# Patient Record
Sex: FEMALE | Race: WHITE | NOT HISPANIC OR LATINO | Employment: FULL TIME | URBAN - METROPOLITAN AREA
[De-identification: names, ages, dates, MRNs, and addresses within clinical notes are randomized per-mention and may not be internally consistent; named-entity substitution may affect disease eponyms.]

---

## 2017-01-17 PROBLEM — R87.810 CERVICAL HIGH RISK HPV (HUMAN PAPILLOMAVIRUS) TEST POSITIVE: Status: ACTIVE | Noted: 2017-01-17

## 2017-01-17 PROBLEM — N92.0 MENORRHAGIA WITH REGULAR CYCLE: Status: RESOLVED | Noted: 2017-01-17 | Resolved: 2017-01-17

## 2017-01-17 PROBLEM — N92.0 MENORRHAGIA WITH REGULAR CYCLE: Status: ACTIVE | Noted: 2017-01-17

## 2018-01-23 DIAGNOSIS — R20.0 LEFT ARM NUMBNESS: Primary | ICD-10-CM

## 2018-03-09 PROBLEM — R06.00 DYSPNEA: Status: ACTIVE | Noted: 2018-03-09

## 2018-06-19 ENCOUNTER — TELEPHONE (OUTPATIENT)
Dept: NEUROSURGERY | Facility: CLINIC | Age: 43
End: 2018-06-19

## 2018-06-19 NOTE — TELEPHONE ENCOUNTER
----- Message from Oralia Armendariz PA-C sent at 6/19/2018  3:24 PM CDT -----  I ordered an MRI for Loren for lumbar back pain. Can you please schedule her at [facility]? I will discuss the results with her and have a formal follow up if needed

## 2018-07-02 DIAGNOSIS — M54.16 LUMBAR RADICULOPATHY: Primary | ICD-10-CM

## 2018-07-05 ENCOUNTER — TELEPHONE (OUTPATIENT)
Dept: NEUROSURGERY | Facility: CLINIC | Age: 43
End: 2018-07-05

## 2018-07-05 NOTE — TELEPHONE ENCOUNTER
----- Message from Oralia Armendariz PA-C sent at 6/29/2018 11:01 AM CDT -----  Regarding: FW: follow-up MRI  Can you please schedule her in 4 months for MRI lumber with and without     ----- Message -----  From: Cleve Ruiz MD  Sent: 6/29/2018   8:35 AM  To: Oralia Armendariz PA-C  Subject: follow-up MRI                                    Let's repeat an MRI with contrast in 4 months time. The lesion is somewhat too small for an accurate biopsy but it is atypical.    She can have PT etc meanwhile. Ask if she has any personal cancer history. I assume not.

## 2018-07-16 ENCOUNTER — OFFICE VISIT (OUTPATIENT)
Dept: CARDIOLOGY | Facility: CLINIC | Age: 43
End: 2018-07-16
Payer: COMMERCIAL

## 2018-07-16 VITALS
DIASTOLIC BLOOD PRESSURE: 77 MMHG | SYSTOLIC BLOOD PRESSURE: 138 MMHG | HEIGHT: 68 IN | WEIGHT: 203.69 LBS | HEART RATE: 92 BPM | BODY MASS INDEX: 30.87 KG/M2

## 2018-07-16 DIAGNOSIS — R00.2 PALPITATIONS: Primary | ICD-10-CM

## 2018-07-16 DIAGNOSIS — F41.1 ANXIETY, GENERALIZED: ICD-10-CM

## 2018-07-16 PROCEDURE — 99999 PR PBB SHADOW E&M-EST. PATIENT-LVL III: CPT | Mod: PBBFAC,,, | Performed by: INTERNAL MEDICINE

## 2018-07-16 PROCEDURE — 99204 OFFICE O/P NEW MOD 45 MIN: CPT | Mod: S$GLB,,, | Performed by: INTERNAL MEDICINE

## 2018-07-16 PROCEDURE — 93000 ELECTROCARDIOGRAM COMPLETE: CPT | Mod: S$GLB,,, | Performed by: INTERNAL MEDICINE

## 2018-07-16 NOTE — PROGRESS NOTES
Subjective:    Patient ID:  Loren Lemus is a 42 y.o. female who presents for evaluation of Consult (Ref by Dr. Pickard; irregular heart beats - chest heaviness ) and Tachycardia      HPI 41 yo female with PVC's, anxiety, migraine headaches.  Reports history of Mitral valve prolapse.  She is an OR nurse.  3/18, she was rounding on the floors.  Noted acute onset of chest pressure (elephant sitting on my chest), pleuritic shortness of breath associated with diaphoresis.  Has had prior panic attacks.  This felt different.  Went to ED, work-up was negative.  Was told on her ECG that her voltage would vary periodically (multiple ECG's performed).    In the past, when hooked up to telemetry for other reasons (ie kidney stones), she has been observed to have PVC's.   Notes occasional skipped beating, couple of times a month, for many years.  Also has had labile HR's, up to 130's.    Has had one episode of recurrent chest pressure 2 weeks ago with walking around the OR.  Indicates she was not under stress at the time.  Resolved after 5 minutes spontaneously.    Has had syncope in the remote past when going from sitting to standing.  Echo 3/10/18 EF 55-60, trivial MR, EDELMIRA 18.51  Spect stress 3/10/18 negative.  ECG reveals nsr with normal baseline intervals without ectopy.  Does not exercise.  She does not exercise because of her HR elevating.  Poor sleep hygiene.    Review of Systems   Constitution: Negative. Negative for weakness and malaise/fatigue.   Cardiovascular: Positive for palpitations. Negative for chest pain, dyspnea on exertion, irregular heartbeat, leg swelling, near-syncope, orthopnea, paroxysmal nocturnal dyspnea and syncope.   Respiratory: Negative for cough and shortness of breath.    Neurological: Negative for dizziness and light-headedness.   All other systems reviewed and are negative.       Objective:    Physical Exam   Constitutional: She is oriented to person, place, and time. She appears  well-developed and well-nourished.   Eyes: Conjunctivae are normal. No scleral icterus.   Neck: No JVD present. No tracheal deviation present.   Cardiovascular: Normal rate and regular rhythm.  PMI is not displaced.    Pulmonary/Chest: Effort normal and breath sounds normal. No respiratory distress.   Abdominal: Soft. There is no hepatosplenomegaly. There is no tenderness.   Musculoskeletal: She exhibits no edema or tenderness.   Neurological: She is alert and oriented to person, place, and time.   Skin: Skin is warm and dry. No rash noted.   Psychiatric: She has a normal mood and affect. Her behavior is normal.         Assessment:       1. Palpitations    2. Anxiety, generalized         Plan:           PVC's are not particularly troublesome for her.  We discussed that criteria for MVP have changed, and that by her more recent echo she has no evidence of this.  She has inappropriate sinus tachycardia.  Discussed lifestyle modification for this (exercise, improving sleep hygiene, and increasing hydration).  Can f/u PRN with EP.

## 2018-07-19 DIAGNOSIS — R00.2 PALPITATIONS: Primary | ICD-10-CM

## 2018-07-20 DIAGNOSIS — R00.2 PALPITATIONS: Primary | ICD-10-CM

## 2019-09-25 ENCOUNTER — OFFICE VISIT (OUTPATIENT)
Dept: NEUROLOGY | Facility: CLINIC | Age: 44
End: 2019-09-25
Payer: COMMERCIAL

## 2019-09-25 VITALS
WEIGHT: 200.94 LBS | RESPIRATION RATE: 17 BRPM | SYSTOLIC BLOOD PRESSURE: 138 MMHG | BODY MASS INDEX: 30.45 KG/M2 | DIASTOLIC BLOOD PRESSURE: 88 MMHG | HEIGHT: 68 IN | HEART RATE: 88 BPM

## 2019-09-25 DIAGNOSIS — G43.711 INTRACTABLE CHRONIC MIGRAINE WITHOUT AURA AND WITH STATUS MIGRAINOSUS: Primary | ICD-10-CM

## 2019-09-25 DIAGNOSIS — G43.109 MIGRAINE WITH AURA AND WITHOUT STATUS MIGRAINOSUS, NOT INTRACTABLE: ICD-10-CM

## 2019-09-25 PROCEDURE — 99999 PR PBB SHADOW E&M-EST. PATIENT-LVL III: CPT | Mod: PBBFAC,,, | Performed by: PSYCHIATRY & NEUROLOGY

## 2019-09-25 PROCEDURE — 99999 PR PBB SHADOW E&M-EST. PATIENT-LVL III: ICD-10-PCS | Mod: PBBFAC,,, | Performed by: PSYCHIATRY & NEUROLOGY

## 2019-09-25 PROCEDURE — 99204 PR OFFICE/OUTPT VISIT, NEW, LEVL IV, 45-59 MIN: ICD-10-PCS | Mod: S$GLB,,, | Performed by: PSYCHIATRY & NEUROLOGY

## 2019-09-25 PROCEDURE — 99204 OFFICE O/P NEW MOD 45 MIN: CPT | Mod: S$GLB,,, | Performed by: PSYCHIATRY & NEUROLOGY

## 2019-09-25 RX ORDER — NAPROXEN 500 MG/1
TABLET ORAL
COMMUNITY
End: 2020-06-01 | Stop reason: ALTCHOICE

## 2019-09-25 RX ORDER — TOPIRAMATE 50 MG/1
50 TABLET, FILM COATED ORAL 2 TIMES DAILY
Refills: 5 | COMMUNITY
Start: 2019-09-17 | End: 2021-11-10

## 2019-09-25 RX ORDER — ONDANSETRON 4 MG/1
4 TABLET, FILM COATED ORAL EVERY 6 HOURS PRN
Qty: 30 TABLET | Refills: 11 | Status: SHIPPED | OUTPATIENT
Start: 2019-09-25 | End: 2021-03-22 | Stop reason: SDUPTHER

## 2019-09-25 RX ORDER — DULOXETIN HYDROCHLORIDE 30 MG/1
30 CAPSULE, DELAYED RELEASE ORAL 2 TIMES DAILY
Refills: 5 | COMMUNITY
Start: 2019-09-17 | End: 2021-06-02

## 2019-09-25 RX ORDER — ZOLMITRIPTAN 5 MG/1
SPRAY, METERED NASAL
Qty: 6 EACH | Refills: 11 | Status: SHIPPED | OUTPATIENT
Start: 2019-09-25 | End: 2020-09-25 | Stop reason: SDUPTHER

## 2019-09-25 RX ORDER — NAPROXEN SODIUM 550 MG/1
550 TABLET ORAL 2 TIMES DAILY
Refills: 2 | COMMUNITY
Start: 2019-07-18 | End: 2020-06-01 | Stop reason: ALTCHOICE
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
## 2019-09-25 NOTE — PATIENT INSTRUCTIONS
TESTING:  -- none     REFERRALS:  -- none     PREVENTION (use daily regardless of headache):  -- seek authorization to begin Botox   -- start magnesium in ONE of the following preparations -    1. Magnesium oxide 800mg daily (the most common over the counter kind, may causes loose stools)   2. Magnesium citrate 400-500mg daily (harder to find, but more neutral on the bowels)   3. Magnesium glycinate 400mg daily (hardest to find, look online, but most bowel-neutral, best absorbed)     AS-NEEDED TREATMENT (use total no more than 10 days per month unless otherwise stated):  -- can try Zomig nasal spray - Start Zomig (zolmitriptan) at onset of migraine. May repeat once in 2 hours. No more than 2 doses per day, 10 days per month  -- otherwise continue naproxen for now  -- refill zofran

## 2019-10-03 ENCOUNTER — TELEPHONE (OUTPATIENT)
Dept: NEUROLOGY | Facility: CLINIC | Age: 44
End: 2019-10-03

## 2019-10-04 ENCOUNTER — PROCEDURE VISIT (OUTPATIENT)
Dept: NEUROLOGY | Facility: CLINIC | Age: 44
End: 2019-10-04
Payer: COMMERCIAL

## 2019-10-04 VITALS
WEIGHT: 202.19 LBS | HEIGHT: 68 IN | BODY MASS INDEX: 30.64 KG/M2 | SYSTOLIC BLOOD PRESSURE: 127 MMHG | DIASTOLIC BLOOD PRESSURE: 82 MMHG | RESPIRATION RATE: 17 BRPM | HEART RATE: 89 BPM

## 2019-10-04 DIAGNOSIS — G43.711 INTRACTABLE CHRONIC MIGRAINE WITHOUT AURA AND WITH STATUS MIGRAINOSUS: Primary | ICD-10-CM

## 2019-10-04 PROCEDURE — 64615 PR CHEMODENERVATION OF MUSCLE FOR CHRONIC MIGRAINE: ICD-10-PCS | Mod: S$GLB,,, | Performed by: PSYCHIATRY & NEUROLOGY

## 2019-10-04 PROCEDURE — 96372 PR INJECTION,THERAP/PROPH/DIAG2ST, IM OR SUBCUT: ICD-10-PCS | Mod: 59,S$GLB,, | Performed by: PSYCHIATRY & NEUROLOGY

## 2019-10-04 PROCEDURE — 96372 THER/PROPH/DIAG INJ SC/IM: CPT | Mod: 59,S$GLB,, | Performed by: PSYCHIATRY & NEUROLOGY

## 2019-10-04 PROCEDURE — 64615 CHEMODENERV MUSC MIGRAINE: CPT | Mod: S$GLB,,, | Performed by: PSYCHIATRY & NEUROLOGY

## 2019-10-04 RX ORDER — KETOROLAC TROMETHAMINE 30 MG/ML
60 INJECTION, SOLUTION INTRAMUSCULAR; INTRAVENOUS
Status: COMPLETED | OUTPATIENT
Start: 2019-10-04 | End: 2019-10-04

## 2019-10-04 RX ADMIN — KETOROLAC TROMETHAMINE 60 MG: 30 INJECTION, SOLUTION INTRAMUSCULAR; INTRAVENOUS at 01:10

## 2019-10-04 NOTE — PROCEDURES
Procedures     PROCEDURE PERFORMED: Botulinum toxin injection (14992)    CLINICAL INDICATION: G43.719    A time out was conducted just before the start of the procedure to verify the correct patient and procedure, procedure location, and all relevant critical information.     Conventional methods of treatment such as multiple medications, both on and   off label have been tried including: anti-epileptics (topiramate, gabapentin) and antidepressants (duloxetine    The patient has been unresponsive and refractory.The patient meets criteria for chronic headaches according to the ICHD-II, the patient has more than 15 headaches a month which last for more than 4 hours a day.    Botox session number: 1  Last session was 12 weeks ago and resulted in improvement of: NA    I am aiming for at least 50%  improvement in the patient's symptoms. Frequency of treatment is every 3 months unless no response to the treatments, at which time we will discontinue the injections.     DESCRIPTION OF PROCEDURE: After obtaining informed consent and under   aseptic technique, a total of 135 units of botulinum toxin type A were   injected in the following muscles:     -- Procerus 5 units  --  5 units bilaterally  -- Temporalis 20 units bilaterally  -- Occipitalis 15 units bilaterally  -- Upper cervical paraspinals 10 units bilaterally  -- Trapezius 15 units bilaterally.     Spared due to anatomy -   -- Frontalis 20 units    The patient tolerated the procedure well. There were no complications. The patient was given a prescription for repeat treatment in 12 weeks     Unavoidable waste 65 units    Toshia Garcia MD

## 2019-10-07 ENCOUNTER — TELEPHONE (OUTPATIENT)
Dept: NEUROLOGY | Facility: CLINIC | Age: 44
End: 2019-10-07

## 2019-10-07 NOTE — TELEPHONE ENCOUNTER
Received additional questions in regards from [employer]. Questions completed and faxed back to [phone].

## 2019-12-18 ENCOUNTER — TELEPHONE (OUTPATIENT)
Dept: NEUROLOGY | Facility: CLINIC | Age: 44
End: 2019-12-18

## 2019-12-18 DIAGNOSIS — G43.711 INTRACTABLE CHRONIC MIGRAINE WITHOUT AURA AND WITH STATUS MIGRAINOSUS: Primary | ICD-10-CM

## 2019-12-20 ENCOUNTER — PROCEDURE VISIT (OUTPATIENT)
Dept: NEUROLOGY | Facility: CLINIC | Age: 44
End: 2019-12-20
Payer: COMMERCIAL

## 2019-12-20 VITALS
HEIGHT: 69 IN | SYSTOLIC BLOOD PRESSURE: 126 MMHG | RESPIRATION RATE: 18 BRPM | DIASTOLIC BLOOD PRESSURE: 77 MMHG | BODY MASS INDEX: 31.19 KG/M2 | WEIGHT: 210.56 LBS | HEART RATE: 78 BPM

## 2019-12-20 DIAGNOSIS — G43.711 INTRACTABLE CHRONIC MIGRAINE WITHOUT AURA AND WITH STATUS MIGRAINOSUS: Primary | ICD-10-CM

## 2019-12-20 PROCEDURE — 64615 CHEMODENERV MUSC MIGRAINE: CPT | Mod: S$GLB,,, | Performed by: PSYCHIATRY & NEUROLOGY

## 2019-12-20 PROCEDURE — 64615 PR CHEMODENERVATION OF MUSCLE FOR CHRONIC MIGRAINE: ICD-10-PCS | Mod: S$GLB,,, | Performed by: PSYCHIATRY & NEUROLOGY

## 2019-12-20 NOTE — PROCEDURES
Procedures       PROCEDURE PERFORMED: Botulinum toxin injection (92538)    CLINICAL INDICATION: G43.719    A time out was conducted just before the start of the procedure to verify the correct patient and procedure, procedure location, and all relevant critical information.     Conventional methods of treatment such as multiple medications, both on and   off label have been tried including: anti-epileptics (topiramate, gabapentin) and antidepressants (duloxetine    The patient has been unresponsive and refractory.The patient meets criteria for chronic headaches according to the ICHD-II, the patient has more than 15 headaches a month which last for more than 4 hours a day.    Botox session number: 2  Last session was 12 weeks ago and resulted in improvement of: >50%     I am aiming for at least 50%  improvement in the patient's symptoms. Frequency of treatment is every 3 months unless no response to the treatments, at which time we will discontinue the injections.     DESCRIPTION OF PROCEDURE: After obtaining informed consent and under   aseptic technique, a total of 135 units of botulinum toxin type A were   injected in the following muscles:     -- Procerus 5 units  --  5 units bilaterally  -- Temporalis 20 units bilaterally  -- Occipitalis 15 units bilaterally  -- Upper cervical paraspinals 10 units bilaterally  -- Trapezius 15 units bilaterally.     Spared due to anatomy -   -- Frontalis 20 units    The patient tolerated the procedure well. There were no complications. The patient was given a prescription for repeat treatment in 12 weeks     Unavoidable waste 65 units    Toshia Garcia MD

## 2020-01-29 ENCOUNTER — TELEPHONE (OUTPATIENT)
Dept: PHARMACY | Facility: CLINIC | Age: 45
End: 2020-01-29

## 2020-01-29 NOTE — TELEPHONE ENCOUNTER
Good Afternoon,     The prior authorization for Loren Lemus's Zomig Nasal prescription has been APPROVED FROM 1/29/2020 TO 1/28/2021 with copayment of $25.00.       We were unable to reach patient on 1/29/2020.  A voicemail was left for the patient of the prior authorization status along with an appropriate pharmacy phone number should he/she have questions.    If there are any additional questions or concerns, please contact me.    Thank You!   Loren Lechuga CPhT, B.A  Patient Care Advocate   [facility]  Phone: [phone] Ext [phone]  Fax: [phone]

## 2020-03-16 ENCOUNTER — TELEPHONE (OUTPATIENT)
Dept: NEUROLOGY | Facility: CLINIC | Age: 45
End: 2020-03-16

## 2020-03-16 DIAGNOSIS — G43.711 INTRACTABLE CHRONIC MIGRAINE WITHOUT AURA AND WITH STATUS MIGRAINOSUS: Primary | ICD-10-CM

## 2020-03-20 ENCOUNTER — PATIENT MESSAGE (OUTPATIENT)
Dept: NEUROLOGY | Facility: CLINIC | Age: 45
End: 2020-03-20

## 2020-03-20 ENCOUNTER — TELEPHONE (OUTPATIENT)
Dept: NEUROLOGY | Facility: CLINIC | Age: 45
End: 2020-03-20

## 2020-03-20 NOTE — TELEPHONE ENCOUNTER
Left voicemail in regards to botox denied by insurance. Number to Financial call team given to help with payments. Botox savings web site given. Informed appt will need to be cancelled and to call back.

## 2020-03-23 ENCOUNTER — TELEPHONE (OUTPATIENT)
Dept: NEUROLOGY | Facility: CLINIC | Age: 45
End: 2020-03-23

## 2020-03-23 DIAGNOSIS — G43.711 INTRACTABLE CHRONIC MIGRAINE WITHOUT AURA AND WITH STATUS MIGRAINOSUS: Primary | ICD-10-CM

## 2020-03-24 ENCOUNTER — TELEPHONE (OUTPATIENT)
Dept: NEUROLOGY | Facility: CLINIC | Age: 45
End: 2020-03-24

## 2020-03-25 ENCOUNTER — TELEPHONE (OUTPATIENT)
Dept: UROGYNECOLOGY | Facility: CLINIC | Age: 45
End: 2020-03-25

## 2020-03-25 NOTE — TELEPHONE ENCOUNTER
----- Message from Jessica Wisdom sent at 3/25/2020  1:29 PM CDT -----  Contact: my chart request  Appointment Request From: Loren Lemus    With Provider: Kayla    Preferred Date Range: Any date 3/25/2020 or later    Preferred Times: Any time    Reason for visit: Vaginally prolapse    Comments:  Arash

## 2020-03-26 ENCOUNTER — PROCEDURE VISIT (OUTPATIENT)
Dept: NEUROLOGY | Facility: CLINIC | Age: 45
End: 2020-03-26
Payer: COMMERCIAL

## 2020-03-26 VITALS
HEIGHT: 69 IN | BODY MASS INDEX: 31.1 KG/M2 | RESPIRATION RATE: 16 BRPM | TEMPERATURE: 98 F | DIASTOLIC BLOOD PRESSURE: 79 MMHG | WEIGHT: 210 LBS | HEART RATE: 101 BPM | SYSTOLIC BLOOD PRESSURE: 120 MMHG

## 2020-03-26 DIAGNOSIS — G43.711 INTRACTABLE CHRONIC MIGRAINE WITHOUT AURA AND WITH STATUS MIGRAINOSUS: Primary | ICD-10-CM

## 2020-03-26 PROCEDURE — 96372 PR INJECTION,THERAP/PROPH/DIAG2ST, IM OR SUBCUT: ICD-10-PCS | Mod: S$GLB,,, | Performed by: PSYCHIATRY & NEUROLOGY

## 2020-03-26 PROCEDURE — 96372 THER/PROPH/DIAG INJ SC/IM: CPT | Mod: S$GLB,,, | Performed by: PSYCHIATRY & NEUROLOGY

## 2020-03-26 PROCEDURE — 64615 CHEMODENERV MUSC MIGRAINE: CPT | Mod: S$GLB,,, | Performed by: PSYCHIATRY & NEUROLOGY

## 2020-03-26 PROCEDURE — 64615 PR CHEMODENERVATION OF MUSCLE FOR CHRONIC MIGRAINE: ICD-10-PCS | Mod: S$GLB,,, | Performed by: PSYCHIATRY & NEUROLOGY

## 2020-03-26 RX ORDER — KETOROLAC TROMETHAMINE 30 MG/ML
60 INJECTION, SOLUTION INTRAMUSCULAR; INTRAVENOUS
Status: COMPLETED | OUTPATIENT
Start: 2020-03-26 | End: 2020-03-26

## 2020-03-26 RX ADMIN — KETOROLAC TROMETHAMINE 60 MG: 30 INJECTION, SOLUTION INTRAMUSCULAR; INTRAVENOUS at 09:03

## 2020-03-26 NOTE — PROCEDURES
Procedures       PROCEDURE PERFORMED: Botulinum toxin injection (99808)    CLINICAL INDICATION: G43.719    A time out was conducted just before the start of the procedure to verify the correct patient and procedure, procedure location, and all relevant critical information.     Conventional methods of treatment such as multiple medications, both on and   off label have been tried including: anti-epileptics (topiramate, gabapentin) and antidepressants (duloxetine    The patient has been unresponsive and refractory.The patient meets criteria for chronic headaches according to the ICHD-II, the patient has more than 15 headaches a month which last for more than 4 hours a day.    Botox session number: 3  Last session was 12 weeks ago and resulted in improvement of: >50%     I am aiming for at least 50%  improvement in the patient's symptoms. Frequency of treatment is every 3 months unless no response to the treatments, at which time we will discontinue the injections.     DESCRIPTION OF PROCEDURE: After obtaining informed consent and under   aseptic technique, a total of 135 units of botulinum toxin type A were   injected in the following muscles:     -- Procerus 5 units  --  5 units bilaterally  -- Temporalis 20 units bilaterally  -- Occipitalis 15 units bilaterally  -- Upper cervical paraspinals 10 units bilaterally  -- Trapezius 15 units bilaterally.     Spared due to anatomy -   -- Frontalis 20 units    The patient tolerated the procedure well. There were no complications. The patient was given a prescription for repeat treatment in 12 weeks     Unavoidable waste 65 units    Toshia Garcia MD

## 2020-03-26 NOTE — NURSING
Patient seen in clinic by Dr Garcia.Order given to administer Toradol 60 mg IM.2 Patient identifiers and allergies reviewed with Patient.Injection given in left outer gluteus.Site clean,no bleeding or redness noted.Sterile band aid applied to site.Before injection Patient rated pain at a 3.Patient waited in exam room with Nursing present for 15 minutes.No untoward reaction noted.Pain scale upon departure is a 2.

## 2020-04-21 DIAGNOSIS — Z01.84 ANTIBODY RESPONSE EXAMINATION: ICD-10-CM

## 2020-04-24 ENCOUNTER — LAB VISIT (OUTPATIENT)
Dept: LAB | Facility: HOSPITAL | Age: 45
End: 2020-04-24
Attending: INTERNAL MEDICINE
Payer: COMMERCIAL

## 2020-04-24 DIAGNOSIS — Z01.84 ANTIBODY RESPONSE EXAMINATION: ICD-10-CM

## 2020-04-24 LAB — SARS-COV-2 IGG SERPL QL IA: NEGATIVE

## 2020-04-24 PROCEDURE — 86769 SARS-COV-2 COVID-19 ANTIBODY: CPT

## 2020-04-24 PROCEDURE — 36415 COLL VENOUS BLD VENIPUNCTURE: CPT

## 2020-04-29 ENCOUNTER — TELEPHONE (OUTPATIENT)
Dept: RESEARCH | Facility: CLINIC | Age: 45
End: 2020-04-29

## 2020-05-01 ENCOUNTER — RESEARCH ENCOUNTER (OUTPATIENT)
Dept: RESEARCH | Facility: CLINIC | Age: 45
End: 2020-05-01
Payer: COMMERCIAL

## 2020-05-01 DIAGNOSIS — Z00.00 PREVENTATIVE HEALTH CARE: Primary | ICD-10-CM

## 2020-05-01 DIAGNOSIS — Z00.6 RESEARCH STUDY PATIENT: ICD-10-CM

## 2020-05-01 PROCEDURE — 99499 UNLISTED E&M SERVICE: CPT | Mod: S$GLB,,, | Performed by: CLINICAL NURSE SPECIALIST

## 2020-05-01 PROCEDURE — 99499 NO LOS: ICD-10-PCS | Mod: S$GLB,,, | Performed by: CLINICAL NURSE SPECIALIST

## 2020-05-01 NOTE — PROGRESS NOTES
Sponsor: [facility]   Study: Healthcare Worker Exposure Response and Outcomes of Hydroxychloroquine Trial (HERO-HCQ Trial)  IRB Number: [id number]  Principle Investigator: Gina Kimball MSN, APRN, [facility]    Site Number: [id number]  Subject Number: [id number]      Baseline Visit (Onsite, Day 0)  Screening of inclusion/exclusion criteria evaluation for this study were reviewed by [employer]. Loren Lemus has executed the informed consent and is eligible to participate in this study. The patient meets all inclusion and does not meet any one of the exclusion criteria, is afebrile and drug allergy reviewed.    The screening procedures completed include the following:   Demographic data (including gender, age, ethnicity, date of birth, and occupation);   Temperature:97.7   Medical history and prohibited medications;   Study questionnaires      The patient is now randomized to treatment:  Date Dispensed: 01MAY2020  Bottle Number: [id number]  Dose (mg) of each table: 200 mg  Tablets in each bottle: 100  Lot Number: [id number]  Expiration Date: 31JAN2022    Patient instructed to take study drug as directed.  600mg po BID on Day #1 then  400mg po QD x29 days     The following biospecimen collection procedures were collected:   Nasopharyngeal Swab Collection   Blood collection     The patient reports no adverse events and events of special interest.

## 2020-05-01 NOTE — PROGRESS NOTES
Sponsor: [facility]   Study: Healthcare Worker Exposure Response and Outcomes of Hydroxychloroquine Trial (HERO-HCQ Trial)  IRB Number: 2020.131  Principle Investigator: Gina Kimball, MSN, APRN, ACNS-BC  Present for Discussion: Loren Lemus (patient), Maureen Knox (CRC)   Site Number: [id number]  Subject Number: [id number]    Prior to the Informed Consent (IC) being signed, or any study protocol required data collection, testing, procedure, or intervention being performed, the following was done and/or discussed:   Patient was given a copy of the IC for review    Purpose of the study and qualifications to participate    Study design, follow up schedule, and tests or procedures done at each visit   Confidentiality and HIPAA Authorization for Release of Medical Records for the research trial/ subject's rights/research related injury   Risk, Benefits, Alternative Treatments, Compensation and Costs   Participation in the research trial is voluntary and patient may withdraw at anytime   Contact information for study related questions    Patient verbalizes understanding of the above: Yes  Contact information for CRC and PI given to patient: Yes  Patient able to adequately summarize: the purpose of the study, the risks associated with the study, and all procedures, testing, and follow-ups associated with the study: Yes    Loren Lemus signed the informed consent form for the HERO-HCQ research study with an IRB approval date of 04/20/2020.  Each page of the consent form was reviewed with her and all questions answered satisfactorily. She then signed the paper consent form, which was countersigned by the CRC on this day. A copy of the fully executed IC was provided to the subject. The original consent was scanned and uploaded to the [facility] EMR (maufait) and filed into the subject's research study binder.     Following the execution of the informed consent, the patient was identified as subject [id number], was  added to [employer], and the study sponsor's EDC system. The visit was linked to the study accordingly.

## 2020-06-01 ENCOUNTER — OFFICE VISIT (OUTPATIENT)
Dept: ORTHOPEDICS | Facility: CLINIC | Age: 45
End: 2020-06-01
Payer: COMMERCIAL

## 2020-06-01 ENCOUNTER — HOSPITAL ENCOUNTER (OUTPATIENT)
Dept: RADIOLOGY | Facility: HOSPITAL | Age: 45
Discharge: HOME | End: 2020-06-01
Attending: ORTHOPAEDIC SURGERY
Payer: COMMERCIAL

## 2020-06-01 ENCOUNTER — OFFICE VISIT (OUTPATIENT)
Dept: INFECTIOUS DISEASES | Facility: CLINIC | Age: 45
End: 2020-06-01
Payer: COMMERCIAL

## 2020-06-01 VITALS — BODY MASS INDEX: 31.1 KG/M2 | WEIGHT: 210 LBS | RESPIRATION RATE: 16 BRPM | HEIGHT: 69 IN

## 2020-06-01 DIAGNOSIS — M25.532 LEFT WRIST PAIN: ICD-10-CM

## 2020-06-01 DIAGNOSIS — Z00.6 RESEARCH STUDY PATIENT: ICD-10-CM

## 2020-06-01 DIAGNOSIS — Z00.00 PREVENTATIVE HEALTH CARE: Primary | ICD-10-CM

## 2020-06-01 DIAGNOSIS — S60.212A CONTUSION OF LEFT WRIST, INITIAL ENCOUNTER: Primary | ICD-10-CM

## 2020-06-01 DIAGNOSIS — M25.532 LEFT WRIST PAIN: Primary | ICD-10-CM

## 2020-06-01 PROCEDURE — 3008F PR BODY MASS INDEX (BMI) DOCUMENTED: ICD-10-PCS | Mod: CPTII,S$GLB,, | Performed by: ORTHOPAEDIC SURGERY

## 2020-06-01 PROCEDURE — 99499 UNLISTED E&M SERVICE: CPT | Mod: S$GLB,,, | Performed by: CLINICAL NURSE SPECIALIST

## 2020-06-01 PROCEDURE — 99204 OFFICE O/P NEW MOD 45 MIN: CPT | Mod: S$GLB,,, | Performed by: ORTHOPAEDIC SURGERY

## 2020-06-01 PROCEDURE — 73110 X-RAY EXAM OF WRIST: CPT | Mod: TC,PN,LT

## 2020-06-01 PROCEDURE — 99999 PR PBB SHADOW E&M-EST. PATIENT-LVL III: ICD-10-PCS | Mod: PBBFAC,,, | Performed by: ORTHOPAEDIC SURGERY

## 2020-06-01 PROCEDURE — 99499 NO LOS: ICD-10-PCS | Mod: S$GLB,,, | Performed by: CLINICAL NURSE SPECIALIST

## 2020-06-01 PROCEDURE — 99999 PR PBB SHADOW E&M-EST. PATIENT-LVL III: CPT | Mod: PBBFAC,,, | Performed by: ORTHOPAEDIC SURGERY

## 2020-06-01 PROCEDURE — 99204 PR OFFICE/OUTPT VISIT, NEW, LEVL IV, 45-59 MIN: ICD-10-PCS | Mod: S$GLB,,, | Performed by: ORTHOPAEDIC SURGERY

## 2020-06-01 PROCEDURE — 73110 XR WRIST COMPLETE 3 VIEWS LEFT: ICD-10-PCS | Mod: 26,LT,, | Performed by: RADIOLOGY

## 2020-06-01 PROCEDURE — 73110 X-RAY EXAM OF WRIST: CPT | Mod: 26,LT,, | Performed by: RADIOLOGY

## 2020-06-01 PROCEDURE — 3008F BODY MASS INDEX DOCD: CPT | Mod: CPTII,S$GLB,, | Performed by: ORTHOPAEDIC SURGERY

## 2020-06-01 PROCEDURE — 99999 PR PBB SHADOW E&M-EST. PATIENT-LVL III: CPT | Mod: PBBFAC,,, | Performed by: CLINICAL NURSE SPECIALIST

## 2020-06-01 PROCEDURE — 99999 PR PBB SHADOW E&M-EST. PATIENT-LVL III: ICD-10-PCS | Mod: PBBFAC,,, | Performed by: CLINICAL NURSE SPECIALIST

## 2020-06-01 RX ORDER — KETOROLAC TROMETHAMINE 10 MG/1
10 TABLET, FILM COATED ORAL EVERY 6 HOURS
Qty: 20 TABLET | Refills: 0 | Status: SHIPPED | OUTPATIENT
Start: 2020-06-01 | End: 2020-11-27 | Stop reason: SDUPTHER

## 2020-06-01 RX ORDER — METHYLPREDNISOLONE 4 MG/1
TABLET ORAL
Qty: 1 PACKAGE | Refills: 0 | Status: SHIPPED | OUTPATIENT
Start: 2020-06-01 | End: 2020-06-22

## 2020-06-01 NOTE — PROGRESS NOTES
Sponsor: [facility]   Study: Healthcare Worker Exposure Response and Outcomes of Hydroxychloroquine Trial (HERO-HCQ Trial)  IRB Number: 2020.131  Principle Investigator: Gina Kimball, MSN, APRN, ACNS-BC  Present for Discussion: subject and Gina Kimball  Site Number: [id number]  Subject Number: [id number]        Follow-up Day 30 (+ 5 days - Onsite)  Loren Lemus has executed the informed consent and is eligible to participate in this study. The participant completed 30 days of study drug and is present for Follow up Day 30 visit.     The following procedures were completed:   Concomitant medications of interest;   COVID-19 Questionnaire    QoL questionnaire     The following biospecimen collection procedures were collected:   Nasopharyngeal Swab Collection   Blood collection     The patient reports no reportable adverse events.

## 2020-06-01 NOTE — PATIENT INSTRUCTIONS
Ketorolac tablets  What is this medicine?  KETOROLAC (natasha toe ROLE ak) is a non-steroidal anti-inflammatory drug (NSAID). It is used for a short while to treat moderate to severe pain, including pain after surgery. It should not be used for more than 5 days.  How should I use this medicine?  Take this medicine by mouth with a full glass of water. Follow the directions on the prescription label. Take your medicine at regular intervals. Do not take your medicine more often than directed. Do not take more than the recommended dose.  A special MedGuide will be given to you by the pharmacist with each prescription and refill. Be sure to read this information carefully each time.  Talk to your pediatrician regarding the use of this medicine in children. While this drug may be prescribed for children as young as 16 years of age for selected conditions, precautions do apply.  Patients over 65 years old may have a stronger reaction and need a smaller dose.  What side effects may I notice from receiving this medicine?  Side effects that you should report to your doctor or health care professional as soon as possible:  · allergic reactions like skin rash, itching or hives, swelling of the face, lips, or tongue  · breathing problems  · high blood pressure  · nausea, vomiting  · redness, blistering, peeling or loosening of the skin, including inside the mouth  · severe stomach pain  · signs and symptoms of bleeding such as bloody or black, tarry stools; red or dark-brown urine; spitting up blood or brown material that looks like coffee grounds; red spots on the skin; unusual bruising or bleeding from the eye, gums, or nose  · signs and symptoms of a stroke like changes in vision; confusion; trouble speaking or understanding; severe headaches; sudden numbness or weakness of the face, arm or leg; trouble walking; dizziness; loss of balance or coordination  · trouble passing urine or change in the amount of urine  · unexplained  weight gain or swelling  · unusually weak or tired  · yellowing of eyes or skin  Side effects that usually do not require medical attention (report to your doctor or health care professional if they continue or are bothersome):  · diarrhea  · dizziness  · headache  · heartburn  What may interact with this medicine?  Do not take this medicine with any of the following medications:  · aspirin and aspirin-like medicines  · cidofovir  · methotrexate  · NSAIDs, medicines for pain and inflammation, like ibuprofen or naproxen  · pemetrexed  · probenecid  This medicine may also interact with the following medications:  · alcohol  · alendronate  · alprazolam  · carbamazepine  · cyclosporine  · diuretics  · flavocoxid  · fluoxetine  · ginkgo  · lithium  · medicines for high blood pressure like enalapril  · medicines that affect platelets like pentoxifylline  · medicines that treat or prevent blood clots like heparin, warfarin  · muscle relaxants  · phenytoin  · steroid medicines like prednisone or cortisone  · thiothixene  What if I miss a dose?  If you miss a dose, take it as soon as you can. If it is almost time for your next dose, take only that dose. Do not take double or extra doses.  Where should I keep my medicine?  Keep out of the reach of children.  Store at room temperature between 20 and 25 degrees C (68 and 77 degrees F). Throw away any unused medicine after the expiration date.  What should I tell my health care provider before I take this medicine?  They need to know if you have any of these conditions:  · asthma  · bleeding problems like hemophilia  · cigarette smoker  · drink more than 3 alcohol containing drinks a day  · heart disease or circulation problems such as heart failure or leg edema (fluid retention)  · high blood pressure  · kidney disease  · liver disease  · stomach bleeding or ulcers  · an unusual or allergic reaction to ketorolac, aspirin, other NSAIDs, other medicines, foods, dyes, or  preservatives  · pregnant or trying to get pregnant  · breast-feeding  What should I watch for while using this medicine?  Tell your doctor or health care professional if your pain does not get better. Talk to your doctor before taking another medicine for pain. Do not treat yourself.  This medicine does not prevent heart attack or stroke. In fact, this medicine may increase the chance of a heart attack or stroke. The chance may increase with longer use of this medicine and in people who have heart disease. If you take aspirin to prevent heart attack or stroke, talk with your doctor or health care professional.  Do not take medicines such as ibuprofen and naproxen with this medicine. Side effects such as stomach upset, nausea, or ulcers may be more likely to occur. Many medicines available without a prescription should not be taken with this medicine.  This medicine can cause ulcers and bleeding in the stomach and intestines at any time during treatment. Do not smoke cigarettes or drink alcohol. These increase irritation to your stomach and can make it more susceptible to damage from this medicine. Ulcers and bleeding can happen without warning symptoms and can cause death.  You may get drowsy or dizzy. Do not drive, use machinery, or do anything that needs mental alertness until you know how this medicine affects you. Do not stand or sit up quickly, especially if you are an older patient. This reduces the risk of dizzy or fainting spells.  This medicine can cause you to bleed more easily. Try to avoid damage to your teeth and gums when you brush or floss your teeth.  NOTE:This sheet is a summary. It may not cover all possible information. If you have questions about this medicine, talk to your doctor, pharmacist, or health care provider. Copyright© 2017 [employer]        Methylprednisolone tablets  What is this medicine?  METHYLPREDNISOLONE (meth ill pred NISS oh lone) is a corticosteroid. It is commonly used to  treat inflammation of the skin, joints, lungs, and other organs. Common conditions treated include asthma, allergies, and arthritis. It is also used for other conditions, such as blood disorders and diseases of the adrenal glands.  How should I use this medicine?  Take this medicine by mouth with a drink of water. Follow the directions on the prescription label. Take it with food or milk to avoid stomach upset. If you are taking this medicine once a day, take it in the morning. Do not take more medicine than you are told to take. Do not suddenly stop taking your medicine because you may develop a severe reaction. Your doctor will tell you how much medicine to take. If your doctor wants you to stop the medicine, the dose may be slowly lowered over time to avoid any side effects.  Talk to your pediatrician regarding the use of this medicine in children. Special care may be needed.  What side effects may I notice from receiving this medicine?  Side effects that you should report to your doctor or health care professional as soon as possible:  · allergic reactions like skin rash, itching or hives, swelling of the face, lips, or tongue  · eye pain, decreased or blurred vision, or bulging eyes  · fever, sore throat, sneezing, cough, or other signs of infection, wounds that will not heal  · increased thirst  · mental depression, mood swings, mistaken feelings of self importance or of being mistreated  · pain in hips, back, ribs, arms, shoulders, or legs  · swelling of the ankles, feet, hands  · trouble passing urine or change in the amount of urine  Side effects that usually do not require medical attention (report to your doctor or health care professional if they continue or are bothersome):  · confusion, excitement, restlessness  · headache  · nausea, vomiting  · skin problems, acne, thin and shiny skin  · weight gain  What may interact with this medicine?  Do not take this medicine with any of the following  medications:  · mifepristone  This medicine may also interact with the following medications:  · tacrolimus  · vaccines  · warfarin  What if I miss a dose?  If you miss a dose, take it as soon as you can. If it is almost time for your next dose, talk to your doctor or health care professional. You may need to miss a dose or take an extra dose. Do not take double or extra doses without advice.  Where should I keep my medicine?  Keep out of the reach of children.  Store at room temperature between 20 and 25 degrees C (68 and 77 degrees F). Throw away any unused medicine after the expiration date.  What should I tell my health care provider before I take this medicine?  They need to know if you have any of these conditions:  · Cushing's syndrome  · diabetes  · glaucoma  · heart problems or disease  · high blood pressure  · infection such as herpes, measles, tuberculosis, or chickenpox  · kidney disease  · liver disease  · mental problems  · myasthenia gravis  · osteoporosis  · seizures  · stomach ulcer or intestine disease including colitis and diverticulitis  · thyroid problem  · an unusual or allergic reaction to lactose, methylprednisolone, other medicines, foods, dyes, or preservatives  · pregnant or trying to get pregnant  · breast-feeding  What should I watch for while using this medicine?  Visit your doctor or health care professional for regular checks on your progress. If you are taking this medicine for a long time, carry an identification card with your name and address, the type and dose of your medicine, and your doctor's name and address.  The medicine may increase your risk of getting an infection. Stay away from people who are sick. Tell your doctor or health care professional if you are around anyone with measles or chickenpox.  If you are going to have surgery, tell your doctor or health care professional that you have taken this medicine within the last twelve months.  Ask your doctor or health care  professional about your diet. You may need to lower the amount of salt you eat.  The medicine can increase your blood sugar. If you are a diabetic check with your doctor if you need help adjusting the dose of your diabetic medicine.  NOTE:This sheet is a summary. It may not cover all possible information. If you have questions about this medicine, talk to your doctor, pharmacist, or health care provider. Copyright© 2017 [employer] Standard

## 2020-06-01 NOTE — PROGRESS NOTES
CC:  A 4-year-old female presents for evaluation of left wrist pain the patient is going to walker dog on 05/30/2020, 2 days ago.  She states when they got to the door dog bolted and ended up slamming her dorsal hand and wrist into the door frame.  She complains of pain in the left wrist since that time.  She rates the pain as a 5/10.  She is having difficulty gripping and grasping with the left hand since the injury.  The pain localizes over the dorsum of her left wrist.  She has been taking Naprosyn without complete relief.    ROS:    Constitution: Denies chills, fever, and sweats.  HENT: Denies headaches or blurry vision.  Cardiovascular: Denies chest pain or irregular heart beat.  Respiratory: Denies cough or shortness of breath.  Gastrointestinal: Denies abdominal pain, nausea, or vomiting.  Genitourinary:  Denies urinary incontinence, bladder and kidney issues  Musculoskeletal:  Denies muscle cramps.  Positive for left wrist pain  Neurological: Denies dizziness or focal weakness.  Psychiatric/Behavioral: Normal mental status.  Hematologic/Lymphatic: Denies bleeding problem or easy bruising/bleeding.  Skin: Denies rash or suspicious lesions.    Physical examination     Gen - No acute distress, well nourished, well groomed   Eyes - Extraoccular motions intact, pupils equally round and reactive to light and accommodation   ENT - normocephalic, atruamtic, oropharynx clear   Neck - Supple, no abnormal masses   Cardiovascular - regular rate and rhythm   Pulmonary - clear to auscultation bilaterally, no wheezes, ronchi, or rales   Abdomen - soft, non-tender, non-distended, positive bowel sounds   Psych - The patient is alert and oriented x3 with normal mood and affect    Left Upper Extremity Examination     Skin shows an abrasion on the dorsal hand over the ring finger metacarpal that is healing.  There is ecchymosis over the ring finger metacarpal base down into the area of the wrist dorsally.  Motor is intact  distally radial, median, ulnar, AIN, PIN   +2 radial and ulnar pulses   Sensation to light touch is intact distally radial, median, and ulnar   Full ROM at the DIP, PIP, and MCP joints   Wrist shows decreased ROM secondary to pain  Tenderness to palpation noted over the dorsal radiocarpal joint and DRUJ    Carpal Tunnel compression test - negative   Phalen's Test - negative  Tinel's Test - negative  Finkelstien's Test - negative    Ecchymosis noted   Swelling noted     Triggering of fingers or thumb - negative    X-ray images were examined and personally interpreted by me.  Three views left wrist dated 06/01/2020 show joint spaces are all well maintained.  There are no acute fractures.  No advanced arthritic changes.  Normal bony anatomy    Dx:  Contusion to the dorsal left wrist    Plan:  Recommendation is for immobilization in a splint along with a Medrol Dosepak and Toradol.  The patient is in agreement.  We dispensed the splint and she is going to  the medications at her pharmacy.  This should resolve over the next few weeks and she can slowly wean herself out of the splint.  If she continues to have pain she can follow up in the next couple of weeks.

## 2020-06-04 ENCOUNTER — TELEPHONE (OUTPATIENT)
Dept: ORTHOPEDICS | Facility: CLINIC | Age: 45
End: 2020-06-04

## 2020-06-04 NOTE — TELEPHONE ENCOUNTER
DOI 5/30/20 - patient's left hand slammed against door frame.  Pt went to Dr. Thompson on 6/1, pt diagnosed with bone contusion.  Pt states her hand and fingers are still swollen  With throbbing pain and unable to bend her wrist.  Pt reports taking naproxen not helping with pain.  Pt requesting appointment with Dr. Murray.  Informed pt Dr. Murray does not have any availability on his schedule for the next couple weeks.  Informed pt that I will call her back after speaking to provider on the timing for her requested appointment.

## 2020-06-08 ENCOUNTER — HOSPITAL ENCOUNTER (OUTPATIENT)
Dept: RADIOLOGY | Facility: HOSPITAL | Age: 45
Discharge: HOME | End: 2020-06-08
Attending: ORTHOPAEDIC SURGERY
Payer: COMMERCIAL

## 2020-06-08 ENCOUNTER — OFFICE VISIT (OUTPATIENT)
Dept: ORTHOPEDICS | Facility: CLINIC | Age: 45
End: 2020-06-08
Payer: COMMERCIAL

## 2020-06-08 ENCOUNTER — TELEPHONE (OUTPATIENT)
Dept: NEUROLOGY | Facility: CLINIC | Age: 45
End: 2020-06-08

## 2020-06-08 VITALS
BODY MASS INDEX: 31.1 KG/M2 | WEIGHT: 210 LBS | DIASTOLIC BLOOD PRESSURE: 85 MMHG | HEIGHT: 69 IN | HEART RATE: 90 BPM | SYSTOLIC BLOOD PRESSURE: 142 MMHG

## 2020-06-08 DIAGNOSIS — M25.532 LEFT WRIST PAIN: ICD-10-CM

## 2020-06-08 DIAGNOSIS — M25.532 LEFT WRIST PAIN: Primary | ICD-10-CM

## 2020-06-08 DIAGNOSIS — S60.212A CONTUSION OF LEFT WRIST, INITIAL ENCOUNTER: ICD-10-CM

## 2020-06-08 PROCEDURE — 99999 PR PBB SHADOW E&M-EST. PATIENT-LVL III: CPT | Mod: PBBFAC,,, | Performed by: ORTHOPAEDIC SURGERY

## 2020-06-08 PROCEDURE — 99203 PR OFFICE/OUTPT VISIT, NEW, LEVL III, 30-44 MIN: ICD-10-PCS | Mod: S$GLB,,, | Performed by: ORTHOPAEDIC SURGERY

## 2020-06-08 PROCEDURE — 3008F BODY MASS INDEX DOCD: CPT | Mod: CPTII,S$GLB,, | Performed by: ORTHOPAEDIC SURGERY

## 2020-06-08 PROCEDURE — 73110 X-RAY EXAM OF WRIST: CPT | Mod: TC,PO,LT

## 2020-06-08 PROCEDURE — 73110 X-RAY EXAM OF WRIST: CPT | Mod: 26,LT,, | Performed by: RADIOLOGY

## 2020-06-08 PROCEDURE — 3008F PR BODY MASS INDEX (BMI) DOCUMENTED: ICD-10-PCS | Mod: CPTII,S$GLB,, | Performed by: ORTHOPAEDIC SURGERY

## 2020-06-08 PROCEDURE — 99203 OFFICE O/P NEW LOW 30 MIN: CPT | Mod: S$GLB,,, | Performed by: ORTHOPAEDIC SURGERY

## 2020-06-08 PROCEDURE — 99999 PR PBB SHADOW E&M-EST. PATIENT-LVL III: ICD-10-PCS | Mod: PBBFAC,,, | Performed by: ORTHOPAEDIC SURGERY

## 2020-06-08 PROCEDURE — 73110 XR WRIST COMPLETE 3 VIEWS LEFT: ICD-10-PCS | Mod: 26,LT,, | Performed by: RADIOLOGY

## 2020-06-08 NOTE — TELEPHONE ENCOUNTER
Called patient. Offered botox with NP, Silvina Grier but patient was fine with waiting till Dr. Garcia got back. Rescheduled appointment. Patient expressed understanding

## 2020-06-08 NOTE — TELEPHONE ENCOUNTER
----- Message from Loly Cuba sent at 6/8/2020 11:30 AM CDT -----  Contact: Pt  Type: Returning Call    Who Called: Pt  Who Left the patient a message: Concepcion  Does the pt  know what this is regarding:yes  Best Call back number: [phone]  Additional information:  Pt requesting a call back

## 2020-06-08 NOTE — TELEPHONE ENCOUNTER
Left message on voicemail for pt to call back. Need to reschedule appt on 6/16/2020 with Dr. Garcia. Dr. Garcia will not be in the office. Can reschedule with either Dr. Garcia or Silvina Mancuso NP. Thanks, Concepcion

## 2020-06-12 PROBLEM — S60.212A CONTUSION OF LEFT WRIST: Status: ACTIVE | Noted: 2020-06-12

## 2020-06-12 NOTE — H&P
6/12/2020    Chief Complaint:  Chief Complaint   Patient presents with    Left Wrist - Pain       HPI:  Loren Lemus is a 44 y.o. female, who presents to clinic today she has a history of injury to left wrist.  She states that approximately week ago she swung her left awkwardly and hit her wrist on side of a door.  She states that she did have pain and swelling.  She has seen Dr. Thompson who did x-ray and was told that she did not have a fracture.  She was given Velcro brace.  She is here today for follow-up was she continued to have pain about the left wrist.  There are no other significant complaints noted.    PMHX:  Past Medical History:   Diagnosis Date    Endometriosis     Kidney stone     x 1    Migraine     Miscarriage     MVP (mitral valve prolapse)        PSHX:  Past Surgical History:   Procedure Laterality Date    BREAST SURGERY Bilateral     augmentation with implants    KNEE SURGERY Left     PELVIC LAPAROSCOPY      SEPTORHINOPLASTY  1988       FMHX:  Family History   Problem Relation Age of Onset    Nephrolithiasis Father         severe    Prostate cancer Paternal Grandfather     Clotting disorder Mother        SOCHX:  Social History     Tobacco Use    Smoking status: Never Smoker    Smokeless tobacco: Never Used   Substance Use Topics    Alcohol use: Yes       ALLERGIES:  Codeine; Oxycodone; and Latex, natural rubber    CURRENT MEDICATIONS:  Current Outpatient Medications on File Prior to Visit   Medication Sig Dispense Refill    cetirizine (ZYRTEC) 10 MG tablet ZYRTEC 10 MG TABS      DULoxetine (CYMBALTA) 30 MG capsule Take 30 mg by mouth 2 (two) times daily.  5    DULoxetine (CYMBALTA) 30 MG capsule Take 1 capsule (30 mg total) by mouth 2 (two) times daily. 60 capsule 5    fremanezumab-vfrm (AJOVY) 225 mg/1.5 mL injection Inject 1.5 mLs (225 mg total) into the skin every 30 days. 1.5 mL 5    methylPREDNISolone (MEDROL DOSEPACK) 4 mg tablet use as directed 1 Package 0     "ondansetron (ZOFRAN) 4 MG tablet Take 8 mg by mouth as needed for Nausea.      ondansetron (ZOFRAN) 4 MG tablet Take 1 tablet (4 mg total) by mouth every 6 (six) hours as needed for Nausea. 30 tablet 11    topiramate (TOPAMAX) 50 MG tablet Take 50 mg by mouth 2 (two) times daily.  5    topiramate (TOPAMAX) 50 MG tablet Take 1 tablet (50 mg total) by mouth 2 (two) times daily. 60 tablet 5    ZOMIG 5 mg Spry Use 1 spray in one nostril as needed for migraine. May repeat once in 2 hours. No more than 2 days/week. 6 each 11     Current Facility-Administered Medications on File Prior to Visit   Medication Dose Route Frequency Provider Last Rate Last Dose    onabotulinumtoxina injection 200 Units  200 Units Intramuscular Q90 Days Toshia Garcia MD   200 Units at 10/04/19 1323    onabotulinumtoxina injection 200 Units  200 Units Intramuscular Q90 Days Toshia Garcia MD   200 Units at 03/26/20 0826       REVIEW OF SYSTEMS:  Review of Systems   Constitutional: Negative.    HENT: Negative for hearing loss, nosebleeds and sore throat.    Respiratory: Negative for shortness of breath and wheezing.    Cardiovascular: Negative for chest pain and palpitations.   Gastrointestinal: Negative for heartburn, nausea and vomiting.   Genitourinary: Negative for dysuria and urgency.   Skin: Negative.    Neurological: Positive for headaches. Negative for seizures, loss of consciousness and weakness.       GENERAL PHYSICAL EXAM:   BP (!) 142/85   Pulse 90   Ht 5' 9" (1.753 m)   Wt 95.3 kg (210 lb)   LMP 11/19/2016   BMI 31.01 kg/m²    GEN: well developed, well nourished, no acute distress   HENT: Normocephalic, atraumatic   EYES: No discharge, conjunctiva normal   NECK: Supple, non-tender   PULM: No wheezing, no respiratory distress   CV: RRR   ABD: Soft, non-tender    ORTHO EXAM:   Examination left hand and wrist reveals that there is mild edema.  There is mild ecchymosis overlying region of ulnar styloid.  Palpation about the wrist " produces tenderness over the distal ulna and on the ulnar side of the carpus.  Range of motion of wrist is mildly limited due to pain.  She does have sensation which is grossly intact in the median radial ulnar distribution.  Capillary refill is less than 2 sec in all the digits.    RADIOLOGY:   X-rays of the left wrist were taken in clinic today.  The films were reviewed by me.  There are fractures or dislocations noted.    ASSESSMENT:   Left wrist contusion    PLAN:  1.  She will continue to wear the Velcro brace    2.  She will follow up with me in 2 weeks with a repeat x-ray of the left wrist.  If there is no change in the appearance of the x-ray at that time and may consider the need for MRI if the patient continues to have significant pain

## 2020-06-19 DIAGNOSIS — M25.532 LEFT WRIST PAIN: Primary | ICD-10-CM

## 2020-06-22 ENCOUNTER — OFFICE VISIT (OUTPATIENT)
Dept: ORTHOPEDICS | Facility: CLINIC | Age: 45
End: 2020-06-22
Payer: COMMERCIAL

## 2020-06-22 ENCOUNTER — HOSPITAL ENCOUNTER (OUTPATIENT)
Dept: RADIOLOGY | Facility: HOSPITAL | Age: 45
Discharge: HOME | End: 2020-06-22
Attending: ORTHOPAEDIC SURGERY
Payer: COMMERCIAL

## 2020-06-22 VITALS
HEIGHT: 69 IN | BODY MASS INDEX: 31.1 KG/M2 | HEART RATE: 119 BPM | DIASTOLIC BLOOD PRESSURE: 87 MMHG | SYSTOLIC BLOOD PRESSURE: 131 MMHG | WEIGHT: 210 LBS

## 2020-06-22 DIAGNOSIS — M25.532 LEFT WRIST PAIN: ICD-10-CM

## 2020-06-22 DIAGNOSIS — S69.82XD TFCC (TRIANGULAR FIBROCARTILAGE COMPLEX) INJURY, LEFT, SUBSEQUENT ENCOUNTER: Primary | ICD-10-CM

## 2020-06-22 PROCEDURE — 99213 OFFICE O/P EST LOW 20 MIN: CPT | Mod: S$GLB,,, | Performed by: ORTHOPAEDIC SURGERY

## 2020-06-22 PROCEDURE — 73110 X-RAY EXAM OF WRIST: CPT | Mod: TC,PO,LT

## 2020-06-22 PROCEDURE — 73110 XR WRIST COMPLETE 3 VIEWS LEFT: ICD-10-PCS | Mod: 26,LT,, | Performed by: RADIOLOGY

## 2020-06-22 PROCEDURE — 99999 PR PBB SHADOW E&M-EST. PATIENT-LVL IV: ICD-10-PCS | Mod: PBBFAC,,, | Performed by: ORTHOPAEDIC SURGERY

## 2020-06-22 PROCEDURE — 3008F BODY MASS INDEX DOCD: CPT | Mod: CPTII,S$GLB,, | Performed by: ORTHOPAEDIC SURGERY

## 2020-06-22 PROCEDURE — 99999 PR PBB SHADOW E&M-EST. PATIENT-LVL IV: CPT | Mod: PBBFAC,,, | Performed by: ORTHOPAEDIC SURGERY

## 2020-06-22 PROCEDURE — 3008F PR BODY MASS INDEX (BMI) DOCUMENTED: ICD-10-PCS | Mod: CPTII,S$GLB,, | Performed by: ORTHOPAEDIC SURGERY

## 2020-06-22 PROCEDURE — 73110 X-RAY EXAM OF WRIST: CPT | Mod: 26,LT,, | Performed by: RADIOLOGY

## 2020-06-22 PROCEDURE — 99213 PR OFFICE/OUTPT VISIT, EST, LEVL III, 20-29 MIN: ICD-10-PCS | Mod: S$GLB,,, | Performed by: ORTHOPAEDIC SURGERY

## 2020-06-22 NOTE — PROGRESS NOTES
Loren returns to clinic today.  She had a history of injury to her left wrist.  It has now been 3 weeks since the injury.  X-rays have been negative up to this point but she is still complaining of swelling and pain to the left wrist.  States that any attempt at motion does increase her pain.  She has been wearing a Velcro brace.    Physical exam:  Examination of the left wrist and hand reveals that there is still mild to moderate edema about the wrist.  There is no remaining ecchymosis.  Palpation produces minimal tenderness over the region of the snuffbox.  She does have moderate to severe tenderness over the region of the TFCC.  There is no tenderness over the more proximal ulna.  There is no significant tenderness over the ECU.  Wrist range of motion is significantly limited with extension of 40° and flexion of 40°.  He does have a 2+ radial pulse and sensation is intact.    Radiology:  X-rays of the left wrist were again taken in clinic today.  Those films have been reviewed by me.  There are no fractures or dislocations noted    Assessment:  Left wrist contusion versus sprain with TFCC injury    Plan:    1.  Due to the lack of improvement, negative x-rays, and persistent swelling I will send the patient for an MRI of the left wrist the further evaluate for ligamentous structures as well as to evaluate the scaphoid and to evaluate the TFCC.    2.  The patient will continue to wear the Velcro brace    3.  Follow up with me as soon as the MRI is completed for discussion of treatment options

## 2020-06-26 ENCOUNTER — HOSPITAL ENCOUNTER (OUTPATIENT)
Dept: RADIOLOGY | Facility: HOSPITAL | Age: 45
Discharge: HOME | End: 2020-06-26
Attending: ORTHOPAEDIC SURGERY
Payer: COMMERCIAL

## 2020-06-26 DIAGNOSIS — M25.532 LEFT WRIST PAIN: ICD-10-CM

## 2020-06-26 DIAGNOSIS — S69.82XD TFCC (TRIANGULAR FIBROCARTILAGE COMPLEX) INJURY, LEFT, SUBSEQUENT ENCOUNTER: ICD-10-CM

## 2020-06-26 PROCEDURE — 73221 MRI JOINT UPR EXTREM W/O DYE: CPT | Mod: 26,LT,, | Performed by: RADIOLOGY

## 2020-06-26 PROCEDURE — 73221 MRI WRIST WITHOUT CONTRAST LEFT: ICD-10-PCS | Mod: 26,LT,, | Performed by: RADIOLOGY

## 2020-06-26 PROCEDURE — 73221 MRI JOINT UPR EXTREM W/O DYE: CPT | Mod: TC,LT

## 2020-07-01 ENCOUNTER — PROCEDURE VISIT (OUTPATIENT)
Dept: NEUROLOGY | Facility: CLINIC | Age: 45
End: 2020-07-01
Payer: COMMERCIAL

## 2020-07-01 VITALS
DIASTOLIC BLOOD PRESSURE: 81 MMHG | BODY MASS INDEX: 31.25 KG/M2 | WEIGHT: 211 LBS | HEART RATE: 93 BPM | RESPIRATION RATE: 16 BRPM | HEIGHT: 69 IN | SYSTOLIC BLOOD PRESSURE: 132 MMHG

## 2020-07-01 DIAGNOSIS — G43.711 INTRACTABLE CHRONIC MIGRAINE WITHOUT AURA AND WITH STATUS MIGRAINOSUS: Primary | ICD-10-CM

## 2020-07-01 PROCEDURE — 64615 CHEMODENERV MUSC MIGRAINE: CPT | Mod: S$GLB,,, | Performed by: PSYCHIATRY & NEUROLOGY

## 2020-07-01 PROCEDURE — 96372 THER/PROPH/DIAG INJ SC/IM: CPT | Mod: 59,S$GLB,, | Performed by: PSYCHIATRY & NEUROLOGY

## 2020-07-01 PROCEDURE — 64615 PR CHEMODENERVATION OF MUSCLE FOR CHRONIC MIGRAINE: ICD-10-PCS | Mod: S$GLB,,, | Performed by: PSYCHIATRY & NEUROLOGY

## 2020-07-01 PROCEDURE — 96372 PR INJECTION,THERAP/PROPH/DIAG2ST, IM OR SUBCUT: ICD-10-PCS | Mod: 59,S$GLB,, | Performed by: PSYCHIATRY & NEUROLOGY

## 2020-07-01 PROCEDURE — 99499 UNLISTED E&M SERVICE: CPT | Mod: JW,JG,S$GLB, | Performed by: PSYCHIATRY & NEUROLOGY

## 2020-07-01 PROCEDURE — 99499 NO LOS: ICD-10-PCS | Mod: JW,JG,S$GLB, | Performed by: PSYCHIATRY & NEUROLOGY

## 2020-07-01 RX ORDER — KETOROLAC TROMETHAMINE 30 MG/ML
60 INJECTION, SOLUTION INTRAMUSCULAR; INTRAVENOUS
Status: COMPLETED | OUTPATIENT
Start: 2020-07-01 | End: 2020-07-01

## 2020-07-01 RX ORDER — KETOROLAC TROMETHAMINE 10 MG/1
10 TABLET, FILM COATED ORAL EVERY 6 HOURS
COMMUNITY

## 2020-07-01 RX ADMIN — KETOROLAC TROMETHAMINE 60 MG: 30 INJECTION, SOLUTION INTRAMUSCULAR; INTRAVENOUS at 04:07

## 2020-07-01 NOTE — PROCEDURES
Procedures     GIVE TORADOL 60MG IM WITH INJECTION EACH TIME     PROCEDURE PERFORMED: Botulinum toxin injection (86877)    CLINICAL INDICATION: G43.719    A time out was conducted just before the start of the procedure to verify the correct patient and procedure, procedure location, and all relevant critical information.     Conventional methods of treatment such as multiple medications, both on and   off label have been tried including: anti-epileptics (topiramate, gabapentin) and antidepressants (duloxetine    The patient has been unresponsive and refractory.The patient meets criteria for chronic headaches according to the ICHD-II, the patient has more than 15 headaches a month which last for more than 4 hours a day.    Botox session number: 4  Last session was 12 weeks ago and resulted in improvement of: >90% (no headaches until week 11)    I am aiming for at least 50%  improvement in the patient's symptoms. Frequency of treatment is every 3 months unless no response to the treatments, at which time we will discontinue the injections.     DESCRIPTION OF PROCEDURE: After obtaining informed consent and under   aseptic technique, a total of 135 units of botulinum toxin type A were   injected in the following muscles:     -- Procerus 5 units  --  5 units bilaterally  -- Temporalis 20 units bilaterally  -- Occipitalis 15 units bilaterally  -- Upper cervical paraspinals 10 units bilaterally  -- Trapezius 15 units bilaterally.     Spared due to anatomy -   -- Frontalis 20 units    The patient tolerated the procedure well. There were no complications. The patient was given a prescription for repeat treatment in 11 weeks     Unavoidable waste 65 units    Toshia Garcia MD

## 2020-08-10 RX ORDER — DULOXETIN HYDROCHLORIDE 30 MG/1
30 CAPSULE, DELAYED RELEASE ORAL 2 TIMES DAILY
Qty: 60 CAPSULE | Refills: 5 | Status: SHIPPED | OUTPATIENT
Start: 2020-08-10 | End: 2021-03-22 | Stop reason: SDUPTHER

## 2020-08-10 RX ORDER — TOPIRAMATE 50 MG/1
50 TABLET, FILM COATED ORAL 2 TIMES DAILY
Qty: 60 TABLET | Refills: 5 | Status: SHIPPED | OUTPATIENT
Start: 2020-08-10 | End: 2021-03-22 | Stop reason: SDUPTHER

## 2020-09-16 ENCOUNTER — PROCEDURE VISIT (OUTPATIENT)
Dept: NEUROLOGY | Facility: CLINIC | Age: 45
End: 2020-09-16
Payer: COMMERCIAL

## 2020-09-16 VITALS
RESPIRATION RATE: 17 BRPM | DIASTOLIC BLOOD PRESSURE: 81 MMHG | HEIGHT: 69 IN | HEART RATE: 103 BPM | BODY MASS INDEX: 30.19 KG/M2 | SYSTOLIC BLOOD PRESSURE: 124 MMHG | WEIGHT: 203.81 LBS

## 2020-09-16 DIAGNOSIS — G43.711 INTRACTABLE CHRONIC MIGRAINE WITHOUT AURA AND WITH STATUS MIGRAINOSUS: Primary | ICD-10-CM

## 2020-09-16 PROCEDURE — 64615 PR CHEMODENERVATION OF MUSCLE FOR CHRONIC MIGRAINE: ICD-10-PCS | Mod: S$GLB,,, | Performed by: PSYCHIATRY & NEUROLOGY

## 2020-09-16 PROCEDURE — 64615 CHEMODENERV MUSC MIGRAINE: CPT | Mod: S$GLB,,, | Performed by: PSYCHIATRY & NEUROLOGY

## 2020-09-16 NOTE — PROCEDURES
Procedures     PROCEDURE PERFORMED: Botulinum toxin injection (54085)    CLINICAL INDICATION: G43.719    A time out was conducted just before the start of the procedure to verify the correct patient and procedure, procedure location, and all relevant critical information.     Conventional methods of treatment such as multiple medications, both on and   off label have been tried including: anti-epileptics (topiramate, gabapentin) and antidepressants (duloxetine    The patient has been unresponsive and refractory.The patient meets criteria for chronic headaches according to the ICHD-II, the patient has more than 15 headaches a month which last for more than 4 hours a day.    Botox session number: 4  Last session was 12 weeks ago and resulted in improvement of: >90% (no headaches until week 11)    I am aiming for at least 50%  improvement in the patient's symptoms. Frequency of treatment is every 3 months unless no response to the treatments, at which time we will discontinue the injections.     DESCRIPTION OF PROCEDURE: After obtaining informed consent and under   aseptic technique, a total of 135 units of botulinum toxin type A were   injected in the following muscles:     -- Procerus 5 units  --  5 units bilaterally  -- Temporalis 20 units bilaterally  -- Occipitalis 15 units bilaterally  -- Upper cervical paraspinals 10 units bilaterally  -- Trapezius 15 units bilaterally.     Spared due to anatomy -   -- Frontalis 20 units    The patient tolerated the procedure well. There were no complications. The patient was given a prescription for repeat treatment in 11 weeks     Unavoidable waste 65 units      Mitchell Hill M.D  Medical Director, Headache and Facial Pain  [facility]

## 2020-09-25 DIAGNOSIS — G43.711 INTRACTABLE CHRONIC MIGRAINE WITHOUT AURA AND WITH STATUS MIGRAINOSUS: ICD-10-CM

## 2020-09-25 RX ORDER — ZOLMITRIPTAN 5 MG/1
SPRAY NASAL
Qty: 6 EACH | Refills: 11 | Status: SHIPPED | OUTPATIENT
Start: 2020-09-25 | End: 2021-09-28 | Stop reason: SDUPTHER

## 2020-11-27 DIAGNOSIS — S60.212A CONTUSION OF LEFT WRIST, INITIAL ENCOUNTER: ICD-10-CM

## 2020-11-30 RX ORDER — KETOROLAC TROMETHAMINE 10 MG/1
10 TABLET, FILM COATED ORAL EVERY 6 HOURS
Qty: 20 TABLET | Refills: 0 | Status: SHIPPED | OUTPATIENT
Start: 2020-11-30 | End: 2020-12-05

## 2020-12-01 ENCOUNTER — TELEPHONE (OUTPATIENT)
Dept: NEUROLOGY | Facility: CLINIC | Age: 45
End: 2020-12-01

## 2020-12-01 DIAGNOSIS — G43.711 INTRACTABLE CHRONIC MIGRAINE WITHOUT AURA AND WITH STATUS MIGRAINOSUS: Primary | ICD-10-CM

## 2020-12-02 RX ORDER — NAPROXEN SODIUM 550 MG/1
550 TABLET ORAL 2 TIMES DAILY
Qty: 60 TABLET | Refills: 2 | Status: SHIPPED | OUTPATIENT
Start: 2020-12-02 | End: 2021-09-14 | Stop reason: SDUPTHER

## 2020-12-04 ENCOUNTER — PROCEDURE VISIT (OUTPATIENT)
Dept: NEUROLOGY | Facility: CLINIC | Age: 45
End: 2020-12-04
Payer: COMMERCIAL

## 2020-12-04 VITALS — RESPIRATION RATE: 20 BRPM | TEMPERATURE: 97 F | BODY MASS INDEX: 28.49 KG/M2 | WEIGHT: 192.88 LBS

## 2020-12-04 DIAGNOSIS — G43.711 CHRONIC MIGRAINE WITHOUT AURA, WITH INTRACTABLE MIGRAINE, SO STATED, WITH STATUS MIGRAINOSUS: Primary | ICD-10-CM

## 2020-12-04 DIAGNOSIS — G43.711 INTRACTABLE CHRONIC MIGRAINE WITHOUT AURA AND WITH STATUS MIGRAINOSUS: ICD-10-CM

## 2020-12-04 PROCEDURE — 64615 CHEMODENERV MUSC MIGRAINE: CPT | Mod: S$GLB,,, | Performed by: PSYCHIATRY & NEUROLOGY

## 2020-12-04 PROCEDURE — 64615 PR CHEMODENERVATION OF MUSCLE FOR CHRONIC MIGRAINE: ICD-10-PCS | Mod: S$GLB,,, | Performed by: PSYCHIATRY & NEUROLOGY

## 2020-12-04 RX ORDER — PROPRANOLOL HYDROCHLORIDE 10 MG/1
10 TABLET ORAL 3 TIMES DAILY PRN
Qty: 90 TABLET | Refills: 11 | Status: SHIPPED | OUTPATIENT
Start: 2020-12-04 | End: 2021-07-08

## 2021-02-19 ENCOUNTER — PROCEDURE VISIT (OUTPATIENT)
Dept: NEUROLOGY | Facility: CLINIC | Age: 46
End: 2021-02-19
Payer: COMMERCIAL

## 2021-02-19 VITALS
BODY MASS INDEX: 26.27 KG/M2 | TEMPERATURE: 99 F | HEART RATE: 84 BPM | WEIGHT: 177.38 LBS | SYSTOLIC BLOOD PRESSURE: 115 MMHG | RESPIRATION RATE: 17 BRPM | DIASTOLIC BLOOD PRESSURE: 78 MMHG | HEIGHT: 69 IN

## 2021-02-19 DIAGNOSIS — G43.719 INTRACTABLE CHRONIC MIGRAINE WITHOUT AURA AND WITHOUT STATUS MIGRAINOSUS: Primary | ICD-10-CM

## 2021-02-19 PROCEDURE — 64615 CHEMODENERV MUSC MIGRAINE: CPT | Mod: S$GLB,,, | Performed by: PSYCHIATRY & NEUROLOGY

## 2021-02-19 PROCEDURE — 64615 PR CHEMODENERVATION OF MUSCLE FOR CHRONIC MIGRAINE: ICD-10-PCS | Mod: S$GLB,,, | Performed by: PSYCHIATRY & NEUROLOGY

## 2021-03-22 DIAGNOSIS — G43.711 INTRACTABLE CHRONIC MIGRAINE WITHOUT AURA AND WITH STATUS MIGRAINOSUS: ICD-10-CM

## 2021-03-22 RX ORDER — ONDANSETRON 4 MG/1
4 TABLET, FILM COATED ORAL EVERY 6 HOURS PRN
Qty: 30 TABLET | Refills: 11 | Status: SHIPPED | OUTPATIENT
Start: 2021-03-22 | End: 2022-06-27 | Stop reason: SDUPTHER

## 2021-03-22 RX ORDER — DULOXETIN HYDROCHLORIDE 30 MG/1
30 CAPSULE, DELAYED RELEASE ORAL 2 TIMES DAILY
Qty: 60 CAPSULE | Refills: 5 | Status: SHIPPED | OUTPATIENT
Start: 2021-03-22 | End: 2021-09-14 | Stop reason: SDUPTHER

## 2021-03-22 RX ORDER — TOPIRAMATE 50 MG/1
50 TABLET, FILM COATED ORAL 2 TIMES DAILY
Qty: 60 TABLET | Refills: 5 | Status: SHIPPED | OUTPATIENT
Start: 2021-03-22 | End: 2021-09-14 | Stop reason: SDUPTHER

## 2021-03-26 ENCOUNTER — OFFICE VISIT (OUTPATIENT)
Dept: ENDOCRINOLOGY | Facility: CLINIC | Age: 46
End: 2021-03-26
Payer: COMMERCIAL

## 2021-03-26 ENCOUNTER — LAB VISIT (OUTPATIENT)
Dept: LAB | Facility: HOSPITAL | Age: 46
End: 2021-03-26
Attending: INTERNAL MEDICINE
Payer: COMMERCIAL

## 2021-03-26 VITALS
DIASTOLIC BLOOD PRESSURE: 70 MMHG | HEART RATE: 103 BPM | SYSTOLIC BLOOD PRESSURE: 114 MMHG | TEMPERATURE: 97 F | BODY MASS INDEX: 24.95 KG/M2 | HEIGHT: 69 IN | WEIGHT: 168.44 LBS | OXYGEN SATURATION: 96 %

## 2021-03-26 DIAGNOSIS — R00.0 TACHYCARDIA, UNSPECIFIED: ICD-10-CM

## 2021-03-26 DIAGNOSIS — G43.109 MIGRAINE WITH AURA AND WITHOUT STATUS MIGRAINOSUS, NOT INTRACTABLE: ICD-10-CM

## 2021-03-26 DIAGNOSIS — N95.1 PERIMENOPAUSE: ICD-10-CM

## 2021-03-26 DIAGNOSIS — R94.6 THYROID FUNCTION TEST ABNORMAL: ICD-10-CM

## 2021-03-26 DIAGNOSIS — E55.9 HYPOVITAMINOSIS D: ICD-10-CM

## 2021-03-26 DIAGNOSIS — Z90.710 HISTORY OF HYSTERECTOMY: ICD-10-CM

## 2021-03-26 DIAGNOSIS — E88.810 DYSMETABOLIC SYNDROME: ICD-10-CM

## 2021-03-26 DIAGNOSIS — R73.09 DYSGLYCEMIA: ICD-10-CM

## 2021-03-26 DIAGNOSIS — Z87.42 HISTORY OF ENDOMETRIOSIS: ICD-10-CM

## 2021-03-26 DIAGNOSIS — R63.5 WEIGHT GAIN: Primary | ICD-10-CM

## 2021-03-26 DIAGNOSIS — Z87.442 HISTORY OF NEPHROLITHIASIS: ICD-10-CM

## 2021-03-26 DIAGNOSIS — R63.5 WEIGHT GAIN: ICD-10-CM

## 2021-03-26 LAB
25(OH)D3+25(OH)D2 SERPL-MCNC: 29 NG/ML (ref 30–96)
ALBUMIN SERPL BCP-MCNC: 3.7 G/DL (ref 3.5–5.2)
ALP SERPL-CCNC: 86 U/L (ref 55–135)
ALT SERPL W/O P-5'-P-CCNC: 97 U/L (ref 10–44)
AMYLASE SERPL-CCNC: 81 U/L (ref 20–110)
ANION GAP SERPL CALC-SCNC: 10 MMOL/L (ref 8–16)
AST SERPL-CCNC: 29 U/L (ref 10–40)
BASOPHILS # BLD AUTO: 0.03 K/UL (ref 0–0.2)
BASOPHILS NFR BLD: 0.5 % (ref 0–1.9)
BILIRUB SERPL-MCNC: 1.1 MG/DL (ref 0.1–1)
BUN SERPL-MCNC: 9 MG/DL (ref 6–20)
CALCIUM SERPL-MCNC: 8.7 MG/DL (ref 8.7–10.5)
CHLORIDE SERPL-SCNC: 107 MMOL/L (ref 95–110)
CHOLEST SERPL-MCNC: 143 MG/DL (ref 120–199)
CHOLEST/HDLC SERPL: 2.8 {RATIO} (ref 2–5)
CO2 SERPL-SCNC: 23 MMOL/L (ref 23–29)
CREAT SERPL-MCNC: 0.7 MG/DL (ref 0.5–1.4)
DIFFERENTIAL METHOD: ABNORMAL
EOSINOPHIL # BLD AUTO: 0.1 K/UL (ref 0–0.5)
EOSINOPHIL NFR BLD: 1.9 % (ref 0–8)
ERYTHROCYTE [DISTWIDTH] IN BLOOD BY AUTOMATED COUNT: 12.6 % (ref 11.5–14.5)
EST. GFR  (AFRICAN AMERICAN): >60 ML/MIN/1.73 M^2
EST. GFR  (NON AFRICAN AMERICAN): >60 ML/MIN/1.73 M^2
ESTIMATED AVG GLUCOSE: 105 MG/DL (ref 68–131)
GLUCOSE SERPL-MCNC: 52 MG/DL (ref 70–110)
HBA1C MFR BLD: 5.3 % (ref 4–5.6)
HCT VFR BLD AUTO: 48.1 % (ref 37–48.5)
HDLC SERPL-MCNC: 52 MG/DL (ref 40–75)
HDLC SERPL: 36.4 % (ref 20–50)
HGB BLD-MCNC: 15.9 G/DL (ref 12–16)
IMM GRANULOCYTES # BLD AUTO: 0.03 K/UL (ref 0–0.04)
IMM GRANULOCYTES NFR BLD AUTO: 0.5 % (ref 0–0.5)
LDLC SERPL CALC-MCNC: 71 MG/DL (ref 63–159)
LIPASE SERPL-CCNC: 100 U/L (ref 4–60)
LYMPHOCYTES # BLD AUTO: 1.3 K/UL (ref 1–4.8)
LYMPHOCYTES NFR BLD: 21 % (ref 18–48)
MCH RBC QN AUTO: 28.8 PG (ref 27–31)
MCHC RBC AUTO-ENTMCNC: 33.1 G/DL (ref 32–36)
MCV RBC AUTO: 87 FL (ref 82–98)
MONOCYTES # BLD AUTO: 0.5 K/UL (ref 0.3–1)
MONOCYTES NFR BLD: 8.3 % (ref 4–15)
NEUTROPHILS # BLD AUTO: 4.2 K/UL (ref 1.8–7.7)
NEUTROPHILS NFR BLD: 67.8 % (ref 38–73)
NONHDLC SERPL-MCNC: 91 MG/DL
NRBC BLD-RTO: 0 /100 WBC
PLATELET # BLD AUTO: 224 K/UL (ref 150–350)
PMV BLD AUTO: 11.8 FL (ref 9.2–12.9)
POTASSIUM SERPL-SCNC: 3.6 MMOL/L (ref 3.5–5.1)
PROT SERPL-MCNC: 7 G/DL (ref 6–8.4)
PTH-INTACT SERPL-MCNC: 44 PG/ML (ref 9–77)
RBC # BLD AUTO: 5.53 M/UL (ref 4–5.4)
SODIUM SERPL-SCNC: 140 MMOL/L (ref 136–145)
T4 FREE SERPL-MCNC: 0.97 NG/DL (ref 0.71–1.51)
TRIGL SERPL-MCNC: 100 MG/DL (ref 30–150)
TSH SERPL DL<=0.005 MIU/L-ACNC: 1.37 UIU/ML (ref 0.4–4)
URATE SERPL-MCNC: 3.3 MG/DL (ref 2.4–5.7)
WBC # BLD AUTO: 6.18 K/UL (ref 3.9–12.7)

## 2021-03-26 PROCEDURE — 84146 ASSAY OF PROLACTIN: CPT | Performed by: INTERNAL MEDICINE

## 2021-03-26 PROCEDURE — 83001 ASSAY OF GONADOTROPIN (FSH): CPT | Performed by: INTERNAL MEDICINE

## 2021-03-26 PROCEDURE — 80061 LIPID PANEL: CPT | Performed by: INTERNAL MEDICINE

## 2021-03-26 PROCEDURE — 1126F AMNT PAIN NOTED NONE PRSNT: CPT | Mod: S$GLB,,, | Performed by: INTERNAL MEDICINE

## 2021-03-26 PROCEDURE — 82306 VITAMIN D 25 HYDROXY: CPT | Performed by: INTERNAL MEDICINE

## 2021-03-26 PROCEDURE — 84480 ASSAY TRIIODOTHYRONINE (T3): CPT | Performed by: INTERNAL MEDICINE

## 2021-03-26 PROCEDURE — 3008F BODY MASS INDEX DOCD: CPT | Mod: CPTII,S$GLB,, | Performed by: INTERNAL MEDICINE

## 2021-03-26 PROCEDURE — 3008F PR BODY MASS INDEX (BMI) DOCUMENTED: ICD-10-PCS | Mod: CPTII,S$GLB,, | Performed by: INTERNAL MEDICINE

## 2021-03-26 PROCEDURE — 82672 ASSAY OF ESTROGEN: CPT | Performed by: INTERNAL MEDICINE

## 2021-03-26 PROCEDURE — 36415 COLL VENOUS BLD VENIPUNCTURE: CPT | Mod: PO | Performed by: INTERNAL MEDICINE

## 2021-03-26 PROCEDURE — 83690 ASSAY OF LIPASE: CPT | Performed by: INTERNAL MEDICINE

## 2021-03-26 PROCEDURE — 99204 OFFICE O/P NEW MOD 45 MIN: CPT | Mod: S$GLB,,, | Performed by: INTERNAL MEDICINE

## 2021-03-26 PROCEDURE — 1126F PR PAIN SEVERITY QUANTIFIED, NO PAIN PRESENT: ICD-10-PCS | Mod: S$GLB,,, | Performed by: INTERNAL MEDICINE

## 2021-03-26 PROCEDURE — 85025 COMPLETE CBC W/AUTO DIFF WBC: CPT | Performed by: INTERNAL MEDICINE

## 2021-03-26 PROCEDURE — 82308 ASSAY OF CALCITONIN: CPT | Performed by: INTERNAL MEDICINE

## 2021-03-26 PROCEDURE — 99999 PR PBB SHADOW E&M-EST. PATIENT-LVL III: ICD-10-PCS | Mod: PBBFAC,,, | Performed by: INTERNAL MEDICINE

## 2021-03-26 PROCEDURE — 99999 PR PBB SHADOW E&M-EST. PATIENT-LVL III: CPT | Mod: PBBFAC,,, | Performed by: INTERNAL MEDICINE

## 2021-03-26 PROCEDURE — 82150 ASSAY OF AMYLASE: CPT | Performed by: INTERNAL MEDICINE

## 2021-03-26 PROCEDURE — 84443 ASSAY THYROID STIM HORMONE: CPT | Performed by: INTERNAL MEDICINE

## 2021-03-26 PROCEDURE — 84439 ASSAY OF FREE THYROXINE: CPT | Performed by: INTERNAL MEDICINE

## 2021-03-26 PROCEDURE — 83036 HEMOGLOBIN GLYCOSYLATED A1C: CPT | Performed by: INTERNAL MEDICINE

## 2021-03-26 PROCEDURE — 86800 THYROGLOBULIN ANTIBODY: CPT | Performed by: INTERNAL MEDICINE

## 2021-03-26 PROCEDURE — 99204 PR OFFICE/OUTPT VISIT, NEW, LEVL IV, 45-59 MIN: ICD-10-PCS | Mod: S$GLB,,, | Performed by: INTERNAL MEDICINE

## 2021-03-26 PROCEDURE — 84144 ASSAY OF PROGESTERONE: CPT | Performed by: INTERNAL MEDICINE

## 2021-03-26 PROCEDURE — 82330 ASSAY OF CALCIUM: CPT | Performed by: INTERNAL MEDICINE

## 2021-03-26 PROCEDURE — 84550 ASSAY OF BLOOD/URIC ACID: CPT | Performed by: INTERNAL MEDICINE

## 2021-03-26 PROCEDURE — 80053 COMPREHEN METABOLIC PANEL: CPT | Performed by: INTERNAL MEDICINE

## 2021-03-26 PROCEDURE — 82670 ASSAY OF TOTAL ESTRADIOL: CPT | Performed by: INTERNAL MEDICINE

## 2021-03-26 PROCEDURE — 83002 ASSAY OF GONADOTROPIN (LH): CPT | Performed by: INTERNAL MEDICINE

## 2021-03-26 PROCEDURE — 83970 ASSAY OF PARATHORMONE: CPT | Performed by: INTERNAL MEDICINE

## 2021-03-27 PROBLEM — E55.9 HYPOVITAMINOSIS D: Status: ACTIVE | Noted: 2021-03-27

## 2021-03-27 PROBLEM — N95.1 PERIMENOPAUSE: Status: ACTIVE | Noted: 2021-03-27

## 2021-03-27 LAB
CA-I BLDV-SCNC: 1.27 MMOL/L (ref 1.06–1.42)
ESTRADIOL SERPL-MCNC: 71 PG/ML
FSH SERPL-ACNC: 15.9 MIU/ML
LH SERPL-ACNC: 15.6 MIU/ML
PROGEST SERPL-MCNC: 0.7 NG/ML
PROLACTIN SERPL IA-MCNC: 15 NG/ML (ref 5.2–26.5)
T3 SERPL-MCNC: 93 NG/DL (ref 60–180)

## 2021-03-29 LAB
ESTROGEN SERPL-MCNC: 254 PG/ML
THRYOGLOBULIN INTERPRETATION: ABNORMAL
THYROGLOB AB SERPL-ACNC: <1.8 IU/ML
THYROGLOB SERPL-MCNC: 10 NG/ML

## 2021-03-30 LAB — CALCIT SERPL-MCNC: <5 PG/ML

## 2021-04-06 ENCOUNTER — LAB VISIT (OUTPATIENT)
Dept: LAB | Facility: HOSPITAL | Age: 46
End: 2021-04-06
Attending: INTERNAL MEDICINE
Payer: COMMERCIAL

## 2021-04-06 DIAGNOSIS — M79.10 MYALGIA: ICD-10-CM

## 2021-04-06 DIAGNOSIS — R20.2 PARESTHESIA: ICD-10-CM

## 2021-04-06 DIAGNOSIS — R21 MALAR RASH: Primary | ICD-10-CM

## 2021-04-06 DIAGNOSIS — R21 MALAR RASH: ICD-10-CM

## 2021-04-06 DIAGNOSIS — M79.10 MYALGIA: Primary | ICD-10-CM

## 2021-04-06 LAB — ERYTHROCYTE [SEDIMENTATION RATE] IN BLOOD BY WESTERGREN METHOD: 1 MM/HR (ref 0–20)

## 2021-04-06 PROCEDURE — 86039 ANTINUCLEAR ANTIBODIES (ANA): CPT | Performed by: INTERNAL MEDICINE

## 2021-04-06 PROCEDURE — 86038 ANTINUCLEAR ANTIBODIES: CPT | Performed by: INTERNAL MEDICINE

## 2021-04-06 PROCEDURE — 36415 COLL VENOUS BLD VENIPUNCTURE: CPT | Mod: PO | Performed by: INTERNAL MEDICINE

## 2021-04-06 PROCEDURE — 86141 C-REACTIVE PROTEIN HS: CPT | Performed by: INTERNAL MEDICINE

## 2021-04-06 PROCEDURE — 84550 ASSAY OF BLOOD/URIC ACID: CPT | Performed by: INTERNAL MEDICINE

## 2021-04-06 PROCEDURE — 85651 RBC SED RATE NONAUTOMATED: CPT | Mod: PO | Performed by: INTERNAL MEDICINE

## 2021-04-06 PROCEDURE — 83735 ASSAY OF MAGNESIUM: CPT | Performed by: INTERNAL MEDICINE

## 2021-04-06 PROCEDURE — 86235 NUCLEAR ANTIGEN ANTIBODY: CPT | Mod: 59 | Performed by: INTERNAL MEDICINE

## 2021-04-06 PROCEDURE — 82550 ASSAY OF CK (CPK): CPT | Performed by: INTERNAL MEDICINE

## 2021-04-06 PROCEDURE — 83615 LACTATE (LD) (LDH) ENZYME: CPT | Performed by: INTERNAL MEDICINE

## 2021-04-06 PROCEDURE — 84100 ASSAY OF PHOSPHORUS: CPT | Performed by: INTERNAL MEDICINE

## 2021-04-06 PROCEDURE — 86235 NUCLEAR ANTIGEN ANTIBODY: CPT | Performed by: INTERNAL MEDICINE

## 2021-04-07 LAB
ANTI-SSA ANTIBODY: 0.05 RATIO (ref 0–0.99)
ANTI-SSA INTERPRETATION: NEGATIVE
ANTI-SSB ANTIBODY: 0.06 RATIO (ref 0–0.99)
ANTI-SSB INTERPRETATION: NEGATIVE
CK SERPL-CCNC: 43 U/L (ref 20–180)
CRP SERPL-MCNC: 0.83 MG/L (ref 0–3.19)
LDH SERPL L TO P-CCNC: 179 U/L (ref 110–260)
MAGNESIUM SERPL-MCNC: 2.1 MG/DL (ref 1.6–2.6)
PHOSPHATE SERPL-MCNC: 3.5 MG/DL (ref 2.7–4.5)
URATE SERPL-MCNC: 4 MG/DL (ref 2.4–5.7)

## 2021-04-08 ENCOUNTER — TELEPHONE (OUTPATIENT)
Dept: RHEUMATOLOGY | Facility: CLINIC | Age: 46
End: 2021-04-08

## 2021-04-08 LAB
ANA PATTERN 1: NORMAL
ANA SER QL IF: POSITIVE
ANA TITR SER IF: NORMAL {TITER}

## 2021-04-09 LAB
ENA JO1 AB SER IA-ACNC: NORMAL AI
ENA SCL70 AB SER-ACNC: 4 UNITS

## 2021-04-10 LAB
ANTI SM ANTIBODY: 0.05 RATIO (ref 0–0.99)
ANTI SM/RNP ANTIBODY: 0.08 RATIO (ref 0–0.99)
ANTI-SM INTERPRETATION: NEGATIVE
ANTI-SM/RNP INTERPRETATION: NEGATIVE
ANTI-SSA ANTIBODY: 0.05 RATIO (ref 0–0.99)
ANTI-SSA INTERPRETATION: NEGATIVE
ANTI-SSB ANTIBODY: 0.06 RATIO (ref 0–0.99)
ANTI-SSB INTERPRETATION: NEGATIVE
DSDNA AB SER-ACNC: NORMAL [IU]/ML

## 2021-05-07 ENCOUNTER — PROCEDURE VISIT (OUTPATIENT)
Dept: NEUROLOGY | Facility: CLINIC | Age: 46
End: 2021-05-07
Payer: COMMERCIAL

## 2021-05-07 VITALS
HEIGHT: 69 IN | SYSTOLIC BLOOD PRESSURE: 108 MMHG | TEMPERATURE: 98 F | BODY MASS INDEX: 25.19 KG/M2 | WEIGHT: 170.06 LBS | HEART RATE: 81 BPM | DIASTOLIC BLOOD PRESSURE: 77 MMHG | RESPIRATION RATE: 17 BRPM

## 2021-05-07 DIAGNOSIS — G43.719 INTRACTABLE CHRONIC MIGRAINE WITHOUT AURA AND WITHOUT STATUS MIGRAINOSUS: Primary | ICD-10-CM

## 2021-05-07 PROCEDURE — 64615 PR CHEMODENERVATION OF MUSCLE FOR CHRONIC MIGRAINE: ICD-10-PCS | Mod: S$GLB,,, | Performed by: PSYCHIATRY & NEUROLOGY

## 2021-05-07 PROCEDURE — 64615 CHEMODENERV MUSC MIGRAINE: CPT | Mod: S$GLB,,, | Performed by: PSYCHIATRY & NEUROLOGY

## 2021-05-26 ENCOUNTER — TELEPHONE (OUTPATIENT)
Dept: RHEUMATOLOGY | Facility: CLINIC | Age: 46
End: 2021-05-26

## 2021-05-26 DIAGNOSIS — R76.8 ANA POSITIVE: Primary | ICD-10-CM

## 2021-05-26 RX ORDER — HYDROXYCHLOROQUINE SULFATE 200 MG/1
200 TABLET, FILM COATED ORAL 2 TIMES DAILY
Qty: 60 TABLET | Refills: 3 | Status: SHIPPED | OUTPATIENT
Start: 2021-05-26 | End: 2021-07-07 | Stop reason: SDUPTHER

## 2021-06-02 ENCOUNTER — OFFICE VISIT (OUTPATIENT)
Dept: RHEUMATOLOGY | Facility: CLINIC | Age: 46
End: 2021-06-02
Payer: COMMERCIAL

## 2021-06-02 VITALS
HEART RATE: 84 BPM | DIASTOLIC BLOOD PRESSURE: 85 MMHG | SYSTOLIC BLOOD PRESSURE: 118 MMHG | HEIGHT: 69 IN | BODY MASS INDEX: 25.03 KG/M2 | WEIGHT: 169 LBS

## 2021-06-02 DIAGNOSIS — R07.89 MUSCULOSKELETAL CHEST PAIN: ICD-10-CM

## 2021-06-02 DIAGNOSIS — R76.8 ANA POSITIVE: ICD-10-CM

## 2021-06-02 DIAGNOSIS — I73.00 RAYNAUD'S DISEASE WITHOUT GANGRENE: Primary | ICD-10-CM

## 2021-06-02 DIAGNOSIS — M35.1 MCTD (MIXED CONNECTIVE TISSUE DISEASE): ICD-10-CM

## 2021-06-02 DIAGNOSIS — G43.011 INTRACTABLE MIGRAINE WITHOUT AURA AND WITH STATUS MIGRAINOSUS: ICD-10-CM

## 2021-06-02 PROCEDURE — 99205 PR OFFICE/OUTPT VISIT, NEW, LEVL V, 60-74 MIN: ICD-10-PCS | Mod: 25,S$GLB,, | Performed by: INTERNAL MEDICINE

## 2021-06-02 PROCEDURE — 99205 OFFICE O/P NEW HI 60 MIN: CPT | Mod: 25,S$GLB,, | Performed by: INTERNAL MEDICINE

## 2021-06-02 PROCEDURE — 3008F BODY MASS INDEX DOCD: CPT | Mod: CPTII,S$GLB,, | Performed by: INTERNAL MEDICINE

## 2021-06-02 PROCEDURE — 3008F PR BODY MASS INDEX (BMI) DOCUMENTED: ICD-10-PCS | Mod: CPTII,S$GLB,, | Performed by: INTERNAL MEDICINE

## 2021-06-02 PROCEDURE — 96372 THER/PROPH/DIAG INJ SC/IM: CPT | Mod: S$GLB,,, | Performed by: INTERNAL MEDICINE

## 2021-06-02 PROCEDURE — 99999 PR PBB SHADOW E&M-EST. PATIENT-LVL III: CPT | Mod: PBBFAC,,, | Performed by: INTERNAL MEDICINE

## 2021-06-02 PROCEDURE — 99999 PR PBB SHADOW E&M-EST. PATIENT-LVL III: ICD-10-PCS | Mod: PBBFAC,,, | Performed by: INTERNAL MEDICINE

## 2021-06-02 PROCEDURE — 96372 PR INJECTION,THERAP/PROPH/DIAG2ST, IM OR SUBCUT: ICD-10-PCS | Mod: S$GLB,,, | Performed by: INTERNAL MEDICINE

## 2021-06-02 RX ORDER — CYANOCOBALAMIN 1000 UG/ML
1000 INJECTION, SOLUTION INTRAMUSCULAR; SUBCUTANEOUS
Status: COMPLETED | OUTPATIENT
Start: 2021-06-02 | End: 2021-06-02

## 2021-06-02 RX ORDER — KETOROLAC TROMETHAMINE 30 MG/ML
60 INJECTION, SOLUTION INTRAMUSCULAR; INTRAVENOUS
Status: COMPLETED | OUTPATIENT
Start: 2021-06-02 | End: 2021-06-02

## 2021-06-02 RX ADMIN — KETOROLAC TROMETHAMINE 60 MG: 30 INJECTION, SOLUTION INTRAMUSCULAR; INTRAVENOUS at 01:06

## 2021-06-02 RX ADMIN — CYANOCOBALAMIN 1000 MCG: 1000 INJECTION, SOLUTION INTRAMUSCULAR; SUBCUTANEOUS at 01:06

## 2021-06-02 ASSESSMENT — ROUTINE ASSESSMENT OF PATIENT INDEX DATA (RAPID3)
FATIGUE SCORE: 3.3
TOTAL RAPID3 SCORE: 4.67
PSYCHOLOGICAL DISTRESS SCORE: 3.3
PAIN SCORE: 5
PATIENT GLOBAL ASSESSMENT SCORE: 7
MDHAQ FUNCTION SCORE: 0.6

## 2021-06-03 ENCOUNTER — LAB VISIT (OUTPATIENT)
Dept: LAB | Facility: HOSPITAL | Age: 46
End: 2021-06-03
Attending: INTERNAL MEDICINE
Payer: COMMERCIAL

## 2021-06-03 ENCOUNTER — TELEPHONE (OUTPATIENT)
Dept: ENDOCRINOLOGY | Facility: CLINIC | Age: 46
End: 2021-06-03

## 2021-06-03 DIAGNOSIS — R73.09 DYSGLYCEMIA: Primary | ICD-10-CM

## 2021-06-03 DIAGNOSIS — G43.011 INTRACTABLE MIGRAINE WITHOUT AURA AND WITH STATUS MIGRAINOSUS: ICD-10-CM

## 2021-06-03 DIAGNOSIS — M35.1 MCTD (MIXED CONNECTIVE TISSUE DISEASE): ICD-10-CM

## 2021-06-03 DIAGNOSIS — R76.8 ANA POSITIVE: ICD-10-CM

## 2021-06-03 DIAGNOSIS — R07.89 MUSCULOSKELETAL CHEST PAIN: ICD-10-CM

## 2021-06-03 DIAGNOSIS — I73.00 RAYNAUD'S DISEASE WITHOUT GANGRENE: ICD-10-CM

## 2021-06-03 LAB
CCP AB SER IA-ACNC: <0.5 U/ML
FOLATE SERPL-MCNC: 6.8 NG/ML (ref 4–24)
IGA SERPL-MCNC: 160 MG/DL (ref 40–350)
IGG SERPL-MCNC: 802 MG/DL (ref 650–1600)
IGM SERPL-MCNC: 106 MG/DL (ref 50–300)
VIT B12 SERPL-MCNC: >2000 PG/ML (ref 210–950)

## 2021-06-03 PROCEDURE — 82785 ASSAY OF IGE: CPT | Performed by: INTERNAL MEDICINE

## 2021-06-03 PROCEDURE — 82784 ASSAY IGA/IGD/IGG/IGM EACH: CPT | Mod: 59 | Performed by: INTERNAL MEDICINE

## 2021-06-03 PROCEDURE — 81375 HLA II TYPING AG EQUIV LR: CPT | Mod: PO

## 2021-06-03 PROCEDURE — 82746 ASSAY OF FOLIC ACID SERUM: CPT | Performed by: INTERNAL MEDICINE

## 2021-06-03 PROCEDURE — 82787 IGG 1 2 3 OR 4 EACH: CPT | Mod: 59 | Performed by: INTERNAL MEDICINE

## 2021-06-03 PROCEDURE — 86200 CCP ANTIBODY: CPT | Performed by: INTERNAL MEDICINE

## 2021-06-03 PROCEDURE — 82607 VITAMIN B-12: CPT | Performed by: INTERNAL MEDICINE

## 2021-06-03 PROCEDURE — 82784 ASSAY IGA/IGD/IGG/IGM EACH: CPT | Performed by: INTERNAL MEDICINE

## 2021-06-03 PROCEDURE — 84207 ASSAY OF VITAMIN B-6: CPT | Performed by: INTERNAL MEDICINE

## 2021-06-04 LAB — IGE SERPL-ACNC: <35 IU/ML (ref 0–100)

## 2021-06-07 ENCOUNTER — TELEPHONE (OUTPATIENT)
Dept: RHEUMATOLOGY | Facility: CLINIC | Age: 46
End: 2021-06-07

## 2021-06-07 DIAGNOSIS — I73.00 RAYNAUD'S DISEASE WITHOUT GANGRENE: Primary | ICD-10-CM

## 2021-06-07 DIAGNOSIS — G43.011 INTRACTABLE MIGRAINE WITHOUT AURA AND WITH STATUS MIGRAINOSUS: ICD-10-CM

## 2021-06-07 DIAGNOSIS — R76.8 ANA POSITIVE: ICD-10-CM

## 2021-06-07 DIAGNOSIS — R07.89 MUSCULOSKELETAL CHEST PAIN: ICD-10-CM

## 2021-06-07 DIAGNOSIS — M35.1 MCTD (MIXED CONNECTIVE TISSUE DISEASE): ICD-10-CM

## 2021-06-07 LAB
IGG1 SER-MCNC: 469 MG/DL (ref 382–929)
IGG2 SER-MCNC: 163 MG/DL (ref 242–700)
IGG3 SER-MCNC: 23 MG/DL (ref 22–176)
IGG4 SER-MCNC: 3 MG/DL (ref 4–86)

## 2021-06-09 ENCOUNTER — LAB VISIT (OUTPATIENT)
Dept: LAB | Facility: HOSPITAL | Age: 46
End: 2021-06-09
Attending: INTERNAL MEDICINE
Payer: COMMERCIAL

## 2021-06-09 DIAGNOSIS — G43.011 INTRACTABLE MIGRAINE WITHOUT AURA AND WITH STATUS MIGRAINOSUS: ICD-10-CM

## 2021-06-09 DIAGNOSIS — R07.89 MUSCULOSKELETAL CHEST PAIN: ICD-10-CM

## 2021-06-09 DIAGNOSIS — M35.1 MCTD (MIXED CONNECTIVE TISSUE DISEASE): ICD-10-CM

## 2021-06-09 DIAGNOSIS — I73.00 RAYNAUD'S DISEASE WITHOUT GANGRENE: ICD-10-CM

## 2021-06-09 DIAGNOSIS — R76.8 ANA POSITIVE: ICD-10-CM

## 2021-06-09 PROCEDURE — 84207 ASSAY OF VITAMIN B-6: CPT | Performed by: INTERNAL MEDICINE

## 2021-06-09 PROCEDURE — 36415 COLL VENOUS BLD VENIPUNCTURE: CPT | Mod: PO | Performed by: INTERNAL MEDICINE

## 2021-06-15 ENCOUNTER — TELEPHONE (OUTPATIENT)
Dept: RHEUMATOLOGY | Facility: CLINIC | Age: 46
End: 2021-06-15

## 2021-06-15 DIAGNOSIS — R76.8 LOW SERUM IGG2 SUBCLASS LEVEL: ICD-10-CM

## 2021-06-15 DIAGNOSIS — D84.9 IMMUNE DEFICIENCY DISORDER: Primary | ICD-10-CM

## 2021-06-15 DIAGNOSIS — R76.8 LOW SERUM IGG4 SUBCLASS LEVEL: ICD-10-CM

## 2021-06-15 LAB — PYRIDOXAL SERPL-MCNC: 18 UG/L (ref 5–50)

## 2021-06-16 LAB
CELIA INTERPRETATION: NORMAL
CELIA TESTING DATE: NORMAL
HLA DQA1 1: NORMAL
HLA DQA1 2: NORMAL
HLA DRB4 1: NORMAL
HLA-DP 1 SERO. EQUIV: NORMAL
HLA-DP 2 SERO. EQUIV: NORMAL
HLA-DPA1 1: NORMAL
HLA-DPA1 2: NORMAL
HLA-DPB1 1: NORMAL
HLA-DPB1 2: NORMAL
HLA-DQ 1 SERO. EQUIV: 8
HLA-DQ 2 SERO. EQUIV: NORMAL
HLA-DQB1 1: NORMAL
HLA-DQB1 2: NORMAL
HLA-DRB1 1 SERO. EQUIV: NORMAL
HLA-DRB1 1: NORMAL
HLA-DRB1 2 SERO. EQUIV: NORMAL
HLA-DRB1 2: NORMAL
HLA-DRB3 1: NORMAL
HLA-DRB3 2: NORMAL
HLA-DRB345 1 SERO. EQUIV: NORMAL
HLA-DRB345 2 SERO. EQUIV: NORMAL
HLA-DRB4 2: NORMAL
HLA-DRB5 1: NORMAL
HLA-DRB5 2: NORMAL

## 2021-07-06 RX ORDER — ORAL SEMAGLUTIDE 7 MG/1
7 TABLET ORAL DAILY
Qty: 30 TABLET | Refills: 2 | Status: SHIPPED | OUTPATIENT
Start: 2021-07-06 | End: 2021-09-14 | Stop reason: SDUPTHER

## 2021-07-07 DIAGNOSIS — M35.1 MCTD (MIXED CONNECTIVE TISSUE DISEASE): Primary | ICD-10-CM

## 2021-07-07 DIAGNOSIS — R76.8 ANA POSITIVE: ICD-10-CM

## 2021-07-08 ENCOUNTER — OFFICE VISIT (OUTPATIENT)
Dept: ALLERGY | Facility: CLINIC | Age: 46
End: 2021-07-08
Payer: COMMERCIAL

## 2021-07-08 VITALS — HEIGHT: 69 IN | BODY MASS INDEX: 25.11 KG/M2 | WEIGHT: 169.56 LBS

## 2021-07-08 DIAGNOSIS — L50.8 CHRONIC URTICARIA: Primary | ICD-10-CM

## 2021-07-08 DIAGNOSIS — R76.8 LOW SERUM IGG4 SUBCLASS LEVEL: ICD-10-CM

## 2021-07-08 DIAGNOSIS — R76.8 LOW SERUM IGG2 SUBCLASS LEVEL: ICD-10-CM

## 2021-07-08 PROCEDURE — 99244 PR OFFICE CONSULTATION,LEVEL IV: ICD-10-PCS | Mod: S$GLB,,, | Performed by: ALLERGY & IMMUNOLOGY

## 2021-07-08 PROCEDURE — 3008F BODY MASS INDEX DOCD: CPT | Mod: CPTII,S$GLB,, | Performed by: ALLERGY & IMMUNOLOGY

## 2021-07-08 PROCEDURE — 1125F PR PAIN SEVERITY QUANTIFIED, PAIN PRESENT: ICD-10-PCS | Mod: S$GLB,,, | Performed by: ALLERGY & IMMUNOLOGY

## 2021-07-08 PROCEDURE — 99244 OFF/OP CNSLTJ NEW/EST MOD 40: CPT | Mod: S$GLB,,, | Performed by: ALLERGY & IMMUNOLOGY

## 2021-07-08 PROCEDURE — 99999 PR PBB SHADOW E&M-EST. PATIENT-LVL III: ICD-10-PCS | Mod: PBBFAC,,, | Performed by: ALLERGY & IMMUNOLOGY

## 2021-07-08 PROCEDURE — 99999 PR PBB SHADOW E&M-EST. PATIENT-LVL III: CPT | Mod: PBBFAC,,, | Performed by: ALLERGY & IMMUNOLOGY

## 2021-07-08 PROCEDURE — 1125F AMNT PAIN NOTED PAIN PRSNT: CPT | Mod: S$GLB,,, | Performed by: ALLERGY & IMMUNOLOGY

## 2021-07-08 PROCEDURE — 3008F PR BODY MASS INDEX (BMI) DOCUMENTED: ICD-10-PCS | Mod: CPTII,S$GLB,, | Performed by: ALLERGY & IMMUNOLOGY

## 2021-07-08 RX ORDER — HYDROXYCHLOROQUINE SULFATE 200 MG/1
200 TABLET, FILM COATED ORAL 2 TIMES DAILY
Qty: 180 TABLET | Refills: 0 | Status: SHIPPED | OUTPATIENT
Start: 2021-07-08 | End: 2021-09-14 | Stop reason: SDUPTHER

## 2021-07-30 ENCOUNTER — PROCEDURE VISIT (OUTPATIENT)
Dept: NEUROLOGY | Facility: CLINIC | Age: 46
End: 2021-07-30
Payer: COMMERCIAL

## 2021-07-30 VITALS
SYSTOLIC BLOOD PRESSURE: 110 MMHG | HEIGHT: 69 IN | DIASTOLIC BLOOD PRESSURE: 77 MMHG | BODY MASS INDEX: 25.5 KG/M2 | HEART RATE: 95 BPM | TEMPERATURE: 98 F | WEIGHT: 172.19 LBS | RESPIRATION RATE: 17 BRPM

## 2021-07-30 DIAGNOSIS — G43.719 INTRACTABLE CHRONIC MIGRAINE WITHOUT AURA AND WITHOUT STATUS MIGRAINOSUS: Primary | ICD-10-CM

## 2021-07-30 PROCEDURE — 64615 PR CHEMODENERVATION OF MUSCLE FOR CHRONIC MIGRAINE: ICD-10-PCS | Mod: S$GLB,,, | Performed by: PSYCHIATRY & NEUROLOGY

## 2021-07-30 PROCEDURE — 64615 CHEMODENERV MUSC MIGRAINE: CPT | Mod: S$GLB,,, | Performed by: PSYCHIATRY & NEUROLOGY

## 2021-09-01 ENCOUNTER — TELEPHONE (OUTPATIENT)
Dept: RHEUMATOLOGY | Facility: CLINIC | Age: 46
End: 2021-09-01

## 2021-09-14 DIAGNOSIS — M35.1 MCTD (MIXED CONNECTIVE TISSUE DISEASE): ICD-10-CM

## 2021-09-14 DIAGNOSIS — R76.8 ANA POSITIVE: ICD-10-CM

## 2021-09-14 RX ORDER — ORAL SEMAGLUTIDE 7 MG/1
7 TABLET ORAL DAILY
Qty: 90 TABLET | Refills: 3 | Status: SHIPPED | OUTPATIENT
Start: 2021-09-14 | End: 2022-09-20 | Stop reason: ALTCHOICE

## 2021-09-15 RX ORDER — DULOXETIN HYDROCHLORIDE 30 MG/1
30 CAPSULE, DELAYED RELEASE ORAL 2 TIMES DAILY
Qty: 60 CAPSULE | Refills: 5 | Status: SHIPPED | OUTPATIENT
Start: 2021-09-15 | End: 2022-04-07 | Stop reason: SDUPTHER

## 2021-09-15 RX ORDER — HYDROXYCHLOROQUINE SULFATE 200 MG/1
200 TABLET, FILM COATED ORAL 2 TIMES DAILY
Qty: 180 TABLET | Refills: 1 | Status: SHIPPED | OUTPATIENT
Start: 2021-09-15 | End: 2022-04-07 | Stop reason: SDUPTHER

## 2021-09-15 RX ORDER — NAPROXEN SODIUM 550 MG/1
550 TABLET ORAL 2 TIMES DAILY
Qty: 60 TABLET | Refills: 2 | Status: SHIPPED | OUTPATIENT
Start: 2021-09-15 | End: 2023-10-23 | Stop reason: SDUPTHER

## 2021-09-15 RX ORDER — TOPIRAMATE 50 MG/1
50 TABLET, FILM COATED ORAL 2 TIMES DAILY
Qty: 60 TABLET | Refills: 5 | Status: SHIPPED | OUTPATIENT
Start: 2021-09-15 | End: 2022-04-07 | Stop reason: SDUPTHER

## 2021-09-20 ENCOUNTER — TELEPHONE (OUTPATIENT)
Dept: PHARMACY | Facility: CLINIC | Age: 46
End: 2021-09-20

## 2021-09-22 ENCOUNTER — TELEPHONE (OUTPATIENT)
Dept: PHARMACY | Facility: CLINIC | Age: 46
End: 2021-09-22

## 2021-09-28 DIAGNOSIS — G43.711 INTRACTABLE CHRONIC MIGRAINE WITHOUT AURA AND WITH STATUS MIGRAINOSUS: ICD-10-CM

## 2021-09-28 RX ORDER — ZOLMITRIPTAN 5 MG/1
SPRAY NASAL
Qty: 6 EACH | Refills: 5 | Status: SHIPPED | OUTPATIENT
Start: 2021-09-28 | End: 2023-10-23 | Stop reason: SDUPTHER

## 2021-10-08 ENCOUNTER — PROCEDURE VISIT (OUTPATIENT)
Dept: NEUROLOGY | Facility: CLINIC | Age: 46
End: 2021-10-08
Payer: COMMERCIAL

## 2021-10-08 VITALS — WEIGHT: 174.63 LBS | RESPIRATION RATE: 17 BRPM | HEIGHT: 69 IN | BODY MASS INDEX: 25.86 KG/M2 | TEMPERATURE: 98 F

## 2021-10-08 DIAGNOSIS — G43.719 INTRACTABLE CHRONIC MIGRAINE WITHOUT AURA AND WITHOUT STATUS MIGRAINOSUS: Primary | ICD-10-CM

## 2021-10-08 PROCEDURE — 64615 PR CHEMODENERVATION OF MUSCLE FOR CHRONIC MIGRAINE: ICD-10-PCS | Mod: S$GLB,,, | Performed by: PSYCHIATRY & NEUROLOGY

## 2021-10-08 PROCEDURE — 64615 CHEMODENERV MUSC MIGRAINE: CPT | Mod: S$GLB,,, | Performed by: PSYCHIATRY & NEUROLOGY

## 2021-11-10 ENCOUNTER — OFFICE VISIT (OUTPATIENT)
Dept: RHEUMATOLOGY | Facility: CLINIC | Age: 46
End: 2021-11-10
Payer: COMMERCIAL

## 2021-11-10 VITALS
HEART RATE: 74 BPM | SYSTOLIC BLOOD PRESSURE: 126 MMHG | WEIGHT: 178 LBS | HEIGHT: 69 IN | DIASTOLIC BLOOD PRESSURE: 82 MMHG | BODY MASS INDEX: 26.36 KG/M2

## 2021-11-10 DIAGNOSIS — K90.41 NON-CELIAC GLUTEN SENSITIVITY: ICD-10-CM

## 2021-11-10 DIAGNOSIS — D83.9 CVID (COMMON VARIABLE IMMUNODEFICIENCY): Primary | ICD-10-CM

## 2021-11-10 DIAGNOSIS — I73.00 RAYNAUD'S DISEASE WITHOUT GANGRENE: ICD-10-CM

## 2021-11-10 DIAGNOSIS — M35.1 MCTD (MIXED CONNECTIVE TISSUE DISEASE): ICD-10-CM

## 2021-11-10 DIAGNOSIS — R76.8 LOW SERUM IGG4 SUBCLASS LEVEL: ICD-10-CM

## 2021-11-10 DIAGNOSIS — R76.8 LOW SERUM IGG2 SUBCLASS LEVEL: ICD-10-CM

## 2021-11-10 DIAGNOSIS — E06.3 HASHIMOTO'S THYROIDITIS: ICD-10-CM

## 2021-11-10 PROCEDURE — 99999 PR PBB SHADOW E&M-EST. PATIENT-LVL III: ICD-10-PCS | Mod: PBBFAC,,, | Performed by: INTERNAL MEDICINE

## 2021-11-10 PROCEDURE — 99214 OFFICE O/P EST MOD 30 MIN: CPT | Mod: S$GLB,,, | Performed by: INTERNAL MEDICINE

## 2021-11-10 PROCEDURE — 99999 PR PBB SHADOW E&M-EST. PATIENT-LVL III: CPT | Mod: PBBFAC,,, | Performed by: INTERNAL MEDICINE

## 2021-11-10 PROCEDURE — 99214 PR OFFICE/OUTPT VISIT, EST, LEVL IV, 30-39 MIN: ICD-10-PCS | Mod: S$GLB,,, | Performed by: INTERNAL MEDICINE

## 2021-11-10 RX ORDER — THYROID 15 MG/1
15 TABLET ORAL
Qty: 30 TABLET | Refills: 11 | Status: SHIPPED | OUTPATIENT
Start: 2021-11-10 | End: 2022-10-22 | Stop reason: SDUPTHER

## 2021-12-20 ENCOUNTER — PATIENT MESSAGE (OUTPATIENT)
Dept: RHEUMATOLOGY | Facility: CLINIC | Age: 46
End: 2021-12-20
Payer: COMMERCIAL

## 2021-12-22 ENCOUNTER — PROCEDURE VISIT (OUTPATIENT)
Dept: NEUROLOGY | Facility: CLINIC | Age: 46
End: 2021-12-22
Payer: COMMERCIAL

## 2021-12-22 ENCOUNTER — PATIENT MESSAGE (OUTPATIENT)
Dept: RHEUMATOLOGY | Facility: CLINIC | Age: 46
End: 2021-12-22
Payer: COMMERCIAL

## 2021-12-22 VITALS
RESPIRATION RATE: 17 BRPM | HEART RATE: 96 BPM | TEMPERATURE: 99 F | HEIGHT: 69 IN | SYSTOLIC BLOOD PRESSURE: 100 MMHG | WEIGHT: 184.31 LBS | DIASTOLIC BLOOD PRESSURE: 70 MMHG | BODY MASS INDEX: 27.3 KG/M2

## 2021-12-22 DIAGNOSIS — G43.719 INTRACTABLE CHRONIC MIGRAINE WITHOUT AURA AND WITHOUT STATUS MIGRAINOSUS: Primary | ICD-10-CM

## 2021-12-22 PROCEDURE — 64615 PR CHEMODENERVATION OF MUSCLE FOR CHRONIC MIGRAINE: ICD-10-PCS | Mod: S$GLB,,, | Performed by: PSYCHIATRY & NEUROLOGY

## 2021-12-22 PROCEDURE — 64615 CHEMODENERV MUSC MIGRAINE: CPT | Mod: S$GLB,,, | Performed by: PSYCHIATRY & NEUROLOGY

## 2021-12-28 DIAGNOSIS — Z13.9 SCREENING DUE: Primary | ICD-10-CM

## 2022-03-09 ENCOUNTER — PATIENT MESSAGE (OUTPATIENT)
Dept: NEUROLOGY | Facility: CLINIC | Age: 47
End: 2022-03-09

## 2022-03-09 ENCOUNTER — TELEPHONE (OUTPATIENT)
Dept: NEUROLOGY | Facility: CLINIC | Age: 47
End: 2022-03-09
Payer: COMMERCIAL

## 2022-03-09 ENCOUNTER — PROCEDURE VISIT (OUTPATIENT)
Dept: NEUROLOGY | Facility: CLINIC | Age: 47
End: 2022-03-09
Payer: COMMERCIAL

## 2022-03-09 VITALS
DIASTOLIC BLOOD PRESSURE: 69 MMHG | HEART RATE: 89 BPM | BODY MASS INDEX: 28.2 KG/M2 | TEMPERATURE: 98 F | HEIGHT: 69 IN | RESPIRATION RATE: 17 BRPM | SYSTOLIC BLOOD PRESSURE: 110 MMHG | WEIGHT: 190.38 LBS

## 2022-03-09 DIAGNOSIS — G43.719 INTRACTABLE CHRONIC MIGRAINE WITHOUT AURA AND WITHOUT STATUS MIGRAINOSUS: Primary | ICD-10-CM

## 2022-03-09 PROCEDURE — 64615 CHEMODENERV MUSC MIGRAINE: CPT | Mod: S$GLB,,, | Performed by: PSYCHIATRY & NEUROLOGY

## 2022-03-09 PROCEDURE — 64615 PR CHEMODENERVATION OF MUSCLE FOR CHRONIC MIGRAINE: ICD-10-PCS | Mod: S$GLB,,, | Performed by: PSYCHIATRY & NEUROLOGY

## 2022-03-09 NOTE — PROCEDURES
Procedures  BOTOX PROCEDURE    PROCEDURE PERFORMED: Botulinum toxin injection (51120)    CLINICAL INDICATION: G43.719    A time out was conducted just before the start of the procedure to verify the correct patient and procedure, procedure location, and all relevant critical information.   The patient meets criteria for chronic headaches according to the ICHD-II, the patient has more than 15 headaches a month out of at least 8 are migraines which last for more than 4 hours a day.    The Botox injections have achieved well over 50%  improvement in the patient's symptoms. Migraines have been reduced at least 7 days per month and the number of cumulative hours suffering with headaches has been reduced at least 100 total hours per month. Today she does have a headache indicating that the Botox has worn off. Frequency of treatment is every 3 months unless no response to the treatments, at which time we will discontinue the injections.      DESCRIPTION OF PROCEDURE: After obtaining informed consent and under aseptic technique, a total of 185 units of botulinum toxin type A were injected in the following muscles: Procerus 5 units,  5 units bilaterally, frontalis 20 units, temporalis 20 units bilaterally, Masseters 15 units bilaterally (3 sites, 5 units per site), occipitalis 15 units, upper cervical paraspinals 10 units bilaterally and trapezius 15 units bilaterally. The patient was given a total of 185 units in 37 sites.The patient tolerated the procedure well. There were no complications. The patient was given a prescription for repeat treatment in 3 months.     Unavoidable waste 15 units      Mitchell Hill M.D  Medical Director, Headache and Facial Pain  [facility]

## 2022-03-09 NOTE — TELEPHONE ENCOUNTER
No need to contact patient. She is being seen today. Call room sent message before contacting staff for ok to be seen.

## 2022-03-09 NOTE — TELEPHONE ENCOUNTER
----- Message from Richmond uHmphrey sent at 3/9/2022  8:11 AM CST -----  Regarding: reschedule botox  Type:  Sooner Apoointment Request    Caller is requesting a sooner appointment.      Name of Caller:  Loren    When is the first available appointment?  Dept book    Symptoms:  botox // headaches    Best Call Back Number:  [phone]     Additional Information:  pt can take anytime of day.

## 2022-03-09 NOTE — TELEPHONE ENCOUNTER
----- Message from David Oh sent at 3/9/2022  7:53 AM CST -----  Type: Needs Medical Advice  Who Called: Patient  Symptoms (please be specific):   How long has patient had these symptoms:    Pharmacy name and phone #:    Best Call Back Number: [phone]  Additional Information: Pt requesting a call back concerning today's appt for 8:00, pt states she is in traffic and maybe 30mins late.

## 2022-03-10 ENCOUNTER — PATIENT MESSAGE (OUTPATIENT)
Dept: RHEUMATOLOGY | Facility: CLINIC | Age: 47
End: 2022-03-10
Payer: COMMERCIAL

## 2022-04-05 ENCOUNTER — OFFICE VISIT (OUTPATIENT)
Dept: ENDOCRINOLOGY | Facility: CLINIC | Age: 47
End: 2022-04-05
Payer: COMMERCIAL

## 2022-04-05 DIAGNOSIS — N95.1 PERIMENOPAUSE: ICD-10-CM

## 2022-04-05 DIAGNOSIS — G43.709 CHRONIC MIGRAINE WITHOUT AURA WITHOUT STATUS MIGRAINOSUS, NOT INTRACTABLE: ICD-10-CM

## 2022-04-05 DIAGNOSIS — E88.810 DYSMETABOLIC SYNDROME: Primary | ICD-10-CM

## 2022-04-05 DIAGNOSIS — R94.6 THYROID FUNCTION TEST ABNORMAL: ICD-10-CM

## 2022-04-05 DIAGNOSIS — R73.09 DYSGLYCEMIA: ICD-10-CM

## 2022-04-05 DIAGNOSIS — E55.9 HYPOVITAMINOSIS D: ICD-10-CM

## 2022-04-05 DIAGNOSIS — E78.5 DYSLIPIDEMIA: ICD-10-CM

## 2022-04-05 DIAGNOSIS — F41.1 ANXIETY, GENERALIZED: ICD-10-CM

## 2022-04-05 DIAGNOSIS — Z87.42 HISTORY OF ENDOMETRIOSIS: ICD-10-CM

## 2022-04-05 DIAGNOSIS — R79.89 HYPOTHYROXINEMIA: ICD-10-CM

## 2022-04-05 DIAGNOSIS — Z87.442 HISTORY OF NEPHROLITHIASIS: ICD-10-CM

## 2022-04-05 DIAGNOSIS — R63.5 WEIGHT GAIN: ICD-10-CM

## 2022-04-05 PROCEDURE — 1160F RVW MEDS BY RX/DR IN RCRD: CPT | Mod: CPTII,95,, | Performed by: INTERNAL MEDICINE

## 2022-04-05 PROCEDURE — 99214 OFFICE O/P EST MOD 30 MIN: CPT | Mod: 95,,, | Performed by: INTERNAL MEDICINE

## 2022-04-05 PROCEDURE — 1159F PR MEDICATION LIST DOCUMENTED IN MEDICAL RECORD: ICD-10-PCS | Mod: CPTII,95,, | Performed by: INTERNAL MEDICINE

## 2022-04-05 PROCEDURE — 99214 PR OFFICE/OUTPT VISIT, EST, LEVL IV, 30-39 MIN: ICD-10-PCS | Mod: 95,,, | Performed by: INTERNAL MEDICINE

## 2022-04-05 PROCEDURE — 1159F MED LIST DOCD IN RCRD: CPT | Mod: CPTII,95,, | Performed by: INTERNAL MEDICINE

## 2022-04-05 PROCEDURE — 1160F PR REVIEW ALL MEDS BY PRESCRIBER/CLIN PHARMACIST DOCUMENTED: ICD-10-PCS | Mod: CPTII,95,, | Performed by: INTERNAL MEDICINE

## 2022-04-05 RX ORDER — ORAL SEMAGLUTIDE 7 MG/1
7 TABLET ORAL DAILY
Qty: 90 TABLET | Refills: 3 | Status: SHIPPED | OUTPATIENT
Start: 2022-04-05 | End: 2022-07-05

## 2022-04-05 NOTE — PROGRESS NOTES
Subjective:      Patient ID: Loren Lemus is a 46 y.o. female.    Chief Complaint:      Patient is a 45 yr old lady seen in [facility] up on account of weight gain and ongoing voluntary weight loss efforts with Rybelsus use. This is a video televisit and thus the examination aspects of the visit are limited.    History of Present Illness    The patient, Ms Lemus is a 45 yr old lady seen in [facility] today on account of voluntary weight loss due to prior weight gain using Rybelsus and Topiramate. She is presently on Topamax 50mg BID and Rybelsus 7mg Qd. This is a video televisit.  The patient location is: home  The chief complaint leading to consultation is: chronic weight management    Visit type: Synchronous audio and video televisit    Face to Face time with patient: ~ 30 minutes of total time spent on the encounter, which includes face to face time and non-face to face time preparing to see the patient (eg, review of tests), Obtaining and/or reviewing separately obtained history, Documenting clinical information in the electronic or other health record, Independently interpreting results (not separately reported) and communicating results to the patient/family/caregiver, or Care coordination (not separately reported).         Each patient to whom he or she provides medical services by telemedicine is:  (1) informed of the relationship between the physician and patient and the respective role of any other health care provider with respect to management of the patient; and (2) notified that he or she may decline to receive medical services by telemedicine and may withdraw from such care at any time.    Notes:     Her background comorbidities are as follows;    1. Weight gain     2. Migraine with aura and without status migrainosus, not intractable     3. Dysglycemia     4. Dysmetabolic syndrome     5. History of endometriosis     6. History of nephrolithiasis       She is s/p STEPHEN ~ 2016 but has ovaries insitu.  She does  have intermittent hot flashes presently.  She started Rybelsus ~ 07/2020. She has lost ~ 77lbs since commencing same.  She has has intermittent episodes of abdominal discomfort without vomiting.   Patients daughter does have thyroid dysfunction and thyroid nodules.    Patient does not smoke.  Patient drinks very infrequently.   Grav 4 Para 3+1 ( 3 alive).  She has no fresh complaints today.  Patient regained some weight over the winter period when she was of the Rybelsus for ~ 6mths.  She has no present vasomotor problems and since starting low dose armor thyroid with Dr Rasmussen she feels much better and has noticed improved scalp hair growth.      Review of Systems   Constitutional: Positive for diaphoresis (intermittent; mainly nocturnal and associated with flushing). Negative for activity change, appetite change, fatigue and unexpected weight change.   HENT: Negative for facial swelling, sore throat, trouble swallowing and voice change.    Eyes: Negative for photophobia and visual disturbance.   Respiratory: Negative for cough and shortness of breath.    Cardiovascular: Negative for chest pain, palpitations and leg swelling.   Gastrointestinal: Positive for nausea (intermittent). Negative for abdominal distention, abdominal pain, constipation and diarrhea.   Endocrine: Negative for cold intolerance, heat intolerance, polydipsia, polyphagia and polyuria.   Genitourinary: Negative for difficulty urinating, dysuria, flank pain, frequency, hematuria, menstrual problem (S/p STEPHEN for endometriosis) and urgency.   Musculoskeletal: Negative for arthralgias, back pain, gait problem, neck pain and neck stiffness.   Skin: Negative for color change, pallor and rash.   Neurological: Positive for headaches (Chronic migrainous headaches on Rx.). Negative for dizziness, tremors, seizures, syncope, weakness, light-headedness and numbness.   Hematological: Does not bruise/bleed easily.   Psychiatric/Behavioral: Negative for  agitation, confusion, dysphoric mood, hallucinations and sleep disturbance. The patient is not nervous/anxious.        Objective: LMP 11/19/2016  Nil vitals; video televisit.         Lab Review:      Latest Reference Range & Units 06/03/21 10:28 06/09/21 09:00   Folate 4.0 - 24.0 ng/mL 6.8    Vitamin B-12 210 - 950 pg/mL >2000 (H)    Vitamin B6 5 - 50 ug/L  18 [1]   HLA DQA1 1  *03    HLA DQA1 2  XX    IgG 650 - 1600 mg/dL 802 [2]    IgM 50 - 300 mg/dL 106 [3]    IgA 40 - 350 mg/dL 160 [4]    IgE 0 - 100 IU/mL <35    IgG 1 382 - 929 mg/dL 469    IgG 2 242 - 700 mg/dL 163 (L)    IgG 3 22 - 176 mg/dL 23    IgG 4 4 - 86 mg/dL 3 (L) [5]    CCP Antibodies <5.0 U/mL <0.5    HLA DRB4 1  NT    HLA-DP 1 Sero. Equiv  NT    HLA-DP 2 Sero. Equiv  NT [6]    HLA-DRB1 1  NT    HLA-DRB1 2  NT    HLA-DRB3 1 Hi Res  NT    HLA-DRB3 2  NT    HLA-DRB4 2  NT    HLA-DRB5 2  NT    HLA-DRB5 1  NT    HLA-DPA1 1  NT    HLA-DPA1 2  NT    HLA-DPB1 2  NT    HLA-DPB1 1  NT    HLA-DQB1 2  XX    HLA-DQB1 1  *03    HLA-IKL206 1 Sero. Equiv  NT    HLA-JDN234 2 Sero. Equiv  NT    HLA-DQ 1 Sero. Equiv  8    HLA-DQ 2 Sero. Equiv  XX    HLA-DRB1 1 Sero. Equiv  NT    HLA-DRB1 2 Sero. Equiv  NT    (H): Data is abnormally high  (L): Data is abnormally low  [1] This test was developed and the performance    characteristics determined by [facility].    It has not been cleared or approved by the FDA.    The laboratory is regulated under CLIA as qualified to    perform high-complexity testing. This test is used for    patient testing purposes. It should not be regarded    as investigational or for research.      Test performed at [facility],   [address] , [place]  [address] [phone]   Paul Lan MD  - Medical Director     [2] IgG Cord Blood Reference Range: 650-1600 mg/dL.  [3] IgM Cord Blood Reference Range: <25 mg/dL.  [4] IgA Cord Blood Reference Range: <5 mg/dL.  [5] Test performed at [facility],   [address] , [place]  [address]     [phone]   Paul Lan MD  - Medical Director     [6] These tests are not cleared or approved by the U.S. FDA, but such    approval is not required since this laboratory is certified by CLIA    (#[id number]) and the American Society for Histocompatibility and    Immunogenetics (09-8-IU-02-01) to perform high complexity testing.     [facility]    Laboratory is under the direction of JANNETH Cohen MD, PANCHITO.    Details of test procedures may be obtained by calling the Laboratory    at  [phone].   HLA typing was performed by [facility]SO (immunofluorescent detection) and/or    PCR amplification SSP/RT-PCR molecular techniques. These tests have    been developed by [employer] (a division of [facility])    and/or [employer]( A division of [facility]).  All procedures and reagents    have been validated and performance characteristics determined, by    [facility].     Documentation available upon request.      Assessment:     1. Dysmetabolic syndrome  Comprehensive Metabolic Panel    Lipid Panel    Urinalysis    Microalbumin/Creatinine Ratio, Urine    Amylase    Lipase    Calcitonin   2. Chronic migraine without aura without status migrainosus, not intractable     3. Anxiety, generalized     4. History of endometriosis     5. History of nephrolithiasis  Comprehensive Metabolic Panel   6. Perimenopause     7. Hypovitaminosis D  Calcium, Ionized    PTH, Intact    Vitamin D   8. Weight gain  CBC Auto Differential   9. Thyroid function test abnormal  T4, Free    T3    Thyroglobulin    TSH   10. Hypothyroxinemia  CBC Auto Differential    Uric Acid    T4, Free    T3    Thyroglobulin    TSH   11. Dyslipidemia  Uric Acid   12. Dysglycemia  Hemoglobin A1C       Regarding weight gain; she has had a salutary response to Rybelsus. To continue same for now @ dose of  7mg Qd. To check relevant labs as detailed above.  Regarding dysmetabolic syndrome screening; labs as detailed above.  Regarding history of nephrolithiasis; to continue ensuring copious free water intake.  Regarding possible perimenopausal status; to obtain screening labs as detailed above.  Regarding possible hypovitaminosis D; to check 25 OH Vit D levels and replete as needed.  Regarding chronic migraines; presently well controlled on present multi-agent regimen including topiramate, Beta blockade,  Botox injections and Zolmig prn.  Regarding resting tachycardia; stable, asymptomatic. To continue low dose beta blockade as before.        Plan:       [facility] in ~ 6mths.

## 2022-04-07 DIAGNOSIS — R76.8 ANA POSITIVE: ICD-10-CM

## 2022-04-07 DIAGNOSIS — M35.1 MCTD (MIXED CONNECTIVE TISSUE DISEASE): ICD-10-CM

## 2022-04-07 RX ORDER — TOPIRAMATE 50 MG/1
50 TABLET, FILM COATED ORAL 2 TIMES DAILY
Qty: 60 TABLET | Refills: 5 | Status: SHIPPED | OUTPATIENT
Start: 2022-04-07 | End: 2023-05-31 | Stop reason: SDUPTHER

## 2022-04-07 RX ORDER — HYDROXYCHLOROQUINE SULFATE 200 MG/1
200 TABLET, FILM COATED ORAL 2 TIMES DAILY
Qty: 180 TABLET | Refills: 1 | Status: SHIPPED | OUTPATIENT
Start: 2022-04-07 | End: 2022-10-20 | Stop reason: SDUPTHER

## 2022-04-07 RX ORDER — DULOXETIN HYDROCHLORIDE 30 MG/1
30 CAPSULE, DELAYED RELEASE ORAL 2 TIMES DAILY
Qty: 60 CAPSULE | Refills: 5 | Status: SHIPPED | OUTPATIENT
Start: 2022-04-07 | End: 2023-05-31 | Stop reason: SDUPTHER

## 2022-05-11 ENCOUNTER — PATIENT MESSAGE (OUTPATIENT)
Dept: RHEUMATOLOGY | Facility: CLINIC | Age: 47
End: 2022-05-11
Payer: COMMERCIAL

## 2022-05-11 DIAGNOSIS — M35.1 MCTD (MIXED CONNECTIVE TISSUE DISEASE): Primary | ICD-10-CM

## 2022-05-11 RX ORDER — PREGABALIN 25 MG/1
25 CAPSULE ORAL 2 TIMES DAILY
Qty: 60 CAPSULE | Refills: 3 | Status: SHIPPED | OUTPATIENT
Start: 2022-05-11 | End: 2023-05-31 | Stop reason: SDUPTHER

## 2022-05-12 ENCOUNTER — PATIENT MESSAGE (OUTPATIENT)
Dept: RHEUMATOLOGY | Facility: CLINIC | Age: 47
End: 2022-05-12
Payer: COMMERCIAL

## 2022-05-18 ENCOUNTER — OFFICE VISIT (OUTPATIENT)
Dept: NEUROLOGY | Facility: CLINIC | Age: 47
End: 2022-05-18
Payer: COMMERCIAL

## 2022-05-18 VITALS
HEART RATE: 97 BPM | DIASTOLIC BLOOD PRESSURE: 83 MMHG | SYSTOLIC BLOOD PRESSURE: 123 MMHG | RESPIRATION RATE: 17 BRPM | BODY MASS INDEX: 28.14 KG/M2 | HEIGHT: 69 IN | WEIGHT: 190 LBS

## 2022-05-18 DIAGNOSIS — R00.2 PALPITATIONS: ICD-10-CM

## 2022-05-18 DIAGNOSIS — R00.0 TACHYCARDIA, UNSPECIFIED: ICD-10-CM

## 2022-05-18 DIAGNOSIS — N95.1 PERIMENOPAUSE: ICD-10-CM

## 2022-05-18 DIAGNOSIS — R07.89 MUSCULOSKELETAL CHEST PAIN: Primary | ICD-10-CM

## 2022-05-18 DIAGNOSIS — R63.5 WEIGHT GAIN: ICD-10-CM

## 2022-05-18 DIAGNOSIS — G43.711 INTRACTABLE CHRONIC MIGRAINE WITHOUT AURA AND WITH STATUS MIGRAINOSUS: ICD-10-CM

## 2022-05-18 PROCEDURE — 3079F DIAST BP 80-89 MM HG: CPT | Mod: CPTII,S$GLB,, | Performed by: PSYCHIATRY & NEUROLOGY

## 2022-05-18 PROCEDURE — 3008F PR BODY MASS INDEX (BMI) DOCUMENTED: ICD-10-PCS | Mod: CPTII,S$GLB,, | Performed by: PSYCHIATRY & NEUROLOGY

## 2022-05-18 PROCEDURE — 3079F PR MOST RECENT DIASTOLIC BLOOD PRESSURE 80-89 MM HG: ICD-10-PCS | Mod: CPTII,S$GLB,, | Performed by: PSYCHIATRY & NEUROLOGY

## 2022-05-18 PROCEDURE — 3074F SYST BP LT 130 MM HG: CPT | Mod: CPTII,S$GLB,, | Performed by: PSYCHIATRY & NEUROLOGY

## 2022-05-18 PROCEDURE — 3074F PR MOST RECENT SYSTOLIC BLOOD PRESSURE < 130 MM HG: ICD-10-PCS | Mod: CPTII,S$GLB,, | Performed by: PSYCHIATRY & NEUROLOGY

## 2022-05-18 PROCEDURE — 99214 OFFICE O/P EST MOD 30 MIN: CPT | Mod: S$GLB,,, | Performed by: PSYCHIATRY & NEUROLOGY

## 2022-05-18 PROCEDURE — 99999 PR PBB SHADOW E&M-EST. PATIENT-LVL IV: ICD-10-PCS | Mod: PBBFAC,,, | Performed by: PSYCHIATRY & NEUROLOGY

## 2022-05-18 PROCEDURE — 1159F MED LIST DOCD IN RCRD: CPT | Mod: CPTII,S$GLB,, | Performed by: PSYCHIATRY & NEUROLOGY

## 2022-05-18 PROCEDURE — 99999 PR PBB SHADOW E&M-EST. PATIENT-LVL IV: CPT | Mod: PBBFAC,,, | Performed by: PSYCHIATRY & NEUROLOGY

## 2022-05-18 PROCEDURE — 1159F PR MEDICATION LIST DOCUMENTED IN MEDICAL RECORD: ICD-10-PCS | Mod: CPTII,S$GLB,, | Performed by: PSYCHIATRY & NEUROLOGY

## 2022-05-18 PROCEDURE — 3008F BODY MASS INDEX DOCD: CPT | Mod: CPTII,S$GLB,, | Performed by: PSYCHIATRY & NEUROLOGY

## 2022-05-18 PROCEDURE — 99214 PR OFFICE/OUTPT VISIT, EST, LEVL IV, 30-39 MIN: ICD-10-PCS | Mod: S$GLB,,, | Performed by: PSYCHIATRY & NEUROLOGY

## 2022-05-18 NOTE — ASSESSMENT & PLAN NOTE
-Botox scheduled for next week   -Continue current headache preventive regimen along with the botox: topamax 50 mg BID and cymbalta 30 mg BID   - Recently started on lyrica for her RA and lupus which can also help with headaches
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
## 2022-05-25 ENCOUNTER — PATIENT MESSAGE (OUTPATIENT)
Dept: NEUROLOGY | Facility: CLINIC | Age: 47
End: 2022-05-25
Payer: COMMERCIAL

## 2022-06-08 ENCOUNTER — OFFICE VISIT (OUTPATIENT)
Dept: RHEUMATOLOGY | Facility: CLINIC | Age: 47
End: 2022-06-08
Payer: COMMERCIAL

## 2022-06-08 VITALS
DIASTOLIC BLOOD PRESSURE: 90 MMHG | WEIGHT: 193.13 LBS | SYSTOLIC BLOOD PRESSURE: 142 MMHG | HEART RATE: 71 BPM | HEIGHT: 69 IN | BODY MASS INDEX: 28.6 KG/M2

## 2022-06-08 DIAGNOSIS — D84.9 IMMUNE DEFICIENCY DISORDER: ICD-10-CM

## 2022-06-08 DIAGNOSIS — G43.119 INTRACTABLE MIGRAINE WITH AURA WITHOUT STATUS MIGRAINOSUS: ICD-10-CM

## 2022-06-08 DIAGNOSIS — K90.41 NON-CELIAC GLUTEN SENSITIVITY: ICD-10-CM

## 2022-06-08 DIAGNOSIS — R76.8 LOW SERUM IGG2 SUBCLASS LEVEL: ICD-10-CM

## 2022-06-08 DIAGNOSIS — R76.8 LOW SERUM IGG4 SUBCLASS LEVEL: ICD-10-CM

## 2022-06-08 DIAGNOSIS — M25.571 TOE JOINT PAIN, RIGHT: ICD-10-CM

## 2022-06-08 DIAGNOSIS — M35.1 MCTD (MIXED CONNECTIVE TISSUE DISEASE): Primary | ICD-10-CM

## 2022-06-08 DIAGNOSIS — D83.9 CVID (COMMON VARIABLE IMMUNODEFICIENCY): ICD-10-CM

## 2022-06-08 PROCEDURE — 3080F DIAST BP >= 90 MM HG: CPT | Mod: CPTII,S$GLB,, | Performed by: INTERNAL MEDICINE

## 2022-06-08 PROCEDURE — 3044F HG A1C LEVEL LT 7.0%: CPT | Mod: CPTII,S$GLB,, | Performed by: INTERNAL MEDICINE

## 2022-06-08 PROCEDURE — 3008F PR BODY MASS INDEX (BMI) DOCUMENTED: ICD-10-PCS | Mod: CPTII,S$GLB,, | Performed by: INTERNAL MEDICINE

## 2022-06-08 PROCEDURE — 99214 OFFICE O/P EST MOD 30 MIN: CPT | Mod: 25,S$GLB,, | Performed by: INTERNAL MEDICINE

## 2022-06-08 PROCEDURE — 99999 PR PBB SHADOW E&M-EST. PATIENT-LVL IV: ICD-10-PCS | Mod: PBBFAC,,, | Performed by: INTERNAL MEDICINE

## 2022-06-08 PROCEDURE — 3044F PR MOST RECENT HEMOGLOBIN A1C LEVEL <7.0%: ICD-10-PCS | Mod: CPTII,S$GLB,, | Performed by: INTERNAL MEDICINE

## 2022-06-08 PROCEDURE — 3008F BODY MASS INDEX DOCD: CPT | Mod: CPTII,S$GLB,, | Performed by: INTERNAL MEDICINE

## 2022-06-08 PROCEDURE — 96372 PR INJECTION,THERAP/PROPH/DIAG2ST, IM OR SUBCUT: ICD-10-PCS | Mod: S$GLB,,, | Performed by: INTERNAL MEDICINE

## 2022-06-08 PROCEDURE — 3080F PR MOST RECENT DIASTOLIC BLOOD PRESSURE >= 90 MM HG: ICD-10-PCS | Mod: CPTII,S$GLB,, | Performed by: INTERNAL MEDICINE

## 2022-06-08 PROCEDURE — 99214 PR OFFICE/OUTPT VISIT, EST, LEVL IV, 30-39 MIN: ICD-10-PCS | Mod: 25,S$GLB,, | Performed by: INTERNAL MEDICINE

## 2022-06-08 PROCEDURE — 3077F PR MOST RECENT SYSTOLIC BLOOD PRESSURE >= 140 MM HG: ICD-10-PCS | Mod: CPTII,S$GLB,, | Performed by: INTERNAL MEDICINE

## 2022-06-08 PROCEDURE — 96372 THER/PROPH/DIAG INJ SC/IM: CPT | Mod: S$GLB,,, | Performed by: INTERNAL MEDICINE

## 2022-06-08 PROCEDURE — 3077F SYST BP >= 140 MM HG: CPT | Mod: CPTII,S$GLB,, | Performed by: INTERNAL MEDICINE

## 2022-06-08 PROCEDURE — 99999 PR PBB SHADOW E&M-EST. PATIENT-LVL IV: CPT | Mod: PBBFAC,,, | Performed by: INTERNAL MEDICINE

## 2022-06-08 RX ORDER — NEEDLES, DISPOSABLE 25GX5/8"
1 NEEDLE, DISPOSABLE MISCELLANEOUS WEEKLY
Qty: 25 EACH | Refills: 3 | Status: SHIPPED | OUTPATIENT
Start: 2022-06-08

## 2022-06-08 RX ORDER — KETOROLAC TROMETHAMINE 30 MG/ML
60 INJECTION, SOLUTION INTRAMUSCULAR; INTRAVENOUS
Status: COMPLETED | OUTPATIENT
Start: 2022-06-08 | End: 2022-06-08

## 2022-06-08 RX ORDER — KETOROLAC TROMETHAMINE 30 MG/ML
60 INJECTION, SOLUTION INTRAMUSCULAR; INTRAVENOUS
Qty: 2 ML | Refills: 3 | Status: SHIPPED | OUTPATIENT
Start: 2022-06-08 | End: 2022-08-30 | Stop reason: SDUPTHER

## 2022-06-08 RX ORDER — METHYLPREDNISOLONE ACETATE 80 MG/ML
80 INJECTION, SUSPENSION INTRA-ARTICULAR; INTRALESIONAL; INTRAMUSCULAR; SOFT TISSUE
Status: COMPLETED | OUTPATIENT
Start: 2022-06-08 | End: 2022-06-08

## 2022-06-08 RX ORDER — PREDNISONE 2.5 MG/1
7.5 TABLET ORAL DAILY
Qty: 90 TABLET | Refills: 11 | Status: SHIPPED | OUTPATIENT
Start: 2022-06-08 | End: 2023-06-08

## 2022-06-08 RX ADMIN — KETOROLAC TROMETHAMINE 60 MG: 30 INJECTION, SOLUTION INTRAMUSCULAR; INTRAVENOUS at 05:06

## 2022-06-08 RX ADMIN — METHYLPREDNISOLONE ACETATE 80 MG: 80 INJECTION, SUSPENSION INTRA-ARTICULAR; INTRALESIONAL; INTRAMUSCULAR; SOFT TISSUE at 05:06

## 2022-06-08 NOTE — PROGRESS NOTES
Subjective:       Patient ID: Loren Lemus is a 46 y.o. female.    Chief Complaint: Disease Management    Follow up: 44 yo female with CVID, non celiac gluten hypersenitivity, hashimoto's thyroiditis, MCDT  On plaquenil. H/o migraine Patient complains of arthralgias and myalgias for which has been present for a few years. Pain is located in multiple joints, both shoulder(s), both elbow(s), both wrist(s), both MCP(s): 1st, 2nd, 3rd, 4th and 5th, both PIP(s): 1st, 2nd, 3rd, 4th and 5th, both DIP(s): 1st and 2nd, both hip(s), both knee(s) and both MTP(s): 1st, 2nd, 3rd, 4th and 5th, is described as aching, pulsating, shooting and throbbing, and is constant, moderate .  Associated symptoms include: crepitation, decreased range of motion, edema, effusion, tenderness and warmth.              She reports no joint swelling. Pertinent negatives include no dysuria, fatigue, fever, trouble swallowing, myalgias or headaches.         Review of Systems   Constitutional: Negative for activity change, appetite change, chills, diaphoresis, fatigue, fever and unexpected weight change.   HENT: Negative for congestion, dental problem, ear discharge, ear pain, facial swelling, mouth sores, nosebleeds, postnasal drip, rhinorrhea, sinus pressure, sneezing, sore throat, tinnitus, trouble swallowing and voice change.    Eyes: Negative for photophobia, pain, discharge, redness and itching.   Respiratory: Negative for apnea, cough, chest tightness, shortness of breath and wheezing.    Cardiovascular: Negative for chest pain, palpitations and leg swelling.   Gastrointestinal: Negative for abdominal distention, abdominal pain, constipation, diarrhea, nausea and vomiting.   Endocrine: Negative for cold intolerance, heat intolerance, polydipsia and polyuria.   Genitourinary: Negative for decreased urine volume, difficulty urinating, dysuria, flank pain, frequency, genital sores, hematuria and urgency.   Musculoskeletal: Negative for  "arthralgias, back pain, gait problem, joint swelling, myalgias, neck pain and neck stiffness.   Skin: Negative for pallor, rash and wound.   Allergic/Immunologic: Negative for immunocompromised state.   Neurological: Negative for dizziness, tremors, weakness, numbness and headaches.   Hematological: Negative for adenopathy. Does not bruise/bleed easily.   Psychiatric/Behavioral: Negative for sleep disturbance. The patient is not nervous/anxious.          Objective:   BP (!) 142/90   Pulse 71   Ht 5' 9.02" (1.753 m)   Wt 87.6 kg (193 lb 2 oz)   LMP 11/19/2016   BMI 28.51 kg/m²      Physical Exam   Constitutional: She is oriented to person, place, and time.   HENT:   Head: Normocephalic and atraumatic.   Mouth/Throat: Oropharynx is clear and moist.   Eyes: Pupils are equal, round, and reactive to light.   Neck: No thyromegaly present.   Cardiovascular: Normal rate, regular rhythm and normal heart sounds. Exam reveals no gallop and no friction rub.   No murmur heard.  Pulmonary/Chest: Breath sounds normal. She has no wheezes. She has no rales. She exhibits no tenderness.   Abdominal: There is no abdominal tenderness. There is no rebound and no guarding.   Musculoskeletal:      Right shoulder: Tenderness present.      Left shoulder: Tenderness present.      Right elbow: Normal.      Left elbow: Tenderness present.      Right wrist: Tenderness present.      Left wrist: Tenderness present.      Cervical back: Neck supple.      Right knee: Effusion present. Tenderness present.      Left knee: Effusion present. Tenderness present.   Lymphadenopathy:     She has no cervical adenopathy.   Neurological: She is alert and oriented to person, place, and time. Gait normal.   Skin: Rash noted. There is erythema. There is pallor.   Psychiatric: Mood, memory, affect and judgment normal.   Vitals reviewed.      Right Side Rheumatological Exam     Examination finds the elbow normal.    The patient is tender to palpation of the " shoulder, wrist, knee, 1st PIP, 1st MCP, 2nd PIP, 2nd MCP, 3rd PIP, 3rd MCP, 4th PIP, 4th MCP, 5th PIP and 5th MCP    Shoulder Exam   Tenderness Location: acromion    Range of Motion   Active abduction: abnormal   Adduction: abnormal  Sensation: normal    Knee Exam   Tenderness Location: medial joint line and LCL  Patellofemoral Crepitus: positive  Effusion: positive  Sensation: normal    Hip Exam   Tenderness Location: posterior and lateral  Sensation: normal    Elbow/Wrist Exam   Tenderness Location: no tenderness  Sensation: normal    Left Side Rheumatological Exam     The patient is tender to palpation of the shoulder, elbow, wrist, knee, 1st PIP, 1st MCP, 2nd PIP, 2nd MCP, 3rd PIP, 3rd MCP, 4th PIP, 4th MCP, 5th PIP and 5th MCP.    Shoulder Exam   Tenderness Location: acromion    Range of Motion   Active abduction: abnormal   Sensation: normal    Knee Exam   Tenderness Location: lateral joint line and medial joint line    Patellofemoral Crepitus: positive  Effusion: positive  Sensation: normal    Hip Exam   Tenderness Location: posterior and lateral  Sensation: normal    Elbow/Wrist Exam   Sensation: normal      Back/Neck Exam   Tenderness Right paramedian tenderness of the Upper C-Spine, Upper T-Spine, Upper L-Spine and SI Joint.Left paramedian tenderness of the Upper C-Spine, Upper T-Spine, SI Joint and Upper L-Spine.           Results for orders placed or performed in visit on 06/09/21   VITAMIN B6   Result Value Ref Range    Vitamin B6 18 5 - 50 ug/L         Assessment:       1. MCTD (mixed connective tissue disease)    2. CVID (common variable immunodeficiency)    3. Low serum IgG2 subclass level    4. Low serum IgG4 subclass level    5. Non-celiac gluten sensitivity    6. Immune deficiency disorder    7. Intractable migraine with aura without status migrainosus    8. Toe joint pain, right            Plan:       Loren was seen today for disease management.    Diagnoses and all orders for this  "visit:    MCTD (mixed connective tissue disease)  -     predniSONE (DELTASONE) 2.5 MG tablet; Take 3 tablets (7.5 mg total) by mouth once daily.  -     ketorolac injection 60 mg  -     methylPREDNISolone acetate injection 80 mg  -     ketorolac (TORADOL) 30 mg/mL (1 mL) injection; Inject 2 mLs (60 mg total) into the vein every 30 days.  -     blunt needle, disposable 18 x 1 1/2 " Ndle; 1 Syringe by Misc.(Non-Drug; Combo Route) route once a week.  -     needle, disp, 25 gauge 25 gauge x 1 1/2" Ndle; 1 each by Misc.(Non-Drug; Combo Route) route every 7 days.  -     ESTEBAN Screen w/Reflex; Future  -     Sedimentation rate; Future  -     C-Reactive Protein; Future    CVID (common variable immunodeficiency)  -     predniSONE (DELTASONE) 2.5 MG tablet; Take 3 tablets (7.5 mg total) by mouth once daily.  -     ketorolac injection 60 mg  -     methylPREDNISolone acetate injection 80 mg  -     ketorolac (TORADOL) 30 mg/mL (1 mL) injection; Inject 2 mLs (60 mg total) into the vein every 30 days.  -     blunt needle, disposable 18 x 1 1/2 " Ndle; 1 Syringe by Misc.(Non-Drug; Combo Route) route once a week.  -     needle, disp, 25 gauge 25 gauge x 1 1/2" Ndle; 1 each by Misc.(Non-Drug; Combo Route) route every 7 days.  -     ESTEBAN Screen w/Reflex; Future  -     Sedimentation rate; Future  -     C-Reactive Protein; Future  -     Ambulatory referral/consult to Podiatry; Future    Low serum IgG2 subclass level  -     predniSONE (DELTASONE) 2.5 MG tablet; Take 3 tablets (7.5 mg total) by mouth once daily.  -     ketorolac injection 60 mg  -     methylPREDNISolone acetate injection 80 mg  -     ketorolac (TORADOL) 30 mg/mL (1 mL) injection; Inject 2 mLs (60 mg total) into the vein every 30 days.  -     blunt needle, disposable 18 x 1 1/2 " Ndle; 1 Syringe by Misc.(Non-Drug; Combo Route) route once a week.  -     needle, disp, 25 gauge 25 gauge x 1 1/2" Ndle; 1 each by Misc.(Non-Drug; Combo Route) route every 7 days.  -     ESTEBAN Screen " "w/Reflex; Future  -     Sedimentation rate; Future  -     C-Reactive Protein; Future  -     Ambulatory referral/consult to Podiatry; Future    Low serum IgG4 subclass level  -     predniSONE (DELTASONE) 2.5 MG tablet; Take 3 tablets (7.5 mg total) by mouth once daily.  -     ketorolac injection 60 mg  -     methylPREDNISolone acetate injection 80 mg  -     ketorolac (TORADOL) 30 mg/mL (1 mL) injection; Inject 2 mLs (60 mg total) into the vein every 30 days.  -     blunt needle, disposable 18 x 1 1/2 " Ndle; 1 Syringe by Misc.(Non-Drug; Combo Route) route once a week.  -     needle, disp, 25 gauge 25 gauge x 1 1/2" Ndle; 1 each by Misc.(Non-Drug; Combo Route) route every 7 days.  -     ESTEBAN Screen w/Reflex; Future  -     Sedimentation rate; Future  -     C-Reactive Protein; Future  -     Ambulatory referral/consult to Podiatry; Future    Non-celiac gluten sensitivity  -     predniSONE (DELTASONE) 2.5 MG tablet; Take 3 tablets (7.5 mg total) by mouth once daily.  -     ketorolac injection 60 mg  -     methylPREDNISolone acetate injection 80 mg  -     ketorolac (TORADOL) 30 mg/mL (1 mL) injection; Inject 2 mLs (60 mg total) into the vein every 30 days.  -     blunt needle, disposable 18 x 1 1/2 " Ndle; 1 Syringe by Misc.(Non-Drug; Combo Route) route once a week.  -     needle, disp, 25 gauge 25 gauge x 1 1/2" Ndle; 1 each by Misc.(Non-Drug; Combo Route) route every 7 days.  -     ESTEBAN Screen w/Reflex; Future  -     Sedimentation rate; Future  -     C-Reactive Protein; Future    Immune deficiency disorder  -     predniSONE (DELTASONE) 2.5 MG tablet; Take 3 tablets (7.5 mg total) by mouth once daily.  -     ketorolac injection 60 mg  -     methylPREDNISolone acetate injection 80 mg  -     ketorolac (TORADOL) 30 mg/mL (1 mL) injection; Inject 2 mLs (60 mg total) into the vein every 30 days.  -     blunt needle, disposable 18 x 1 1/2 " Ndle; 1 Syringe by Misc.(Non-Drug; Combo Route) route once a week.  -     needle, disp, 25 " "gauge 25 gauge x 1 1/2" Ndle; 1 each by Misc.(Non-Drug; Combo Route) route every 7 days.  -     ESTEBAN Screen w/Reflex; Future  -     Sedimentation rate; Future  -     C-Reactive Protein; Future    Intractable migraine with aura without status migrainosus  -     ketorolac (TORADOL) 30 mg/mL (1 mL) injection; Inject 2 mLs (60 mg total) into the vein every 30 days.  -     blunt needle, disposable 18 x 1 1/2 " Ndle; 1 Syringe by Misc.(Non-Drug; Combo Route) route once a week.  -     needle, disp, 25 gauge 25 gauge x 1 1/2" Ndle; 1 each by Misc.(Non-Drug; Combo Route) route every 7 days.  -     ESTEBAN Screen w/Reflex; Future  -     Sedimentation rate; Future  -     C-Reactive Protein; Future    Toe joint pain, right  -     Ambulatory referral/consult to Podiatry; Future      1. Add  NP thyroid 15 mg  2. Continue plaquenil  3. Prn  flare      "

## 2022-06-10 ENCOUNTER — LAB VISIT (OUTPATIENT)
Dept: LAB | Facility: HOSPITAL | Age: 47
End: 2022-06-10
Attending: INTERNAL MEDICINE
Payer: COMMERCIAL

## 2022-06-10 ENCOUNTER — PROCEDURE VISIT (OUTPATIENT)
Dept: NEUROLOGY | Facility: CLINIC | Age: 47
End: 2022-06-10
Payer: COMMERCIAL

## 2022-06-10 VITALS
DIASTOLIC BLOOD PRESSURE: 75 MMHG | HEIGHT: 69 IN | RESPIRATION RATE: 17 BRPM | SYSTOLIC BLOOD PRESSURE: 129 MMHG | HEART RATE: 96 BPM | WEIGHT: 193 LBS | BODY MASS INDEX: 28.58 KG/M2

## 2022-06-10 DIAGNOSIS — G43.119 INTRACTABLE MIGRAINE WITH AURA WITHOUT STATUS MIGRAINOSUS: ICD-10-CM

## 2022-06-10 DIAGNOSIS — R79.89 HYPOTHYROXINEMIA: ICD-10-CM

## 2022-06-10 DIAGNOSIS — K90.41 NON-CELIAC GLUTEN SENSITIVITY: ICD-10-CM

## 2022-06-10 DIAGNOSIS — R73.09 DYSGLYCEMIA: ICD-10-CM

## 2022-06-10 DIAGNOSIS — D83.9 CVID (COMMON VARIABLE IMMUNODEFICIENCY): ICD-10-CM

## 2022-06-10 DIAGNOSIS — R76.8 LOW SERUM IGG4 SUBCLASS LEVEL: ICD-10-CM

## 2022-06-10 DIAGNOSIS — R76.8 LOW SERUM IGG2 SUBCLASS LEVEL: ICD-10-CM

## 2022-06-10 DIAGNOSIS — G43.711 INTRACTABLE CHRONIC MIGRAINE WITHOUT AURA AND WITH STATUS MIGRAINOSUS: Primary | ICD-10-CM

## 2022-06-10 DIAGNOSIS — M35.1 MCTD (MIXED CONNECTIVE TISSUE DISEASE): ICD-10-CM

## 2022-06-10 DIAGNOSIS — Z87.442 HISTORY OF NEPHROLITHIASIS: ICD-10-CM

## 2022-06-10 DIAGNOSIS — E55.9 HYPOVITAMINOSIS D: ICD-10-CM

## 2022-06-10 DIAGNOSIS — E88.810 DYSMETABOLIC SYNDROME: ICD-10-CM

## 2022-06-10 DIAGNOSIS — R94.6 THYROID FUNCTION TEST ABNORMAL: ICD-10-CM

## 2022-06-10 DIAGNOSIS — E78.5 DYSLIPIDEMIA: ICD-10-CM

## 2022-06-10 DIAGNOSIS — D84.9 IMMUNE DEFICIENCY DISORDER: ICD-10-CM

## 2022-06-10 DIAGNOSIS — R63.5 WEIGHT GAIN: ICD-10-CM

## 2022-06-10 LAB
25(OH)D3+25(OH)D2 SERPL-MCNC: 42 NG/ML (ref 30–96)
ALBUMIN SERPL BCP-MCNC: 4.2 G/DL (ref 3.5–5.2)
ALP SERPL-CCNC: 31 U/L (ref 55–135)
ALT SERPL W/O P-5'-P-CCNC: 22 U/L (ref 10–44)
AMYLASE SERPL-CCNC: 78 U/L (ref 20–110)
ANION GAP SERPL CALC-SCNC: 11 MMOL/L (ref 8–16)
AST SERPL-CCNC: 16 U/L (ref 10–40)
BASOPHILS # BLD AUTO: 0.08 K/UL (ref 0–0.2)
BASOPHILS NFR BLD: 1.3 % (ref 0–1.9)
BILIRUB SERPL-MCNC: 1.3 MG/DL (ref 0.1–1)
BUN SERPL-MCNC: 13 MG/DL (ref 6–20)
CA-I BLDV-SCNC: 1.34 MMOL/L (ref 1.06–1.42)
CALCIUM SERPL-MCNC: 9.6 MG/DL (ref 8.7–10.5)
CHLORIDE SERPL-SCNC: 106 MMOL/L (ref 95–110)
CHOLEST SERPL-MCNC: 200 MG/DL (ref 120–199)
CHOLEST/HDLC SERPL: 2.7 {RATIO} (ref 2–5)
CO2 SERPL-SCNC: 23 MMOL/L (ref 23–29)
CREAT SERPL-MCNC: 1 MG/DL (ref 0.5–1.4)
CRP SERPL-MCNC: 1.1 MG/L (ref 0–8.2)
DIFFERENTIAL METHOD: ABNORMAL
EOSINOPHIL # BLD AUTO: 0.1 K/UL (ref 0–0.5)
EOSINOPHIL NFR BLD: 1.8 % (ref 0–8)
ERYTHROCYTE [DISTWIDTH] IN BLOOD BY AUTOMATED COUNT: 12.2 % (ref 11.5–14.5)
ERYTHROCYTE [SEDIMENTATION RATE] IN BLOOD BY WESTERGREN METHOD: <2 MM/HR (ref 0–36)
EST. GFR  (AFRICAN AMERICAN): >60 ML/MIN/1.73 M^2
EST. GFR  (NON AFRICAN AMERICAN): >60 ML/MIN/1.73 M^2
ESTIMATED AVG GLUCOSE: 94 MG/DL (ref 68–131)
GLUCOSE SERPL-MCNC: 75 MG/DL (ref 70–110)
HBA1C MFR BLD: 4.9 % (ref 4–5.6)
HCT VFR BLD AUTO: 49.6 % (ref 37–48.5)
HDLC SERPL-MCNC: 74 MG/DL (ref 40–75)
HDLC SERPL: 37 % (ref 20–50)
HGB BLD-MCNC: 16.9 G/DL (ref 12–16)
IMM GRANULOCYTES # BLD AUTO: 0.02 K/UL (ref 0–0.04)
IMM GRANULOCYTES NFR BLD AUTO: 0.3 % (ref 0–0.5)
LDLC SERPL CALC-MCNC: 111 MG/DL (ref 63–159)
LIPASE SERPL-CCNC: 53 U/L (ref 4–60)
LYMPHOCYTES # BLD AUTO: 1.4 K/UL (ref 1–4.8)
LYMPHOCYTES NFR BLD: 22.6 % (ref 18–48)
MCH RBC QN AUTO: 29.7 PG (ref 27–31)
MCHC RBC AUTO-ENTMCNC: 34.1 G/DL (ref 32–36)
MCV RBC AUTO: 87 FL (ref 82–98)
MONOCYTES # BLD AUTO: 0.4 K/UL (ref 0.3–1)
MONOCYTES NFR BLD: 6.3 % (ref 4–15)
NEUTROPHILS # BLD AUTO: 4.2 K/UL (ref 1.8–7.7)
NEUTROPHILS NFR BLD: 67.7 % (ref 38–73)
NONHDLC SERPL-MCNC: 126 MG/DL
NRBC BLD-RTO: 0 /100 WBC
PLATELET # BLD AUTO: 288 K/UL (ref 150–450)
PMV BLD AUTO: 10.7 FL (ref 9.2–12.9)
POTASSIUM SERPL-SCNC: 4.1 MMOL/L (ref 3.5–5.1)
PROT SERPL-MCNC: 6.7 G/DL (ref 6–8.4)
PTH-INTACT SERPL-MCNC: 42.4 PG/ML (ref 9–77)
RBC # BLD AUTO: 5.69 M/UL (ref 4–5.4)
SODIUM SERPL-SCNC: 140 MMOL/L (ref 136–145)
T3 SERPL-MCNC: 82 NG/DL (ref 60–180)
T4 FREE SERPL-MCNC: 0.84 NG/DL (ref 0.71–1.51)
TRIGL SERPL-MCNC: 75 MG/DL (ref 30–150)
TSH SERPL DL<=0.005 MIU/L-ACNC: 1.64 UIU/ML (ref 0.4–4)
URATE SERPL-MCNC: 4.7 MG/DL (ref 2.4–5.7)
WBC # BLD AUTO: 6.19 K/UL (ref 3.9–12.7)

## 2022-06-10 PROCEDURE — 84439 ASSAY OF FREE THYROXINE: CPT | Performed by: INTERNAL MEDICINE

## 2022-06-10 PROCEDURE — 80061 LIPID PANEL: CPT | Performed by: INTERNAL MEDICINE

## 2022-06-10 PROCEDURE — 83036 HEMOGLOBIN GLYCOSYLATED A1C: CPT | Performed by: INTERNAL MEDICINE

## 2022-06-10 PROCEDURE — 86140 C-REACTIVE PROTEIN: CPT | Performed by: INTERNAL MEDICINE

## 2022-06-10 PROCEDURE — 82330 ASSAY OF CALCIUM: CPT | Performed by: INTERNAL MEDICINE

## 2022-06-10 PROCEDURE — 82306 VITAMIN D 25 HYDROXY: CPT | Performed by: INTERNAL MEDICINE

## 2022-06-10 PROCEDURE — 82150 ASSAY OF AMYLASE: CPT | Performed by: INTERNAL MEDICINE

## 2022-06-10 PROCEDURE — 85025 COMPLETE CBC W/AUTO DIFF WBC: CPT | Performed by: INTERNAL MEDICINE

## 2022-06-10 PROCEDURE — 83970 ASSAY OF PARATHORMONE: CPT | Performed by: INTERNAL MEDICINE

## 2022-06-10 PROCEDURE — 84432 ASSAY OF THYROGLOBULIN: CPT | Performed by: INTERNAL MEDICINE

## 2022-06-10 PROCEDURE — 84550 ASSAY OF BLOOD/URIC ACID: CPT | Performed by: INTERNAL MEDICINE

## 2022-06-10 PROCEDURE — 84443 ASSAY THYROID STIM HORMONE: CPT | Performed by: INTERNAL MEDICINE

## 2022-06-10 PROCEDURE — 64615 CHEMODENERV MUSC MIGRAINE: CPT | Mod: S$GLB,,, | Performed by: PSYCHIATRY & NEUROLOGY

## 2022-06-10 PROCEDURE — 83690 ASSAY OF LIPASE: CPT | Performed by: INTERNAL MEDICINE

## 2022-06-10 PROCEDURE — 64615 PR CHEMODENERVATION OF MUSCLE FOR CHRONIC MIGRAINE: ICD-10-PCS | Mod: S$GLB,,, | Performed by: PSYCHIATRY & NEUROLOGY

## 2022-06-10 PROCEDURE — 85652 RBC SED RATE AUTOMATED: CPT | Performed by: INTERNAL MEDICINE

## 2022-06-10 PROCEDURE — 82308 ASSAY OF CALCITONIN: CPT | Performed by: INTERNAL MEDICINE

## 2022-06-10 PROCEDURE — 86038 ANTINUCLEAR ANTIBODIES: CPT | Performed by: INTERNAL MEDICINE

## 2022-06-10 PROCEDURE — 84480 ASSAY TRIIODOTHYRONINE (T3): CPT | Performed by: INTERNAL MEDICINE

## 2022-06-10 PROCEDURE — 80053 COMPREHEN METABOLIC PANEL: CPT | Performed by: INTERNAL MEDICINE

## 2022-06-10 NOTE — PROCEDURES
Procedures  BOTOX PROCEDURE    PROCEDURE PERFORMED: Botulinum toxin injection (81249)    CLINICAL INDICATION: G43.719    A time out was conducted just before the start of the procedure to verify the correct patient and procedure, procedure location, and all relevant critical information.   The patient meets criteria for chronic headaches according to the ICHD-II, the patient has more than 15 headaches a month out of at least 8 are migraines which last for more than 4 hours a day.    The Botox injections have achieved well over 50%  improvement in the patient's symptoms. Migraines have been reduced at least 7 days per month and the number of cumulative hours suffering with headaches has been reduced at least 100 total hours per month. Today she does have a headache indicating that the Botox has worn off. Frequency of treatment is every 3 months unless no response to the treatments, at which time we will discontinue the injections.      DESCRIPTION OF PROCEDURE: After obtaining informed consent and under aseptic technique, a total of 185 units of botulinum toxin type A were injected in the following muscles: Procerus 5 units,  5 units bilaterally, frontalis 20 units, temporalis 20 units bilaterally, Masseters 15 units bilaterally (3 sites, 5 units per site), occipitalis 15 units, upper cervical paraspinals 10 units bilaterally and trapezius 15 units bilaterally. The patient was given a total of 185 units in 37 sites.The patient tolerated the procedure well. There were no complications. The patient was given a prescription for repeat treatment in 3 months.     Unavoidable waste 15 units      Mitchell Hill M.D  Medical Director, Headache and Facial Pain  [facility]

## 2022-06-13 LAB
ANA SER QL IF: NORMAL
CALCIT SERPL-MCNC: <5 PG/ML
THRYOGLOBULIN INTERPRETATION: ABNORMAL
THYROGLOB AB SERPL-ACNC: <1.8 IU/ML
THYROGLOB SERPL-MCNC: 6.2 NG/ML

## 2022-06-14 ENCOUNTER — PATIENT MESSAGE (OUTPATIENT)
Dept: RHEUMATOLOGY | Facility: CLINIC | Age: 47
End: 2022-06-14
Payer: COMMERCIAL

## 2022-06-27 DIAGNOSIS — G43.711 INTRACTABLE CHRONIC MIGRAINE WITHOUT AURA AND WITH STATUS MIGRAINOSUS: ICD-10-CM

## 2022-06-27 RX ORDER — ONDANSETRON 4 MG/1
4 TABLET, FILM COATED ORAL EVERY 6 HOURS PRN
Qty: 30 TABLET | Refills: 11 | Status: SHIPPED | OUTPATIENT
Start: 2022-06-27

## 2022-07-01 ENCOUNTER — TELEPHONE (OUTPATIENT)
Dept: PHARMACY | Facility: CLINIC | Age: 47
End: 2022-07-01
Payer: COMMERCIAL

## 2022-07-01 NOTE — TELEPHONE ENCOUNTER
DOCUMENTATION ONLY   ZOLMitriptan 5MG solution (12 per 30 days)  Approval date: 7/1/2022 to 7/1/2023   Case ID# [id number]

## 2022-08-22 ENCOUNTER — OFFICE VISIT (OUTPATIENT)
Dept: FAMILY MEDICINE | Facility: CLINIC | Age: 47
End: 2022-08-22
Payer: COMMERCIAL

## 2022-08-22 DIAGNOSIS — N39.0 URINARY TRACT INFECTION WITHOUT HEMATURIA, SITE UNSPECIFIED: Primary | ICD-10-CM

## 2022-08-22 PROCEDURE — 99213 PR OFFICE/OUTPT VISIT, EST, LEVL III, 20-29 MIN: ICD-10-PCS | Mod: 95,,, | Performed by: NURSE PRACTITIONER

## 2022-08-22 PROCEDURE — 3044F HG A1C LEVEL LT 7.0%: CPT | Mod: CPTII,95,, | Performed by: NURSE PRACTITIONER

## 2022-08-22 PROCEDURE — 3044F PR MOST RECENT HEMOGLOBIN A1C LEVEL <7.0%: ICD-10-PCS | Mod: CPTII,95,, | Performed by: NURSE PRACTITIONER

## 2022-08-22 PROCEDURE — 99213 OFFICE O/P EST LOW 20 MIN: CPT | Mod: 95,,, | Performed by: NURSE PRACTITIONER

## 2022-08-22 RX ORDER — NITROFURANTOIN 25; 75 MG/1; MG/1
100 CAPSULE ORAL 2 TIMES DAILY
Qty: 14 CAPSULE | Refills: 0 | Status: SHIPPED | OUTPATIENT
Start: 2022-08-22 | End: 2022-08-29

## 2022-08-22 NOTE — PROGRESS NOTES
Subjective:       Patient ID: Loren Lemus is a 46 y.o. female.    Chief Complaint: No chief complaint on file.    The patient location is: [place], [place]  The chief complaint leading to consultation is: UTI    Visit type: audiovisual    Face to Face time with patient: 13  15 minutes of total time spent on the encounter, which includes face to face time and non-face to face time preparing to see the patient (eg, review of tests), Obtaining and/or reviewing separately obtained history, Documenting clinical information in the electronic or other health record, Independently interpreting results (not separately reported) and communicating results to the patient/family/caregiver, or Care coordination (not separately reported).         Each patient to whom he or she provides medical services by telemedicine is:  (1) informed of the relationship between the physician and patient and the respective role of any other health care provider with respect to management of the patient; and (2) notified that he or she may decline to receive medical services by telemedicine and may withdraw from such care at any time.    Notes:   Patient states she has had an odor in her urine x 10 days, says this is the only symptom she gets with a UTI since having her hysterectomy. Says she took Amoxil 500 bid x 5 days and had no resolution of symptoms. Would like a different antibitoic.   Past Medical History:  No date: Allergy  No date: Asthma  No date: Endometriosis  No date: Kidney stone      Comment:  x 1  No date: Migraine  No date: Miscarriage  No date: MVP (mitral valve prolapse)    Past Surgical History:  No date: BREAST SURGERY; Bilateral      Comment:  augmentation with implants  No date: KNEE SURGERY; Left  No date: PELVIC LAPAROSCOPY  1988: SEPTORHINOPLASTY  No date: SINUS SURGERY  No date: TONSILLECTOMY  No date: TYMPANOSTOMY TUBE PLACEMENT    Review of patient's family history indicates:  Problem: Nephrolithiasis      Relation:  "Father          Age of Onset: (Not Specified)          Comment: severe  Problem: Prostate cancer      Relation: Paternal Grandfather          Age of Onset: (Not Specified)  Problem: Clotting disorder      Relation: Mother          Age of Onset: (Not Specified)  Problem: Immunodeficiency      Relation: Maternal Grandmother          Age of Onset: (Not Specified)  Problem: Immunodeficiency      Relation: Daughter          Age of Onset: (Not Specified)  Problem: Immunodeficiency      Relation: Daughter          Age of Onset: (Not Specified)  Problem: Allergic rhinitis      Relation: Neg Hx          Age of Onset: (Not Specified)  Problem: Allergies      Relation: Neg Hx          Age of Onset: (Not Specified)  Problem: Angioedema      Relation: Neg Hx          Age of Onset: (Not Specified)  Problem: Asthma      Relation: Neg Hx          Age of Onset: (Not Specified)  Problem: Atopy      Relation: Neg Hx          Age of Onset: (Not Specified)  Problem: Eczema      Relation: Neg Hx          Age of Onset: (Not Specified)  Problem: Rhinitis      Relation: Neg Hx          Age of Onset: (Not Specified)  Problem: Urticaria      Relation: Neg Hx          Age of Onset: (Not Specified)      Social History    Socioeconomic History      Marital status: Significant Other    Occupational History      Occupation: emergency room nurse with [employer]        Comment: [employer]    Tobacco Use      Smoking status: Never Smoker      Smokeless tobacco: Never Used    Substance and Sexual Activity      Alcohol use: Yes      Drug use: No      Sexual activity: Not Currently        Birth control/protection: Pill      Current Outpatient Medications:  AA/prot/lysine/methio/vit C/B6 (A/G PRO ORAL), , Disp: , Rfl:   blunt needle, disposable 18 x 1 1/2 " Ndle, Use to inject once a week, Disp: 25 each, Rfl: 3  cetirizine (ZYRTEC) 10 MG tablet, Take 10 mg by mouth 2 (two) times a day. , Disp: , Rfl:   DULoxetine (CYMBALTA) 30 MG capsule, Take 1 " "capsule (30 mg total) by mouth 2 (two) times daily., Disp: 60 capsule, Rfl: 5  hydrOXYchloroQUINE (PLAQUENIL) 200 mg tablet, Take 1 tablet (200 mg total) by mouth 2 (two) times daily., Disp: 180 tablet, Rfl: 1  ketorolac (TORADOL) 10 mg tablet, Take 10 mg by mouth every 6 (six) hours., Disp: , Rfl:   ketorolac (TORADOL) 30 mg/mL (1 mL) injection, Inject 2 mLs (60 mg total) into the muscle every 30 days., Disp: 2 mL, Rfl: 3  naproxen sodium (ANAPROX) 550 MG tablet, Take 1 tablet (550 mg total) by mouth 2 (two) times daily., Disp: 60 tablet, Rfl: 2  safety needles 25 gauge x 1 1/2" Ndle, Use to inject every 7 days, Disp: 4 each, Rfl: 3  ondansetron (ZOFRAN) 4 MG tablet, Take 8 mg by mouth as needed for Nausea., Disp: , Rfl:   ondansetron (ZOFRAN) 4 MG tablet, Take 1 tablet (4 mg total) by mouth every 6 (six) hours as needed for Nausea., Disp: 30 tablet, Rfl: 11  predniSONE (DELTASONE) 2.5 MG tablet, Take 3 tablets (7.5 mg total) by mouth once daily., Disp: 90 tablet, Rfl: 11  pregabalin (LYRICA) 25 MG capsule, Take 1 capsule (25 mg total) by mouth 2 (two) times daily., Disp: 60 capsule, Rfl: 3  RYBELSUS 7 mg tablet, Take 1 tablet (7 mg total) by mouth once daily., Disp: 90 tablet, Rfl: 3  thyroid, pork, (ARMOUR THYROID) 15 mg Tab, Take 1 tablet (15 mg total) by mouth daily before breakfast with water, Disp: 30 tablet, Rfl: 11  topiramate (TOPAMAX) 50 MG tablet, Take 1 tablet (50 mg total) by mouth 2 (two) times daily., Disp: 60 tablet, Rfl: 5  ZOLMitriptan (ZOMIG) 5 mg Spry, Use 1 spray in one nostril as needed for migraine. May repeat once in 2 hours. No more than 2 days/week. (Patient taking differently: Use 1 spray in one nostril as needed for migraine. May repeat once in 2 hours. No more than 2 days/week.), Disp: 6 each, Rfl: 5    Current Facility-Administered Medications:  onabotulinumtoxina injection 200 Units, 200 Units, Intramuscular, Q90 Days, Toshia Garcia MD, 200 Units at 10/04/19 1323  onabotulinumtoxina " injection 200 Units, 200 Units, Intramuscular, Q90 Days, Guadalupe Hill MD, 200 Units at 10/08/21 0845  onabotulinumtoxina injection 200 Units, 200 Units, Intramuscular, Q90 Days, Guadalupe Hill MD, 200 Units at 12/22/21 0814  onabotulinumtoxina injection 200 Units, 200 Units, Intramuscular, Q90 Days, Guadalupe Hill MD, 200 Units at 03/09/22 0944  onabotulinumtoxina injection 200 Units, 200 Units, Intramuscular, Q90 Days, Guadalupe Hill MD, 200 Units at 06/10/22 0907        Review of patient's allergies indicates:   -- Codeine -- Hives   -- Covid-19 vacc,mrna(pfizer)(pf) -- Anaphylaxis    --  To influenza   -- Flu vaccine [id number](6mos up) -- Anaphylaxis   -- Latex, natural rubber -- Swelling   -- Oxycodone -- Hives    Dysuria   This is a new problem. The current episode started in the past 7 days. The problem occurs intermittently. The problem has been unchanged. The quality of the pain is described as aching. The pain is at a severity of 1/10. The patient is experiencing no pain. There has been no fever. She is sexually active. There is no history of pyelonephritis. Pertinent negatives include no behavior changes, chills, discharge, flank pain, frequency, hematuria, hesitancy, nausea, possible pregnancy, sweats, urgency, vomiting, weight loss, constipation, rash or withholding. She has tried antibiotics for the symptoms. The treatment provided no relief. Her past medical history is significant for kidney stones. There is no history of catheterization, diabetes insipidus, diabetes mellitus, genitourinary reflux, hypertension, recurrent UTIs, a single kidney, STD, urinary stasis or a urological procedure.     Review of Systems   Constitutional: Negative.  Negative for chills and weight loss.   HENT: Negative.    Respiratory: Negative.    Cardiovascular: Negative.    Gastrointestinal: Negative.  Negative for constipation, nausea and vomiting.   Genitourinary: Positive for dysuria (urinary  odor). Negative for flank pain, frequency, hematuria, hesitancy, pelvic pain, urgency, vaginal discharge, vaginal pain and vaginal dryness.   Musculoskeletal: Negative for back pain.   Integumentary:  Negative for rash.   Neurological: Negative.          Objective:      Physical Exam  Constitutional:       Appearance: Normal appearance.   Neurological:      Mental Status: She is alert and oriented to person, place, and time.         Assessment:       Problem List Items Addressed This Visit    None     Visit Diagnoses     Urinary tract infection without hematuria, site unspecified    -  Primary    Relevant Medications    nitrofurantoin, macrocrystal-monohydrate, (MACROBID) 100 MG capsule    Other Relevant Orders    Urinalysis, Reflex to Urine Culture Urine, Clean Catch          Plan:       1. Urinary tract infection without hematuria, site unspecified  UA at nearest [facility], reflex to culture, treat with macrobid, increase fluids, avoid caffeine. Follow up with PCP if symptoms are not resolving in 48-72 hours, follow up immediately for new or worsening symptoms..   - Urinalysis, Reflex to Urine Culture Urine, Clean Catch  - nitrofurantoin, macrocrystal-monohydrate, (MACROBID) 100 MG capsule; Take 1 capsule (100 mg total) by mouth 2 (two) times daily. for 7 days  Dispense: 14 capsule; Refill: 0

## 2022-08-22 NOTE — PROGRESS NOTES
Answers for HPI/ROS submitted by the patient on 8/21/2022  Chronicity: new  Onset: in the past 7 days  Frequency: intermittently  Progression since onset: unchanged  Pain quality: aching  Pain - numeric: 1/10  Fever: no fever  Sexually active?: Yes  History of pyelonephritis?: No  chills: No  discharge: No  flank pain: No  frequency: No  hematuria: No  hesitancy: No  nausea: No  possible pregnancy: No  sweats: No  urgency: No  vomiting: No  constipation: No  rash: No  weight loss: No  withholding: No  behavior changes: No  Treatments tried: antibiotics  Improvement on treatment: no relief  Pain severity: no pain  catheterization: No  diabetes insipidus: No  diabetes mellitus: No  genitourinary reflux: No  hypertension: No  recurrent UTIs: No  single kidney: No  STD: No  urinary stasis: No  urological procedure: No  kidney stones: Yes

## 2022-08-30 ENCOUNTER — PROCEDURE VISIT (OUTPATIENT)
Dept: NEUROLOGY | Facility: CLINIC | Age: 47
End: 2022-08-30
Payer: COMMERCIAL

## 2022-08-30 VITALS
BODY MASS INDEX: 28.58 KG/M2 | RESPIRATION RATE: 17 BRPM | HEART RATE: 86 BPM | SYSTOLIC BLOOD PRESSURE: 118 MMHG | WEIGHT: 193 LBS | DIASTOLIC BLOOD PRESSURE: 81 MMHG | HEIGHT: 69 IN

## 2022-08-30 DIAGNOSIS — G43.711 INTRACTABLE CHRONIC MIGRAINE WITHOUT AURA AND WITH STATUS MIGRAINOSUS: Primary | ICD-10-CM

## 2022-08-30 DIAGNOSIS — D83.9 CVID (COMMON VARIABLE IMMUNODEFICIENCY): ICD-10-CM

## 2022-08-30 DIAGNOSIS — K90.41 NON-CELIAC GLUTEN SENSITIVITY: ICD-10-CM

## 2022-08-30 DIAGNOSIS — R76.8 LOW SERUM IGG2 SUBCLASS LEVEL: ICD-10-CM

## 2022-08-30 DIAGNOSIS — R76.8 LOW SERUM IGG4 SUBCLASS LEVEL: ICD-10-CM

## 2022-08-30 DIAGNOSIS — G43.119 INTRACTABLE MIGRAINE WITH AURA WITHOUT STATUS MIGRAINOSUS: ICD-10-CM

## 2022-08-30 DIAGNOSIS — D84.9 IMMUNE DEFICIENCY DISORDER: ICD-10-CM

## 2022-08-30 DIAGNOSIS — M35.1 MCTD (MIXED CONNECTIVE TISSUE DISEASE): ICD-10-CM

## 2022-08-30 PROCEDURE — 96372 PR INJECTION,THERAP/PROPH/DIAG2ST, IM OR SUBCUT: ICD-10-PCS | Mod: S$GLB,,, | Performed by: PSYCHIATRY & NEUROLOGY

## 2022-08-30 PROCEDURE — 64615 CHEMODENERV MUSC MIGRAINE: CPT | Mod: S$GLB,,, | Performed by: PSYCHIATRY & NEUROLOGY

## 2022-08-30 PROCEDURE — 96372 THER/PROPH/DIAG INJ SC/IM: CPT | Mod: S$GLB,,, | Performed by: PSYCHIATRY & NEUROLOGY

## 2022-08-30 PROCEDURE — 64615 PR CHEMODENERVATION OF MUSCLE FOR CHRONIC MIGRAINE: ICD-10-PCS | Mod: S$GLB,,, | Performed by: PSYCHIATRY & NEUROLOGY

## 2022-08-30 RX ORDER — KETOROLAC TROMETHAMINE 30 MG/ML
60 INJECTION, SOLUTION INTRAMUSCULAR; INTRAVENOUS ONCE
Status: COMPLETED | OUTPATIENT
Start: 2022-08-30 | End: 2022-08-30

## 2022-08-30 RX ORDER — KETOROLAC TROMETHAMINE 30 MG/ML
60 INJECTION, SOLUTION INTRAMUSCULAR; INTRAVENOUS
Qty: 2 ML | Refills: 3 | Status: CANCELLED | OUTPATIENT
Start: 2022-08-30 | End: 2023-08-30

## 2022-08-30 RX ORDER — ERENUMAB-AOOE 140 MG/ML
140 INJECTION, SOLUTION SUBCUTANEOUS
Qty: 1 ML | Refills: 11 | Status: SHIPPED | OUTPATIENT
Start: 2022-08-30 | End: 2023-10-23 | Stop reason: CLARIF

## 2022-08-30 RX ADMIN — KETOROLAC TROMETHAMINE 60 MG: 30 INJECTION, SOLUTION INTRAMUSCULAR; INTRAVENOUS at 10:08

## 2022-08-30 NOTE — PROCEDURES
ProceduresBOTOX PROCEDURE    PROCEDURE PERFORMED: Botulinum toxin injection (90380)    CLINICAL INDICATION: G43.719    A time out was conducted just before the start of the procedure to verify the correct patient and procedure, procedure location, and all relevant critical information.   The patient meets criteria for chronic headaches according to the ICHD-II, the patient has more than 15 headaches a month out of at least 8 are migraines which last for more than 4 hours a day.    The Botox injections have achieved well over 50%  improvement in the patient's symptoms. Migraines have been reduced at least 7 days per month and the number of cumulative hours suffering with headaches has been reduced at least 100 total hours per month. Today she does have a headache indicating that the Botox has worn off. Frequency of treatment is every 3 months unless no response to the treatments, at which time we will discontinue the injections.      DESCRIPTION OF PROCEDURE: After obtaining informed consent and under aseptic technique, a total of 185 units of botulinum toxin type A were injected in the following muscles: Procerus 5 units,  5 units bilaterally, frontalis 20 units, temporalis 20 units bilaterally, Masseters 15 units bilaterally (3 sites, 5 units per site), occipitalis 15 units, upper cervical paraspinals 10 units bilaterally and trapezius 15 units bilaterally. The patient was given a total of 185 units in 37 sites.The patient tolerated the procedure well. There were no complications. The patient was given a prescription for repeat treatment in 3 months.     Unavoidable waste 15 units      Mitchell Hill M.D  Medical Director, Headache and Facial Pain  [facility]

## 2022-08-31 ENCOUNTER — PATIENT MESSAGE (OUTPATIENT)
Dept: RHEUMATOLOGY | Facility: CLINIC | Age: 47
End: 2022-08-31
Payer: COMMERCIAL

## 2022-08-31 DIAGNOSIS — D83.9 CVID (COMMON VARIABLE IMMUNODEFICIENCY): ICD-10-CM

## 2022-08-31 DIAGNOSIS — M35.1 MCTD (MIXED CONNECTIVE TISSUE DISEASE): ICD-10-CM

## 2022-08-31 DIAGNOSIS — D84.9 IMMUNE DEFICIENCY DISORDER: ICD-10-CM

## 2022-08-31 DIAGNOSIS — G43.119 INTRACTABLE MIGRAINE WITH AURA WITHOUT STATUS MIGRAINOSUS: ICD-10-CM

## 2022-08-31 DIAGNOSIS — R76.8 LOW SERUM IGG2 SUBCLASS LEVEL: ICD-10-CM

## 2022-08-31 DIAGNOSIS — R76.8 LOW SERUM IGG4 SUBCLASS LEVEL: ICD-10-CM

## 2022-08-31 DIAGNOSIS — K90.41 NON-CELIAC GLUTEN SENSITIVITY: ICD-10-CM

## 2022-08-31 RX ORDER — KETOROLAC TROMETHAMINE 30 MG/ML
60 INJECTION, SOLUTION INTRAMUSCULAR; INTRAVENOUS
Qty: 2 ML | Refills: 3 | Status: CANCELLED | OUTPATIENT
Start: 2022-08-30 | End: 2023-08-30

## 2022-09-01 ENCOUNTER — TELEPHONE (OUTPATIENT)
Dept: PHARMACY | Facility: CLINIC | Age: 47
End: 2022-09-01
Payer: COMMERCIAL

## 2022-09-01 ENCOUNTER — PATIENT MESSAGE (OUTPATIENT)
Dept: NEUROLOGY | Facility: CLINIC | Age: 47
End: 2022-09-01
Payer: COMMERCIAL

## 2022-09-04 RX ORDER — KETOROLAC TROMETHAMINE 30 MG/ML
60 INJECTION, SOLUTION INTRAMUSCULAR; INTRAVENOUS
Qty: 2 ML | Refills: 3 | Status: SHIPPED | OUTPATIENT
Start: 2022-09-04 | End: 2023-05-31 | Stop reason: SDUPTHER

## 2022-09-19 ENCOUNTER — PATIENT MESSAGE (OUTPATIENT)
Dept: ENDOCRINOLOGY | Facility: CLINIC | Age: 47
End: 2022-09-19
Payer: COMMERCIAL

## 2022-09-20 ENCOUNTER — DOCUMENTATION ONLY (OUTPATIENT)
Dept: ENDOCRINOLOGY | Facility: CLINIC | Age: 47
End: 2022-09-20
Payer: COMMERCIAL

## 2022-09-20 ENCOUNTER — PATIENT MESSAGE (OUTPATIENT)
Dept: RHEUMATOLOGY | Facility: CLINIC | Age: 47
End: 2022-09-20
Payer: COMMERCIAL

## 2022-09-20 DIAGNOSIS — E88.810 DYSMETABOLIC SYNDROME: Primary | ICD-10-CM

## 2022-09-20 DIAGNOSIS — R73.09 DYSGLYCEMIA: ICD-10-CM

## 2022-09-20 RX ORDER — SEMAGLUTIDE 1.34 MG/ML
1 INJECTION, SOLUTION SUBCUTANEOUS
Qty: 3 PEN | Refills: 3 | Status: SHIPPED | OUTPATIENT
Start: 2022-09-20 | End: 2022-10-20

## 2022-09-20 NOTE — PROGRESS NOTES
Patient has noticed progressive weight gain since being commenced on systemic oral prednisone therapy.  See weight trajectory over last ~ 9 months;  Vitals - 1 value per visit 12/22/2021 3/9/2022 3/9/2022 5/18/2022 5/18/2022   SPO2        Weight (lb) 184.3  190.37  190   Weight (kg) 83.6  86.35  86.183   Height 69  69  69   BMI (Calculated) 27.2  28.1  28     Vitals - 1 value per visit 6/8/2022 6/8/2022 6/10/2022 6/10/2022 8/30/2022   SPO2        Weight (lb)  193.12  193    Weight (kg)  87.6  87.544    Height  69.016  69    BMI (Calculated)  28.5  28.5      Vitals - 1 value per visit 8/30/2022   SPO2    Weight (lb) 193   Weight (kg) 87.544   Height 69   BMI (Calculated) 28.5     This has continued despite ongoing use of rybelsus 7mg QD. To try and stem this trend I have now switched her instead to SC Ozempic 1mg wkly.

## 2022-09-23 DIAGNOSIS — E88.810 DYSMETABOLIC SYNDROME: Primary | ICD-10-CM

## 2022-09-23 DIAGNOSIS — R73.09 DYSGLYCEMIA: ICD-10-CM

## 2022-09-23 RX ORDER — TIRZEPATIDE 2.5 MG/.5ML
2.5 INJECTION, SOLUTION SUBCUTANEOUS
Qty: 4 PEN | Refills: 3 | Status: SHIPPED | OUTPATIENT
Start: 2022-09-23 | End: 2022-10-28 | Stop reason: ALTCHOICE

## 2022-10-14 DIAGNOSIS — R76.8 ANA POSITIVE: ICD-10-CM

## 2022-10-14 DIAGNOSIS — M35.1 MCTD (MIXED CONNECTIVE TISSUE DISEASE): ICD-10-CM

## 2022-10-14 RX ORDER — HYDROXYCHLOROQUINE SULFATE 200 MG/1
200 TABLET, FILM COATED ORAL 2 TIMES DAILY
Qty: 180 TABLET | Refills: 1 | Status: CANCELLED | OUTPATIENT
Start: 2022-10-14 | End: 2023-01-12

## 2022-10-17 DIAGNOSIS — R76.8 ANA POSITIVE: ICD-10-CM

## 2022-10-17 DIAGNOSIS — M35.1 MCTD (MIXED CONNECTIVE TISSUE DISEASE): ICD-10-CM

## 2022-10-17 RX ORDER — HYDROXYCHLOROQUINE SULFATE 200 MG/1
200 TABLET, FILM COATED ORAL 2 TIMES DAILY
Qty: 180 TABLET | Refills: 1 | Status: CANCELLED | OUTPATIENT
Start: 2022-10-14 | End: 2023-01-12

## 2022-10-20 ENCOUNTER — OFFICE VISIT (OUTPATIENT)
Dept: RHEUMATOLOGY | Facility: CLINIC | Age: 47
End: 2022-10-20
Payer: COMMERCIAL

## 2022-10-20 VITALS
SYSTOLIC BLOOD PRESSURE: 111 MMHG | WEIGHT: 194 LBS | BODY MASS INDEX: 28.73 KG/M2 | HEIGHT: 69 IN | HEART RATE: 87 BPM | DIASTOLIC BLOOD PRESSURE: 79 MMHG

## 2022-10-20 DIAGNOSIS — N39.0 URINARY TRACT INFECTION WITHOUT HEMATURIA, SITE UNSPECIFIED: ICD-10-CM

## 2022-10-20 DIAGNOSIS — K90.41 NON-CELIAC GLUTEN SENSITIVITY: ICD-10-CM

## 2022-10-20 DIAGNOSIS — R76.8 ANA POSITIVE: ICD-10-CM

## 2022-10-20 DIAGNOSIS — M35.1 MCTD (MIXED CONNECTIVE TISSUE DISEASE): Primary | ICD-10-CM

## 2022-10-20 PROCEDURE — 3074F SYST BP LT 130 MM HG: CPT | Mod: CPTII,S$GLB,, | Performed by: INTERNAL MEDICINE

## 2022-10-20 PROCEDURE — 3044F HG A1C LEVEL LT 7.0%: CPT | Mod: CPTII,S$GLB,, | Performed by: INTERNAL MEDICINE

## 2022-10-20 PROCEDURE — 3078F PR MOST RECENT DIASTOLIC BLOOD PRESSURE < 80 MM HG: ICD-10-PCS | Mod: CPTII,S$GLB,, | Performed by: INTERNAL MEDICINE

## 2022-10-20 PROCEDURE — 3078F DIAST BP <80 MM HG: CPT | Mod: CPTII,S$GLB,, | Performed by: INTERNAL MEDICINE

## 2022-10-20 PROCEDURE — 99213 PR OFFICE/OUTPT VISIT, EST, LEVL III, 20-29 MIN: ICD-10-PCS | Mod: S$GLB,,, | Performed by: INTERNAL MEDICINE

## 2022-10-20 PROCEDURE — 99999 PR PBB SHADOW E&M-EST. PATIENT-LVL III: ICD-10-PCS | Mod: PBBFAC,,, | Performed by: INTERNAL MEDICINE

## 2022-10-20 PROCEDURE — 3044F PR MOST RECENT HEMOGLOBIN A1C LEVEL <7.0%: ICD-10-PCS | Mod: CPTII,S$GLB,, | Performed by: INTERNAL MEDICINE

## 2022-10-20 PROCEDURE — 99213 OFFICE O/P EST LOW 20 MIN: CPT | Mod: S$GLB,,, | Performed by: INTERNAL MEDICINE

## 2022-10-20 PROCEDURE — 3074F PR MOST RECENT SYSTOLIC BLOOD PRESSURE < 130 MM HG: ICD-10-PCS | Mod: CPTII,S$GLB,, | Performed by: INTERNAL MEDICINE

## 2022-10-20 PROCEDURE — 99999 PR PBB SHADOW E&M-EST. PATIENT-LVL III: CPT | Mod: PBBFAC,,, | Performed by: INTERNAL MEDICINE

## 2022-10-20 RX ORDER — HYDROXYCHLOROQUINE SULFATE 200 MG/1
200 TABLET, FILM COATED ORAL 2 TIMES DAILY
Qty: 180 TABLET | Refills: 3 | Status: SHIPPED | OUTPATIENT
Start: 2022-10-20 | End: 2023-01-18

## 2022-10-20 RX ORDER — HYDROXYCHLOROQUINE SULFATE 200 MG/1
200 TABLET, FILM COATED ORAL 2 TIMES DAILY
Qty: 180 TABLET | Refills: 11 | OUTPATIENT
Start: 2022-10-20 | End: 2022-10-20

## 2022-10-20 RX ORDER — SULFAMETHOXAZOLE AND TRIMETHOPRIM 800; 160 MG/1; MG/1
1 TABLET ORAL 2 TIMES DAILY
Qty: 20 TABLET | Refills: 0 | Status: SHIPPED | OUTPATIENT
Start: 2022-10-20 | End: 2022-10-30

## 2022-10-20 RX ORDER — FLUCONAZOLE 150 MG/1
150 TABLET ORAL DAILY
Qty: 3 TABLET | Refills: 3 | Status: SHIPPED | OUTPATIENT
Start: 2022-10-20 | End: 2022-11-20

## 2022-10-20 NOTE — PROGRESS NOTES
Subjective:       Patient ID: Loren Lemus is a 46 y.o. female.    Chief Complaint: Disease Management    Follow up: 47 yo female with CVID, non celiac gluten hypersenitivity, hashimoto's thyroiditis, MCDT  On plaquenil. H/o migraine Patient complains of arthralgias and myalgias for which has been present for a few years. Pain is located in multiple joints, both shoulder(s), both elbow(s), both wrist(s), both MCP(s): 1st, 2nd, 3rd, 4th and 5th, both PIP(s): 1st, 2nd, 3rd, 4th and 5th, both DIP(s): 1st and 2nd, both hip(s), both knee(s) and both MTP(s): 1st, 2nd, 3rd, 4th and 5th, is described as aching, pulsating, shooting and throbbing, and is constant, moderate .  Associated symptoms include: crepitation, decreased range of motion, edema, effusion, tenderness and warmth.      Review of Systems   Constitutional:  Positive for fatigue. Negative for activity change, appetite change, chills, diaphoresis, fever and unexpected weight change.   HENT:  Negative for congestion, dental problem, ear discharge, ear pain, facial swelling, mouth sores, nosebleeds, postnasal drip, rhinorrhea, sinus pressure, sneezing, sore throat, tinnitus, trouble swallowing and voice change.    Eyes:  Negative for photophobia, pain, discharge, redness and itching.   Respiratory:  Negative for apnea, cough, chest tightness, shortness of breath and wheezing.    Cardiovascular:  Negative for chest pain, palpitations and leg swelling.   Gastrointestinal:  Negative for abdominal distention, abdominal pain, constipation, diarrhea, nausea and vomiting.   Endocrine: Negative for cold intolerance, heat intolerance, polydipsia and polyuria.   Genitourinary:  Negative for decreased urine volume, difficulty urinating, dysuria, flank pain, frequency, genital sores, hematuria and urgency.   Musculoskeletal:  Positive for joint swelling and myalgias. Negative for arthralgias, back pain, gait problem, neck pain and neck stiffness.   Skin:  Negative for  "pallor, rash and wound.   Allergic/Immunologic: Negative for immunocompromised state.   Neurological:  Negative for dizziness, tremors, weakness, numbness and headaches.   Hematological:  Negative for adenopathy. Does not bruise/bleed easily.   Psychiatric/Behavioral:  Negative for sleep disturbance. The patient is not nervous/anxious.        Objective:   /79   Pulse 87   Ht 5' 9" (1.753 m)   Wt 88 kg (194 lb)   LMP 11/19/2016   BMI 28.65 kg/m²      Physical Exam   Constitutional: She is oriented to person, place, and time.   HENT:   Head: Normocephalic and atraumatic.   Mouth/Throat: Oropharynx is clear and moist.   Eyes: Pupils are equal, round, and reactive to light.   Neck: No thyromegaly present.   Cardiovascular: Normal rate, regular rhythm and normal heart sounds. Exam reveals no gallop and no friction rub.   No murmur heard.  Pulmonary/Chest: Breath sounds normal. She has no wheezes. She has no rales. She exhibits no tenderness.   Abdominal: There is no abdominal tenderness. There is no rebound and no guarding.   Musculoskeletal:      Right shoulder: Tenderness present.      Left shoulder: Tenderness present.      Right elbow: Normal.      Left elbow: Tenderness present.      Right wrist: Tenderness present.      Left wrist: Tenderness present.      Cervical back: Neck supple.      Right knee: Effusion present. Tenderness present.      Left knee: Effusion present. Tenderness present.   Lymphadenopathy:     She has no cervical adenopathy.   Neurological: She is alert and oriented to person, place, and time. Gait normal.   Skin: Rash noted. There is erythema. There is pallor.   Psychiatric: Mood, memory, affect and judgment normal.   Vitals reviewed.      Right Side Rheumatological Exam     Examination finds the elbow normal.    The patient is tender to palpation of the shoulder, wrist, knee, 1st PIP, 1st MCP, 2nd PIP, 2nd MCP, 3rd PIP, 3rd MCP, 4th PIP, 4th MCP, 5th PIP and 5th MCP    Shoulder Exam "   Tenderness Location: acromion    Range of Motion   Active abduction:  abnormal   Adduction: abnormal  Sensation: normal    Knee Exam   Tenderness Location: medial joint line and LCL  Patellofemoral Crepitus: positive  Effusion: positive  Sensation: normal    Hip Exam   Tenderness Location: posterior and lateral  Sensation: normal    Elbow/Wrist Exam   Tenderness Location: no tenderness  Sensation: normal    Left Side Rheumatological Exam     The patient is tender to palpation of the shoulder, elbow, wrist, knee, 1st PIP, 1st MCP, 2nd PIP, 2nd MCP, 3rd PIP, 3rd MCP, 4th PIP, 4th MCP, 5th PIP and 5th MCP.    Shoulder Exam   Tenderness Location: acromion    Range of Motion   Active abduction:  abnormal   Sensation: normal    Knee Exam   Tenderness Location: lateral joint line and medial joint line    Patellofemoral Crepitus: positive  Effusion: positive  Sensation: normal    Hip Exam   Tenderness Location: posterior and lateral  Sensation: normal    Elbow/Wrist Exam   Sensation: normal      Back/Neck Exam   Tenderness Right paramedian tenderness of the Upper C-Spine, Upper T-Spine, Upper L-Spine and SI Joint.Left paramedian tenderness of the Upper C-Spine, Upper T-Spine, SI Joint and Upper L-Spine.       Results for orders placed or performed in visit on 06/10/22   Urinalysis   Result Value Ref Range    Specimen UA Urine, Clean Catch     Color, UA Yellow Yellow, Straw, Marija    Appearance, UA Clear Clear    pH, UA 7.0 5.0 - 8.0    Specific Gravity, UA >=1.030 (A) 1.005 - 1.030    Protein, UA Negative Negative    Glucose, UA Negative Negative    Ketones, UA Negative Negative    Bilirubin (UA) Negative Negative    Occult Blood UA Negative Negative    Nitrite, UA Negative Negative    Leukocytes, UA Negative Negative   Microalbumin/Creatinine Ratio, Urine   Result Value Ref Range    Microalbumin, Urine 12.0 ug/mL    Creatinine, Urine 138.0 15.0 - 325.0 mg/dL    Microalb/Creat Ratio 8.7 0.0 - 30.0 ug/mg     reviewed labs  with patient during this visit         Assessment:       1. MCTD (mixed connective tissue disease)    2. Urinary tract infection without hematuria, site unspecified    3. ESTEBAN positive    4. Non-celiac gluten sensitivity              Plan:       Loren was seen today for disease management.    Diagnoses and all orders for this visit:    MCTD (mixed connective tissue disease)  -     hydrOXYchloroQUINE (PLAQUENIL) 200 mg tablet; Take 1 tablet (200 mg total) by mouth 2 (two) times daily.  -     ESTEBAN Screen w/Reflex; Future  -     CBC Auto Differential; Future  -     Comprehensive Metabolic Panel; Future  -     Sedimentation rate; Future  -     C-Reactive Protein; Future  -     TSH; Future  -     T4, Free; Future  -     T3; Future    Urinary tract infection without hematuria, site unspecified  -     sulfamethoxazole-trimethoprim 800-160mg (BACTRIM DS) 800-160 mg Tab; Take 1 tablet by mouth 2 (two) times daily. for 10 days  -     fluconazole (DIFLUCAN) 150 MG Tab; Take 1 tablet (150 mg total) by mouth once daily. for 3 days  -     ESTEBAN Screen w/Reflex; Future  -     CBC Auto Differential; Future  -     Comprehensive Metabolic Panel; Future  -     Sedimentation rate; Future  -     C-Reactive Protein; Future  -     TSH; Future  -     T4, Free; Future  -     T3; Future    ESTEBAN positive  -     hydrOXYchloroQUINE (PLAQUENIL) 200 mg tablet; Take 1 tablet (200 mg total) by mouth 2 (two) times daily.    Non-celiac gluten sensitivity  -     ESTEBAN Screen w/Reflex; Future  -     CBC Auto Differential; Future  -     Comprehensive Metabolic Panel; Future  -     Sedimentation rate; Future  -     C-Reactive Protein; Future  -     TSH; Future  -     T4, Free; Future  -     T3; Future      1. Add  NP thyroid 15 mg  2. Continue plaquenil  3. Prn  flare

## 2022-10-22 DIAGNOSIS — E06.3 HASHIMOTO'S THYROIDITIS: ICD-10-CM

## 2022-10-22 RX ORDER — THYROID 15 MG/1
15 TABLET ORAL
Qty: 30 TABLET | Refills: 11 | Status: CANCELLED | OUTPATIENT
Start: 2022-10-22 | End: 2023-10-22

## 2022-10-25 DIAGNOSIS — E06.3 HASHIMOTO'S THYROIDITIS: ICD-10-CM

## 2022-10-28 ENCOUNTER — PATIENT MESSAGE (OUTPATIENT)
Dept: ENDOCRINOLOGY | Facility: CLINIC | Age: 47
End: 2022-10-28
Payer: COMMERCIAL

## 2022-10-28 DIAGNOSIS — E88.810 DYSMETABOLIC SYNDROME: ICD-10-CM

## 2022-10-28 DIAGNOSIS — R73.09 DYSGLYCEMIA: ICD-10-CM

## 2022-10-30 RX ORDER — THYROID 15 MG/1
15 TABLET ORAL
Qty: 30 TABLET | Refills: 11 | Status: SHIPPED | OUTPATIENT
Start: 2022-10-30 | End: 2023-05-31 | Stop reason: SDUPTHER

## 2023-04-13 ENCOUNTER — PATIENT MESSAGE (OUTPATIENT)
Dept: RHEUMATOLOGY | Facility: CLINIC | Age: 48
End: 2023-04-13
Payer: COMMERCIAL

## 2023-04-17 ENCOUNTER — TELEPHONE (OUTPATIENT)
Dept: PHARMACY | Facility: CLINIC | Age: 48
End: 2023-04-17
Payer: COMMERCIAL

## 2023-04-17 ENCOUNTER — OFFICE VISIT (OUTPATIENT)
Dept: ENDOCRINOLOGY | Facility: CLINIC | Age: 48
End: 2023-04-17
Payer: COMMERCIAL

## 2023-04-17 VITALS
DIASTOLIC BLOOD PRESSURE: 82 MMHG | WEIGHT: 191.81 LBS | SYSTOLIC BLOOD PRESSURE: 118 MMHG | BODY MASS INDEX: 28.41 KG/M2 | TEMPERATURE: 98 F | HEIGHT: 69 IN | OXYGEN SATURATION: 95 % | HEART RATE: 99 BPM

## 2023-04-17 DIAGNOSIS — E78.5 DYSLIPIDEMIA: ICD-10-CM

## 2023-04-17 DIAGNOSIS — E55.9 HYPOVITAMINOSIS D: ICD-10-CM

## 2023-04-17 DIAGNOSIS — Z87.42 HISTORY OF ENDOMETRIOSIS: ICD-10-CM

## 2023-04-17 DIAGNOSIS — N95.1 PERIMENOPAUSE: ICD-10-CM

## 2023-04-17 DIAGNOSIS — R63.5 WEIGHT GAIN: ICD-10-CM

## 2023-04-17 DIAGNOSIS — Z87.442 HISTORY OF NEPHROLITHIASIS: ICD-10-CM

## 2023-04-17 DIAGNOSIS — E88.810 DYSMETABOLIC SYNDROME: Primary | ICD-10-CM

## 2023-04-17 DIAGNOSIS — R73.09 DYSGLYCEMIA: ICD-10-CM

## 2023-04-17 DIAGNOSIS — G43.109 MIGRAINE WITH AURA AND WITHOUT STATUS MIGRAINOSUS, NOT INTRACTABLE: ICD-10-CM

## 2023-04-17 DIAGNOSIS — R94.6 THYROID FUNCTION TEST ABNORMAL: ICD-10-CM

## 2023-04-17 PROCEDURE — 99999 PR PBB SHADOW E&M-EST. PATIENT-LVL V: CPT | Mod: PBBFAC,,, | Performed by: INTERNAL MEDICINE

## 2023-04-17 PROCEDURE — 99214 PR OFFICE/OUTPT VISIT, EST, LEVL IV, 30-39 MIN: ICD-10-PCS | Mod: S$GLB,,, | Performed by: INTERNAL MEDICINE

## 2023-04-17 PROCEDURE — 99999 PR PBB SHADOW E&M-EST. PATIENT-LVL V: ICD-10-PCS | Mod: PBBFAC,,, | Performed by: INTERNAL MEDICINE

## 2023-04-17 PROCEDURE — 99214 OFFICE O/P EST MOD 30 MIN: CPT | Mod: S$GLB,,, | Performed by: INTERNAL MEDICINE

## 2023-04-17 PROCEDURE — 99215 OFFICE O/P EST HI 40 MIN: CPT | Mod: PBBFAC,PO | Performed by: INTERNAL MEDICINE

## 2023-04-17 RX ORDER — SEMAGLUTIDE 1.34 MG/ML
1 INJECTION, SOLUTION SUBCUTANEOUS
Qty: 9 ML | Refills: 3 | Status: SHIPPED | OUTPATIENT
Start: 2023-04-17 | End: 2023-05-31

## 2023-04-17 NOTE — PROGRESS NOTES
Subjective:      Patient ID: Loren Lemus is a 47 y.o. female.    Chief Complaint:      Patient is a 47 yr old lady seen in [facility] up on account of weight gain and ongoing voluntary weight loss efforts with Rybelsus use.     History of Present Illness    The patient, Ms Lemus is a 47 yr old lady seen in [facility] today on account of voluntary weight loss due to prior weight gain using Rybelsus and Topiramate. She is presently on Topamax 50mg BID and Rybelsus 7mg Qd.   Her background comorbidities are as follows;    1. Weight gain     2. Migraine with aura and without status migrainosus, not intractable     3. Dysglycemia     4. Dysmetabolic syndrome     5. History of endometriosis     6. History of nephrolithiasis       She is s/p STEPHEN ~ 2016 but has ovaries insitu.  She does have intermittent hot flashes presently.  She started Rybelsus ~ 07/2020. She has lost ~ 77lbs since commencing same.  She has has intermittent episodes of abdominal discomfort without vomiting.   Patients daughter does have thyroid dysfunction and thyroid nodules.    Patient does not smoke.  Patient drinks very infrequently.   Grav 4 Para 3+1 ( 3 alive).  She has no fresh complaints today.  Patient regained some weight over the winter period when she was of the Rybelsus for ~ 6mths.  ?? Prednisone dosing and on plaquenil 200mg BID.  She is also on armor thyroid 15mg Qd for her thyroid.  She has no present vasomotor problems and since starting low dose armor thyroid with Dr Rasmussen she feels much better and has noticed improved scalp hair growth.  Patient has been having a recently has been having flares of her inflammatory arthritis.   Patient has been put on Lyrica on account restless leg symptoms.       Review of Systems   Constitutional:  Positive for diaphoresis (intermittent; mainly nocturnal and associated with flushing). Negative for activity change, appetite change, fatigue and unexpected weight change.   HENT:  Negative for facial  "swelling, sore throat, trouble swallowing and voice change.    Eyes:  Negative for photophobia and visual disturbance.   Respiratory:  Negative for cough and shortness of breath.    Cardiovascular:  Negative for chest pain, palpitations and leg swelling.   Gastrointestinal:  Positive for nausea (intermittent). Negative for abdominal distention, abdominal pain, constipation and diarrhea.   Endocrine: Negative for cold intolerance, heat intolerance, polydipsia, polyphagia and polyuria.   Genitourinary:  Negative for difficulty urinating, dysuria, flank pain, frequency, hematuria, menstrual problem (S/p STEPHEN for endometriosis) and urgency.   Musculoskeletal:  Negative for arthralgias, back pain, gait problem, neck pain and neck stiffness.   Skin:  Negative for color change, pallor and rash.   Neurological:  Positive for headaches (Chronic migrainous headaches on Rx.). Negative for dizziness, tremors, seizures, syncope, weakness, light-headedness and numbness.   Hematological:  Does not bruise/bleed easily.   Psychiatric/Behavioral:  Negative for agitation, confusion, dysphoric mood, hallucinations and sleep disturbance. The patient is not nervous/anxious.      Objective: LMP 11/19/2016    /82 (BP Location: Left arm, Patient Position: Sitting, BP Method: Small (Manual))   Pulse 99   Temp 97.7 °F (36.5 °C) (Oral)   Ht 5' 9" (1.753 m)   Wt 87 kg (191 lb 12.8 oz)   LMP 11/19/2016   SpO2 95%   BMI 28.32 kg/m² Body surface area is 2.06 meters squared.  Over the last year she has been essentially weight neutral.             Physical Exam    Vitals and nursing note reviewed.   Constitutional:       General: She is not in acute distress.     Appearance: She is well-developed. She is not ill-appearing or diaphoretic.      Comments: Pleasant young lady. Not pale, anicteric and afebrile. Well hydrated. Not in any acute distress. Clinically comfortable.   HENT:      Head: Normocephalic and atraumatic. Not macrocephalic. " No right periorbital erythema or left periorbital erythema. Hair is normal.      Comments: No nuchal AN.     Nose: Nose normal.      Mouth/Throat:      Mouth: Mucous membranes are moist. Mucous membranes are not pale and not dry.      Pharynx: No oropharyngeal exudate.   Eyes:      General: Lids are normal. No scleral icterus.        Right eye: No discharge.         Left eye: No discharge.      Extraocular Movements: Extraocular movements intact.      Conjunctiva/sclera: Conjunctivae normal.      Pupils: Pupils are equal, round, and reactive to light.   Neck:      Thyroid: No thyromegaly.      Vascular: Normal carotid pulses. No carotid bruit or JVD.      Trachea: Trachea normal. No tracheal deviation.   Cardiovascular:      Rate and Rhythm: Regular rhythm.      Chest Wall: PMI is not displaced.      Pulses: Normal pulses.      Heart sounds: Normal heart sounds, S1 normal and S2 normal. No murmur heard.   No gallop.       Comments: Pulse good volume.   Pulmonary:      Effort: Pulmonary effort is normal. No respiratory distress.      Breath sounds: Normal breath sounds. No wheezing or rales.   Abdominal:      General: Abdomen is flat. Bowel sounds are normal. There is no distension.      Palpations: Abdomen is soft. There is no hepatomegaly, splenomegaly or mass.      Tenderness: There is no abdominal tenderness. There is no guarding or rebound.      Hernia: No hernia is present. There is no hernia in the ventral area.   Musculoskeletal:         General: No swelling or tenderness.      Right shoulder: No deformity, bony tenderness or crepitus. Normal range of motion. Normal strength. Normal pulse.      Cervical back: Normal range of motion.      Comments: No pedal edema nor calf tenderness.   Lymphadenopathy:      Cervical: No cervical adenopathy.   Skin:     General: Skin is warm and dry.      Coloration: Skin is not jaundiced or pale.      Findings: No bruising, ecchymosis, erythema, petechiae or rash.      Nails:  There is no clubbing.   Neurological:      General: No focal deficit present.      Mental Status: She is alert and oriented to person, place, and time.      Cranial Nerves: Cranial nerves are intact. No cranial nerve deficit.      Sensory: Sensation is intact. No sensory deficit.      Motor: Motor function is intact. No tremor, atrophy or abnormal muscle tone.      Coordination: Coordination is intact. Coordination normal.      Gait: Gait is intact. Gait normal.      Deep Tendon Reflexes: Reflexes are normal and symmetric. Reflexes normal.   Psychiatric:         Attention and Perception: Attention normal.         Mood and Affect: Mood normal.         Behavior: Behavior normal. Behavior is cooperative.         Thought Content: Thought content normal.         Cognition and Memory: Cognition normal.         Judgment: Judgment normal.      Lab Review:        Latest Reference Range & Units 06/10/22 08:54 06/10/22 09:16   WBC 3.90 - 12.70 K/uL  6.19   RBC 4.00 - 5.40 M/uL  5.69 (H)   Hemoglobin 12.0 - 16.0 g/dL  16.9 (H)   Hematocrit 37.0 - 48.5 %  49.6 (H)   MCV 82 - 98 fL  87   MCH 27.0 - 31.0 pg  29.7   MCHC 32.0 - 36.0 g/dL  34.1   RDW 11.5 - 14.5 %  12.2   Platelets 150 - 450 K/uL  288   MPV 9.2 - 12.9 fL  10.7   Gran % 38.0 - 73.0 %  67.7   Lymph % 18.0 - 48.0 %  22.6   Mono % 4.0 - 15.0 %  6.3   Eosinophil % 0.0 - 8.0 %  1.8   Basophil % 0.0 - 1.9 %  1.3   Immature Granulocytes 0.0 - 0.5 %  0.3   Gran # (ANC) 1.8 - 7.7 K/uL  4.2   Lymph # 1.0 - 4.8 K/uL  1.4   Mono # 0.3 - 1.0 K/uL  0.4   Eos # 0.0 - 0.5 K/uL  0.1   Baso # 0.00 - 0.20 K/uL  0.08   Immature Grans (Abs) 0.00 - 0.04 K/uL  0.02   nRBC 0 /100 WBC  0   Differential Method   Automated   Sed Rate 0 - 36 mm/Hr  <2   Sodium 136 - 145 mmol/L  140   Potassium 3.5 - 5.1 mmol/L  4.1   Chloride 95 - 110 mmol/L  106   CO2 23 - 29 mmol/L  23   Anion Gap 8 - 16 mmol/L  11   BUN 6 - 20 mg/dL  13   Creatinine 0.5 - 1.4 mg/dL  1.0   eGFR if non African American >60  mL/min/1.73 m^2  >60.0   eGFR if African American >60 mL/min/1.73 m^2  >60.0   Glucose 70 - 110 mg/dL  75   Calcium 8.7 - 10.5 mg/dL  9.6   Ionized Calcium 1.06 - 1.42 mmol/L  1.34   Alkaline Phosphatase 55 - 135 U/L  31 (L)   PROTEIN TOTAL 6.0 - 8.4 g/dL  6.7   Albumin 3.5 - 5.2 g/dL  4.2   Uric Acid 2.4 - 5.7 mg/dL  4.7   BILIRUBIN TOTAL 0.1 - 1.0 mg/dL  1.3 (H)   AST 10 - 40 U/L  16   ALT 10 - 44 U/L  22   Amylase 20 - 110 U/L  78   Lipase 4 - 60 U/L  53   CRP 0.0 - 8.2 mg/L  1.1   Cholesterol 120 - 199 mg/dL  200 (H)   HDL 40 - 75 mg/dL  74   HDL/Cholesterol Ratio 20.0 - 50.0 %  37.0   LDL Cholesterol External 63.0 - 159.0 mg/dL  111.0   Non-HDL Cholesterol mg/dL  126   Total Cholesterol/HDL Ratio 2.0 - 5.0   2.7   Triglycerides 30 - 150 mg/dL  75   Vit D, 25-Hydroxy 30 - 96 ng/mL  42   Hemoglobin A1C External 4.0 - 5.6 %  4.9   Estimated Avg Glucose 68 - 131 mg/dL  94   TSH 0.400 - 4.000 uIU/mL  1.643   T3, Total 60 - 180 ng/dL  82   Free T4 0.71 - 1.51 ng/dL  0.84   Thyroglobulin Interpretation   SEE BELOW   Thyroglobulin Antibody Screen <1.8 IU/mL  <1.8   Thyroglobulin, Tumor Marker ng/mL  6.2 (H)   PTH 9.0 - 77.0 pg/mL  42.4   Calcitonin <=7.6 pg/mL  <5.0   ESTEBAN Screen Negative <1:80   Negative <1:80   Specimen UA  Urine, Clean Catch    Color, UA Yellow, Straw, Marija  Yellow    Appearance, UA Clear  Clear    Specific Gravity, UA 1.005 - 1.030  >=1.030 !    pH, UA 5.0 - 8.0  7.0    Protein, UA Negative  Negative    Glucose, UA Negative  Negative    Ketones, UA Negative  Negative    Occult Blood UA Negative  Negative    NITRITE UA Negative  Negative    Bilirubin (UA) Negative  Negative    Leukocytes, UA Negative  Negative    Creatinine, Urine 15.0 - 325.0 mg/dL 138.0    Urine Microalbumin ug/mL 12.0    MICROALB/CREAT RATIO 0.0 - 30.0 ug/mg 8.7    (H): Data is abnormally high  (L): Data is abnormally low  !: Data is abnormal  Assessment:     1. Dysmetabolic syndrome  CBC Auto Differential    Comprehensive  Metabolic Panel    Uric Acid    Hemoglobin A1C    Microalbumin/Creatinine Ratio, Urine    Lipase    Calcitonin    Lipid Panel    Amylase    semaglutide (OZEMPIC) 1 mg/dose (4 mg/3 mL)      2. Weight gain  semaglutide (OZEMPIC) 1 mg/dose (4 mg/3 mL)      3. Hypovitaminosis D  CBC Auto Differential    PTH, Intact    Vitamin D    Calcium, Ionized      4. Perimenopause  CBC Auto Differential      5. History of endometriosis        6. History of nephrolithiasis        7. Migraine with aura and without status migrainosus, not intractable        8. Dysglycemia  Hemoglobin A1C    semaglutide (OZEMPIC) 1 mg/dose (4 mg/3 mL)      9. Thyroid function test abnormal  T4, Free    T3    Thyroglobulin    TSH      10. Dyslipidemia  Lipid Panel            Regarding weight gain; To restart Ozempic 1mg wkly.  Regarding dysmetabolic syndrome screening; labs as detailed above.  Regarding history of nephrolithiasis; to continue ensuring copious free water intake.  Regarding possible perimenopausal status; to obtain screening labs as detailed above.  Regarding possible hypovitaminosis D; to check 25 OH Vit D levels and replete as needed.  Regarding chronic migraines; presently well controlled on present multi-agent regimen including topiramate, Beta blockade,  Botox injections and Zolmig prn.  Regarding resting tachycardia; stable, asymptomatic. To continue low dose beta blockade as before.        Plan:       FFup in ~ 6mths.

## 2023-05-11 ENCOUNTER — TELEPHONE (OUTPATIENT)
Dept: RHEUMATOLOGY | Facility: CLINIC | Age: 48
End: 2023-05-11
Payer: COMMERCIAL

## 2023-05-11 NOTE — TELEPHONE ENCOUNTER
----- Message from Kaylie Interiano sent at 5/11/2023 10:10 AM CDT -----  Contact: patient  Type: Needs Medical Advice  Who Called:  patient  Best Call Back Number: [phone] (home)   Additional Information: patient requesting a call back regarding her appt- she will not be able to make it today and wants to change her appt back to 5/31

## 2023-05-11 NOTE — TELEPHONE ENCOUNTER
LMOVM letting pt know that I changed her appt back to 5/31/2023 at 8:30am    Sara Reyna MA  [facility]  5/11/2023

## 2023-05-31 ENCOUNTER — OFFICE VISIT (OUTPATIENT)
Dept: RHEUMATOLOGY | Facility: CLINIC | Age: 48
End: 2023-05-31
Payer: COMMERCIAL

## 2023-05-31 VITALS
BODY MASS INDEX: 31.04 KG/M2 | WEIGHT: 186.31 LBS | DIASTOLIC BLOOD PRESSURE: 82 MMHG | SYSTOLIC BLOOD PRESSURE: 130 MMHG | HEIGHT: 65 IN | HEART RATE: 71 BPM

## 2023-05-31 DIAGNOSIS — D84.9 IMMUNE DEFICIENCY DISORDER: ICD-10-CM

## 2023-05-31 DIAGNOSIS — M17.0 PRIMARY OSTEOARTHRITIS OF BOTH KNEES: ICD-10-CM

## 2023-05-31 DIAGNOSIS — M35.1 MCTD (MIXED CONNECTIVE TISSUE DISEASE): ICD-10-CM

## 2023-05-31 DIAGNOSIS — R73.9 HYPERGLYCEMIA: ICD-10-CM

## 2023-05-31 DIAGNOSIS — R76.8 ANA POSITIVE: ICD-10-CM

## 2023-05-31 DIAGNOSIS — N39.0 URINARY TRACT INFECTION WITHOUT HEMATURIA, SITE UNSPECIFIED: ICD-10-CM

## 2023-05-31 DIAGNOSIS — E06.3 HASHIMOTO'S THYROIDITIS: ICD-10-CM

## 2023-05-31 DIAGNOSIS — R76.8 LOW SERUM IGG2 SUBCLASS LEVEL: ICD-10-CM

## 2023-05-31 DIAGNOSIS — K90.41 NON-CELIAC GLUTEN SENSITIVITY: ICD-10-CM

## 2023-05-31 DIAGNOSIS — R76.8 LOW SERUM IGG4 SUBCLASS LEVEL: ICD-10-CM

## 2023-05-31 DIAGNOSIS — G43.119 INTRACTABLE MIGRAINE WITH AURA WITHOUT STATUS MIGRAINOSUS: ICD-10-CM

## 2023-05-31 DIAGNOSIS — D83.9 CVID (COMMON VARIABLE IMMUNODEFICIENCY): ICD-10-CM

## 2023-05-31 DIAGNOSIS — E09.69 DRUG OR CHEMICAL INDUCED DIABETES MELLITUS WITH OTHER SPECIFIED COMPLICATION, WITHOUT LONG-TERM CURRENT USE OF INSULIN: Primary | ICD-10-CM

## 2023-05-31 PROCEDURE — 99999 PR PBB SHADOW E&M-EST. PATIENT-LVL IV: ICD-10-PCS | Mod: PBBFAC,,, | Performed by: INTERNAL MEDICINE

## 2023-05-31 PROCEDURE — 3079F DIAST BP 80-89 MM HG: CPT | Mod: CPTII,S$GLB,, | Performed by: INTERNAL MEDICINE

## 2023-05-31 PROCEDURE — 3079F PR MOST RECENT DIASTOLIC BLOOD PRESSURE 80-89 MM HG: ICD-10-PCS | Mod: CPTII,S$GLB,, | Performed by: INTERNAL MEDICINE

## 2023-05-31 PROCEDURE — 1160F RVW MEDS BY RX/DR IN RCRD: CPT | Mod: CPTII,S$GLB,, | Performed by: INTERNAL MEDICINE

## 2023-05-31 PROCEDURE — 3075F SYST BP GE 130 - 139MM HG: CPT | Mod: CPTII,S$GLB,, | Performed by: INTERNAL MEDICINE

## 2023-05-31 PROCEDURE — 3075F PR MOST RECENT SYSTOLIC BLOOD PRESS GE 130-139MM HG: ICD-10-PCS | Mod: CPTII,S$GLB,, | Performed by: INTERNAL MEDICINE

## 2023-05-31 PROCEDURE — 99214 OFFICE O/P EST MOD 30 MIN: CPT | Mod: S$GLB,,, | Performed by: INTERNAL MEDICINE

## 2023-05-31 PROCEDURE — 3008F PR BODY MASS INDEX (BMI) DOCUMENTED: ICD-10-PCS | Mod: CPTII,S$GLB,, | Performed by: INTERNAL MEDICINE

## 2023-05-31 PROCEDURE — 1159F MED LIST DOCD IN RCRD: CPT | Mod: CPTII,S$GLB,, | Performed by: INTERNAL MEDICINE

## 2023-05-31 PROCEDURE — 1160F PR REVIEW ALL MEDS BY PRESCRIBER/CLIN PHARMACIST DOCUMENTED: ICD-10-PCS | Mod: CPTII,S$GLB,, | Performed by: INTERNAL MEDICINE

## 2023-05-31 PROCEDURE — 1159F PR MEDICATION LIST DOCUMENTED IN MEDICAL RECORD: ICD-10-PCS | Mod: CPTII,S$GLB,, | Performed by: INTERNAL MEDICINE

## 2023-05-31 PROCEDURE — 3008F BODY MASS INDEX DOCD: CPT | Mod: CPTII,S$GLB,, | Performed by: INTERNAL MEDICINE

## 2023-05-31 PROCEDURE — 99999 PR PBB SHADOW E&M-EST. PATIENT-LVL IV: CPT | Mod: PBBFAC,,, | Performed by: INTERNAL MEDICINE

## 2023-05-31 PROCEDURE — 99214 PR OFFICE/OUTPT VISIT, EST, LEVL IV, 30-39 MIN: ICD-10-PCS | Mod: S$GLB,,, | Performed by: INTERNAL MEDICINE

## 2023-05-31 RX ORDER — AZATHIOPRINE 50 MG/1
50 TABLET ORAL DAILY
Qty: 30 TABLET | Refills: 5 | Status: SHIPPED | OUTPATIENT
Start: 2023-05-31 | End: 2023-11-27

## 2023-05-31 RX ORDER — KETOROLAC TROMETHAMINE 30 MG/ML
60 INJECTION, SOLUTION INTRAMUSCULAR; INTRAVENOUS
Qty: 2 ML | Refills: 3 | Status: SHIPPED | OUTPATIENT
Start: 2023-05-31 | End: 2023-10-23 | Stop reason: SDUPTHER

## 2023-05-31 RX ORDER — NITROFURANTOIN 25; 75 MG/1; MG/1
100 CAPSULE ORAL 2 TIMES DAILY
Qty: 20 CAPSULE | Refills: 0 | Status: SHIPPED | OUTPATIENT
Start: 2023-05-31 | End: 2023-06-12

## 2023-05-31 RX ORDER — SEMAGLUTIDE 1 MG/.5ML
1 INJECTION, SOLUTION SUBCUTANEOUS
Qty: 2 ML | Refills: 11 | Status: SHIPPED | OUTPATIENT
Start: 2023-05-31 | End: 2023-10-12 | Stop reason: SDUPTHER

## 2023-05-31 RX ORDER — THYROID 15 MG/1
15 TABLET ORAL
Qty: 30 TABLET | Refills: 11 | Status: SHIPPED | OUTPATIENT
Start: 2023-05-31 | End: 2024-05-30

## 2023-05-31 RX ORDER — DULOXETIN HYDROCHLORIDE 30 MG/1
30 CAPSULE, DELAYED RELEASE ORAL 2 TIMES DAILY
Qty: 60 CAPSULE | Refills: 5 | Status: SHIPPED | OUTPATIENT
Start: 2023-05-31

## 2023-05-31 RX ORDER — TOPIRAMATE 50 MG/1
50 TABLET, FILM COATED ORAL 2 TIMES DAILY
Qty: 60 TABLET | Refills: 5 | Status: SHIPPED | OUTPATIENT
Start: 2023-05-31

## 2023-05-31 RX ORDER — FLUCONAZOLE 150 MG/1
150 TABLET ORAL DAILY
Qty: 7 TABLET | Refills: 0 | Status: SHIPPED | OUTPATIENT
Start: 2023-05-31 | End: 2023-06-09

## 2023-05-31 RX ORDER — PREGABALIN 25 MG/1
25 CAPSULE ORAL 2 TIMES DAILY
Qty: 60 CAPSULE | Refills: 3 | Status: SHIPPED | OUTPATIENT
Start: 2023-05-31 | End: 2023-10-23 | Stop reason: ALTCHOICE

## 2023-05-31 NOTE — PROGRESS NOTES
Subjective:       Patient ID: Loren Lemus is a 47 y.o. female.    Chief Complaint: Disease Management      Follow up: 47 yo female with CVID, non celiac gluten hypersenitivity, hashimoto's thyroiditis, MCDT  On plaquenil. H/o migraine Patient complains of arthralgias and myalgias for which has been present for a few years. She has lost 5% of her body weight but is unable to  lose any more. She has tried diet exercise and other medications including Alii, adepix and contrave without results.Pain is located in multiple joints, both shoulder(s), both elbow(s), both wrist(s), both MCP(s): 1st, 2nd, 3rd, 4th and 5th, both PIP(s): 1st, 2nd, 3rd, 4th and 5th, both DIP(s): 1st and 2nd, both hip(s), both knee(s) and both MTP(s): 1st, 2nd, 3rd, 4th and 5th, is described as aching, pulsating, shooting and throbbing, and is constant, moderate .  Associated symptoms include: crepitation, decreased range of motion, edema, effusion, tenderness and warmth.      Review of Systems   Constitutional:  Negative for activity change, appetite change, chills, diaphoresis and unexpected weight change.   HENT:  Negative for congestion, dental problem, ear discharge, ear pain, facial swelling, mouth sores, nosebleeds, postnasal drip, rhinorrhea, sinus pressure, sneezing, sore throat, tinnitus and voice change.    Eyes:  Negative for photophobia, pain, discharge, redness and itching.   Respiratory:  Negative for apnea, cough, chest tightness, shortness of breath and wheezing.    Cardiovascular:  Negative for chest pain, palpitations and leg swelling.   Gastrointestinal:  Negative for abdominal distention, abdominal pain, constipation, diarrhea, nausea and vomiting.   Endocrine: Negative for cold intolerance, heat intolerance, polydipsia and polyuria.   Genitourinary:  Negative for decreased urine volume, difficulty urinating, flank pain, frequency, genital sores, hematuria and urgency.   Musculoskeletal:  Negative for arthralgias, back  "pain, gait problem, neck pain and neck stiffness.   Skin:  Negative for pallor, rash and wound.   Allergic/Immunologic: Negative for immunocompromised state.   Neurological:  Negative for dizziness, tremors, weakness and numbness.   Hematological:  Negative for adenopathy. Does not bruise/bleed easily.   Psychiatric/Behavioral:  Negative for sleep disturbance. The patient is not nervous/anxious.        Objective:   /82   Pulse 71   Ht 5' 5.02" (1.652 m)   Wt 84.5 kg (186 lb 4.6 oz)   LMP 11/19/2016   BMI 30.98 kg/m²      Physical Exam   Constitutional: She is oriented to person, place, and time.   HENT:   Head: Normocephalic and atraumatic.   Mouth/Throat: Oropharynx is clear and moist.   Eyes: Pupils are equal, round, and reactive to light.   Neck: No thyromegaly present.   Cardiovascular: Normal rate, regular rhythm and normal heart sounds. Exam reveals no gallop and no friction rub.   No murmur heard.  Pulmonary/Chest: Breath sounds normal. She has no wheezes. She has no rales. She exhibits no tenderness.   Abdominal: There is no abdominal tenderness. There is no rebound and no guarding.   Musculoskeletal:      Right shoulder: Tenderness present.      Left shoulder: Tenderness present.      Right elbow: Normal.      Left elbow: Tenderness present.      Right wrist: Tenderness present.      Left wrist: Tenderness present.      Cervical back: Neck supple.      Right knee: Effusion present. Tenderness present.      Left knee: Effusion present. Tenderness present.   Lymphadenopathy:     She has no cervical adenopathy.   Neurological: She is alert and oriented to person, place, and time. Gait normal.   Skin: Rash noted. There is erythema. There is pallor.   Psychiatric: Mood, memory, affect and judgment normal.   Vitals reviewed.      Right Side Rheumatological Exam     Examination finds the elbow normal.    The patient is tender to palpation of the shoulder, wrist, knee, 1st PIP, 1st MCP, 2nd PIP, 2nd MCP, " 3rd PIP, 3rd MCP, 4th PIP, 4th MCP, 5th PIP and 5th MCP    Shoulder Exam   Tenderness Location: acromion    Range of Motion   Active abduction:  abnormal   Adduction: abnormal  Sensation: normal    Knee Exam   Tenderness Location: medial joint line and LCL  Patellofemoral Crepitus: positive  Effusion: positive  Sensation: normal    Hip Exam   Tenderness Location: posterior and lateral  Sensation: normal    Elbow/Wrist Exam   Tenderness Location: no tenderness  Sensation: normal    Left Side Rheumatological Exam     The patient is tender to palpation of the shoulder, elbow, wrist, knee, 1st PIP, 1st MCP, 2nd PIP, 2nd MCP, 3rd PIP, 3rd MCP, 4th PIP, 4th MCP, 5th PIP and 5th MCP.    Shoulder Exam   Tenderness Location: acromion    Range of Motion   Active abduction:  abnormal   Sensation: normal    Knee Exam   Tenderness Location: lateral joint line and medial joint line    Patellofemoral Crepitus: positive  Effusion: positive  Sensation: normal    Hip Exam   Tenderness Location: posterior and lateral  Sensation: normal    Elbow/Wrist Exam   Sensation: normal      Back/Neck Exam   Tenderness Right paramedian tenderness of the Upper C-Spine, Upper T-Spine, Upper L-Spine and SI Joint.Left paramedian tenderness of the Upper C-Spine, Upper T-Spine, SI Joint and Upper L-Spine.       Results for orders placed or performed in visit on 06/10/22   Urinalysis   Result Value Ref Range    Specimen UA Urine, Clean Catch     Color, UA Yellow Yellow, Straw, Marija    Appearance, UA Clear Clear    pH, UA 7.0 5.0 - 8.0    Specific Gravity, UA >=1.030 (A) 1.005 - 1.030    Protein, UA Negative Negative    Glucose, UA Negative Negative    Ketones, UA Negative Negative    Bilirubin (UA) Negative Negative    Occult Blood UA Negative Negative    Nitrite, UA Negative Negative    Leukocytes, UA Negative Negative   Microalbumin/Creatinine Ratio, Urine   Result Value Ref Range    Microalbumin, Urine 12.0 ug/mL    Creatinine, Urine 138.0 15.0 -  325.0 mg/dL    Microalb/Creat Ratio 8.7 0.0 - 30.0 ug/mg     reviewed labs with patient during this visit         Assessment:       1. 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 BMI 30.0-30.9,adult    3. Hyperglycemia    4. Primary osteoarthritis of both knees    5. MCTD (mixed connective tissue disease)    6. ESTEBAN positive    7. Urinary tract infection without hematuria, site unspecified    8. Hashimoto's thyroiditis    9. CVID (common variable immunodeficiency)    10. Low serum IgG2 subclass level    11. Low serum IgG4 subclass level    12. Non-celiac gluten sensitivity    13. Immune deficiency disorder    14. Intractable migraine with aura without status migrainosus                Plan:       Loren was seen today for disease management.    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    Hyperglycemia  -     semaglutide, weight loss, (WEGOVY) 1 mg/0.5 mL PnIj; Inject 1 mg into the skin every 7 days.  -     Thiopurine Methyltrans (TPMT) Genotyping; Future  -     nitrofurantoin, macrocrystal-monohydrate, (MACROBID) 100 MG capsule; Take 1 capsule (100 mg total) by mouth 2 (two) times daily. for 10 days  -     fluconazole (DIFLUCAN) 150 MG Tab; Take 1 tablet (150 mg total) by mouth once daily. for 7 days  -     Thiopurine Methyltrans (TPMT) Genotyping  -     azaTHIOprine (IMURAN) 50 mg Tab; Take 1 tablet (50 mg total) by mouth once daily.    Primary osteoarthritis of both knees  -     semaglutide, weight loss, (WEGOVY) 1 mg/0.5 mL PnIj; Inject 1 mg into the skin every 7 days.  -     azaTHIOprine (IMURAN) 50 mg Tab; Take 1 tablet (50 mg total) by mouth once daily.  -     pregabalin (LYRICA) 25 MG  capsule; Take 1 capsule (25 mg total) by mouth 2 (two) times daily.  -     topiramate (TOPAMAX) 50 MG tablet; Take 1 tablet (50 mg total) by mouth 2 (two) times daily.  -     thyroid, pork, (ARMOUR THYROID) 15 mg Tab; Take 1 tablet (15 mg total) by mouth daily before breakfast with water  -     ketorolac (TORADOL) 30 mg/mL (1 mL) injection; Inject 2 mLs (60 mg total) into the muscle every 30 days.  -     DULoxetine (CYMBALTA) 30 MG capsule; Take 1 capsule (30 mg total) by mouth 2 (two) times daily.    MCTD (mixed connective tissue disease)  -     Thiopurine Methyltrans (TPMT) Genotyping; Future  -     nitrofurantoin, macrocrystal-monohydrate, (MACROBID) 100 MG capsule; Take 1 capsule (100 mg total) by mouth 2 (two) times daily. for 10 days  -     fluconazole (DIFLUCAN) 150 MG Tab; Take 1 tablet (150 mg total) by mouth once daily. for 7 days  -     Thiopurine Methyltrans (TPMT) Genotyping  -     azaTHIOprine (IMURAN) 50 mg Tab; Take 1 tablet (50 mg total) by mouth once daily.  -     pregabalin (LYRICA) 25 MG capsule; Take 1 capsule (25 mg total) by mouth 2 (two) times daily.  -     topiramate (TOPAMAX) 50 MG tablet; Take 1 tablet (50 mg total) by mouth 2 (two) times daily.  -     thyroid, pork, (ARMOUR THYROID) 15 mg Tab; Take 1 tablet (15 mg total) by mouth daily before breakfast with water  -     ketorolac (TORADOL) 30 mg/mL (1 mL) injection; Inject 2 mLs (60 mg total) into the muscle every 30 days.  -     DULoxetine (CYMBALTA) 30 MG capsule; Take 1 capsule (30 mg total) by mouth 2 (two) times daily.    ESTEBAN positive  -     azaTHIOprine (IMURAN) 50 mg Tab; Take 1 tablet (50 mg total) by mouth once daily.  -     pregabalin (LYRICA) 25 MG capsule; Take 1 capsule (25 mg total) by mouth 2 (two) times daily.  -     topiramate (TOPAMAX) 50 MG tablet; Take 1 tablet (50 mg total) by mouth 2 (two) times daily.  -     thyroid, pork, (ARMOUR THYROID) 15 mg Tab; Take 1 tablet (15 mg total) by mouth daily before breakfast with  water  -     ketorolac (TORADOL) 30 mg/mL (1 mL) injection; Inject 2 mLs (60 mg total) into the muscle every 30 days.  -     DULoxetine (CYMBALTA) 30 MG capsule; Take 1 capsule (30 mg total) by mouth 2 (two) times daily.    Urinary tract infection without hematuria, site unspecified    Hashimoto's thyroiditis  -     pregabalin (LYRICA) 25 MG capsule; Take 1 capsule (25 mg total) by mouth 2 (two) times daily.  -     topiramate (TOPAMAX) 50 MG tablet; Take 1 tablet (50 mg total) by mouth 2 (two) times daily.  -     thyroid, pork, (ARMOUR THYROID) 15 mg Tab; Take 1 tablet (15 mg total) by mouth daily before breakfast with water  -     ketorolac (TORADOL) 30 mg/mL (1 mL) injection; Inject 2 mLs (60 mg total) into the muscle every 30 days.  -     DULoxetine (CYMBALTA) 30 MG capsule; Take 1 capsule (30 mg total) by mouth 2 (two) times daily.    CVID (common variable immunodeficiency)  -     pregabalin (LYRICA) 25 MG capsule; Take 1 capsule (25 mg total) by mouth 2 (two) times daily.  -     topiramate (TOPAMAX) 50 MG tablet; Take 1 tablet (50 mg total) by mouth 2 (two) times daily.  -     thyroid, pork, (ARMOUR THYROID) 15 mg Tab; Take 1 tablet (15 mg total) by mouth daily before breakfast with water  -     ketorolac (TORADOL) 30 mg/mL (1 mL) injection; Inject 2 mLs (60 mg total) into the muscle every 30 days.  -     DULoxetine (CYMBALTA) 30 MG capsule; Take 1 capsule (30 mg total) by mouth 2 (two) times daily.    Low serum IgG2 subclass level  -     pregabalin (LYRICA) 25 MG capsule; Take 1 capsule (25 mg total) by mouth 2 (two) times daily.  -     topiramate (TOPAMAX) 50 MG tablet; Take 1 tablet (50 mg total) by mouth 2 (two) times daily.  -     thyroid, pork, (ARMOUR THYROID) 15 mg Tab; Take 1 tablet (15 mg total) by mouth daily before breakfast with water  -     ketorolac (TORADOL) 30 mg/mL (1 mL) injection; Inject 2 mLs (60 mg total) into the muscle every 30 days.  -     DULoxetine (CYMBALTA) 30 MG capsule; Take 1  capsule (30 mg total) by mouth 2 (two) times daily.    Low serum IgG4 subclass level  -     pregabalin (LYRICA) 25 MG capsule; Take 1 capsule (25 mg total) by mouth 2 (two) times daily.  -     topiramate (TOPAMAX) 50 MG tablet; Take 1 tablet (50 mg total) by mouth 2 (two) times daily.  -     thyroid, pork, (ARMOUR THYROID) 15 mg Tab; Take 1 tablet (15 mg total) by mouth daily before breakfast with water  -     ketorolac (TORADOL) 30 mg/mL (1 mL) injection; Inject 2 mLs (60 mg total) into the muscle every 30 days.  -     DULoxetine (CYMBALTA) 30 MG capsule; Take 1 capsule (30 mg total) by mouth 2 (two) times daily.    Non-celiac gluten sensitivity  -     pregabalin (LYRICA) 25 MG capsule; Take 1 capsule (25 mg total) by mouth 2 (two) times daily.  -     topiramate (TOPAMAX) 50 MG tablet; Take 1 tablet (50 mg total) by mouth 2 (two) times daily.  -     thyroid, pork, (ARMOUR THYROID) 15 mg Tab; Take 1 tablet (15 mg total) by mouth daily before breakfast with water  -     ketorolac (TORADOL) 30 mg/mL (1 mL) injection; Inject 2 mLs (60 mg total) into the muscle every 30 days.  -     DULoxetine (CYMBALTA) 30 MG capsule; Take 1 capsule (30 mg total) by mouth 2 (two) times daily.    Immune deficiency disorder  -     pregabalin (LYRICA) 25 MG capsule; Take 1 capsule (25 mg total) by mouth 2 (two) times daily.  -     topiramate (TOPAMAX) 50 MG tablet; Take 1 tablet (50 mg total) by mouth 2 (two) times daily.  -     thyroid, pork, (ARMOUR THYROID) 15 mg Tab; Take 1 tablet (15 mg total) by mouth daily before breakfast with water  -     ketorolac (TORADOL) 30 mg/mL (1 mL) injection; Inject 2 mLs (60 mg total) into the muscle every 30 days.  -     DULoxetine (CYMBALTA) 30 MG capsule; Take 1 capsule (30 mg total) by mouth 2 (two) times daily.    Intractable migraine with aura without status migrainosus  -     ketorolac (TORADOL) 30 mg/mL (1 mL) injection; Inject 2 mLs (60 mg total) into the muscle every 30 days.          1. Add   NP thyroid 15 mg  2. Dc plaquenil with start imuran after labs  3. Prn  flare      More than 50% of the  33 minute encounter was spent face to face counseling the patient regarding current status and future plan of care as well as side effects  of the medications. All questions were answered to patient's satisfaction also includes  non-face to face time preparing to see the patient (eg, review of tests), Obtaining and/or reviewing separately obtained history, Documenting clinical information in the electronic or other health record, Independently interpreting results

## 2023-06-01 ENCOUNTER — TELEPHONE (OUTPATIENT)
Dept: PHARMACY | Facility: CLINIC | Age: 48
End: 2023-06-01
Payer: COMMERCIAL

## 2023-06-02 ENCOUNTER — PATIENT MESSAGE (OUTPATIENT)
Dept: RHEUMATOLOGY | Facility: CLINIC | Age: 48
End: 2023-06-02
Payer: COMMERCIAL

## 2023-06-19 ENCOUNTER — PATIENT MESSAGE (OUTPATIENT)
Dept: RHEUMATOLOGY | Facility: CLINIC | Age: 48
End: 2023-06-19
Payer: COMMERCIAL

## 2023-06-28 ENCOUNTER — TELEPHONE (OUTPATIENT)
Dept: RHEUMATOLOGY | Facility: CLINIC | Age: 48
End: 2023-06-28
Payer: COMMERCIAL

## 2023-06-28 NOTE — TELEPHONE ENCOUNTER
----- Message from Bobby Maolne sent at 6/27/2023  8:15 AM CDT -----  Regarding: refill  Type:  RX Refill Request    Who Called:  pt  Refill or New Rx:  refill  RX Name and Strength:  semaglutide, weight loss, (WEGOVY) 1 mg/0.5 mL PnIj 2 mL 11 5/31/2023 5/30/2024   Sig - Route: Inject 1 mg into the skin every 7 days. - Subcutaneous   Sent to pharmacy as: semaglutide, weight loss, (WEGOVY) 1 mg/0.5 mL PnIj       How is the patient currently taking it? (ex. 1XDay):  see above  Is this a 30 day or 90 day RX:  see above  Preferred Pharmacy with phone number:      [facility]  [address], [place] [address]  Phone: [phone]      Local or Mail Order:  ken  Ordering Provider:  Grady Gaming Call Back Number:  [phone]    Additional Information:  pt states that she's been trying to get in touch with office for a couple of weeks now. In have not gotten a response. Please call to discuss.

## 2023-09-08 ENCOUNTER — PATIENT MESSAGE (OUTPATIENT)
Dept: RHEUMATOLOGY | Facility: CLINIC | Age: 48
End: 2023-09-08
Payer: COMMERCIAL

## 2023-09-08 ENCOUNTER — TELEPHONE (OUTPATIENT)
Dept: NEUROLOGY | Facility: CLINIC | Age: 48
End: 2023-09-08
Payer: COMMERCIAL

## 2023-09-08 NOTE — TELEPHONE ENCOUNTER
----- Message from Pita Foster sent at 9/8/2023  2:22 PM CDT -----  Regarding: Appointment Request  Contact: patient at [phone]  Type:  Appointment Request    Name of Caller:  patient at [phone]    Additional Information:  calling to see if she could schedule a botox appointment that she had to cancel previously because of insurance. Please call and advise. Thank you

## 2023-09-11 ENCOUNTER — PATIENT MESSAGE (OUTPATIENT)
Dept: RHEUMATOLOGY | Facility: CLINIC | Age: 48
End: 2023-09-11
Payer: COMMERCIAL

## 2023-09-12 ENCOUNTER — TELEPHONE (OUTPATIENT)
Dept: RHEUMATOLOGY | Facility: CLINIC | Age: 48
End: 2023-09-12
Payer: COMMERCIAL

## 2023-09-12 DIAGNOSIS — Z79.624 ENCOUNTER FOR LONG TERM CURRENT USE OF AZATHIOPRINE: Primary | ICD-10-CM

## 2023-09-12 NOTE — TELEPHONE ENCOUNTER
----- Message from Radha Guillen sent at 9/11/2023  8:15 AM CDT -----  Contact: Patient  Type:  RX Refill Request    Who Called:  Self  Refill or New Rx:  New Rx  RX Name and Strength:  I have been trying to get the TPMT blood test covered with insurance and they are refusing. The test is over $450 to have done. As of today, I still have not been able to take Imuran and  having more and more difficulty with my Lupus and RA. Please advise asap.     She is wanting the injectable instead sent over.  Preferred Pharmacy with phone number:    [facility]  [address] [place] [address]  Phone: [phone] Fax: [phone]  Local or Mail Order:  Local  Ordering Provider:  Grady Gaming Call Back Number:     Loren   [phone]  Additional Information:  Please call back to advise. Thanks!

## 2023-10-12 DIAGNOSIS — M17.0 PRIMARY OSTEOARTHRITIS OF BOTH KNEES: ICD-10-CM

## 2023-10-12 DIAGNOSIS — R73.9 HYPERGLYCEMIA: ICD-10-CM

## 2023-10-16 RX ORDER — SEMAGLUTIDE 1 MG/.5ML
1 INJECTION, SOLUTION SUBCUTANEOUS
Qty: 2 ML | Refills: 11 | Status: SHIPPED | OUTPATIENT
Start: 2023-10-16 | End: 2023-10-18

## 2023-10-18 ENCOUNTER — PATIENT MESSAGE (OUTPATIENT)
Dept: RHEUMATOLOGY | Facility: CLINIC | Age: 48
End: 2023-10-18
Payer: COMMERCIAL

## 2023-10-18 RX ORDER — SEMAGLUTIDE 1.7 MG/.75ML
1.7 INJECTION, SOLUTION SUBCUTANEOUS
Qty: 2 ML | Refills: 11 | Status: SHIPPED | OUTPATIENT
Start: 2023-10-18 | End: 2024-02-07

## 2023-10-23 ENCOUNTER — OFFICE VISIT (OUTPATIENT)
Dept: NEUROLOGY | Facility: CLINIC | Age: 48
End: 2023-10-23
Payer: COMMERCIAL

## 2023-10-23 VITALS
WEIGHT: 198.63 LBS | SYSTOLIC BLOOD PRESSURE: 113 MMHG | RESPIRATION RATE: 17 BRPM | DIASTOLIC BLOOD PRESSURE: 81 MMHG | BODY MASS INDEX: 29.42 KG/M2 | HEIGHT: 69 IN | HEART RATE: 111 BPM | TEMPERATURE: 98 F

## 2023-10-23 DIAGNOSIS — R00.0 TACHYCARDIA, UNSPECIFIED: ICD-10-CM

## 2023-10-23 DIAGNOSIS — R07.89 MUSCULOSKELETAL CHEST PAIN: ICD-10-CM

## 2023-10-23 DIAGNOSIS — G43.119 INTRACTABLE MIGRAINE WITH AURA WITHOUT STATUS MIGRAINOSUS: ICD-10-CM

## 2023-10-23 DIAGNOSIS — R76.8 LOW SERUM IGG4 SUBCLASS LEVEL: ICD-10-CM

## 2023-10-23 DIAGNOSIS — K90.41 NON-CELIAC GLUTEN SENSITIVITY: ICD-10-CM

## 2023-10-23 DIAGNOSIS — R76.8 ANA POSITIVE: ICD-10-CM

## 2023-10-23 DIAGNOSIS — E06.3 HASHIMOTO'S THYROIDITIS: ICD-10-CM

## 2023-10-23 DIAGNOSIS — D84.9 IMMUNE DEFICIENCY DISORDER: ICD-10-CM

## 2023-10-23 DIAGNOSIS — M35.1 MCTD (MIXED CONNECTIVE TISSUE DISEASE): ICD-10-CM

## 2023-10-23 DIAGNOSIS — N95.1 PERIMENOPAUSE: ICD-10-CM

## 2023-10-23 DIAGNOSIS — R00.2 PALPITATIONS: ICD-10-CM

## 2023-10-23 DIAGNOSIS — D83.9 CVID (COMMON VARIABLE IMMUNODEFICIENCY): ICD-10-CM

## 2023-10-23 DIAGNOSIS — G43.711 INTRACTABLE CHRONIC MIGRAINE WITHOUT AURA AND WITH STATUS MIGRAINOSUS: Primary | ICD-10-CM

## 2023-10-23 DIAGNOSIS — R76.8 LOW SERUM IGG2 SUBCLASS LEVEL: ICD-10-CM

## 2023-10-23 DIAGNOSIS — M17.0 PRIMARY OSTEOARTHRITIS OF BOTH KNEES: ICD-10-CM

## 2023-10-23 DIAGNOSIS — M47.812 ARTHROPATHY OF CERVICAL FACET JOINT: ICD-10-CM

## 2023-10-23 PROCEDURE — 3008F PR BODY MASS INDEX (BMI) DOCUMENTED: ICD-10-PCS | Mod: CPTII,S$GLB,, | Performed by: PSYCHIATRY & NEUROLOGY

## 2023-10-23 PROCEDURE — 1159F PR MEDICATION LIST DOCUMENTED IN MEDICAL RECORD: ICD-10-PCS | Mod: CPTII,S$GLB,, | Performed by: PSYCHIATRY & NEUROLOGY

## 2023-10-23 PROCEDURE — 3074F PR MOST RECENT SYSTOLIC BLOOD PRESSURE < 130 MM HG: ICD-10-PCS | Mod: CPTII,S$GLB,, | Performed by: PSYCHIATRY & NEUROLOGY

## 2023-10-23 PROCEDURE — 99999 PR PBB SHADOW E&M-EST. PATIENT-LVL IV: ICD-10-PCS | Mod: PBBFAC,,, | Performed by: PSYCHIATRY & NEUROLOGY

## 2023-10-23 PROCEDURE — 99214 PR OFFICE/OUTPT VISIT, EST, LEVL IV, 30-39 MIN: ICD-10-PCS | Mod: S$GLB,,, | Performed by: PSYCHIATRY & NEUROLOGY

## 2023-10-23 PROCEDURE — 3074F SYST BP LT 130 MM HG: CPT | Mod: CPTII,S$GLB,, | Performed by: PSYCHIATRY & NEUROLOGY

## 2023-10-23 PROCEDURE — 3079F PR MOST RECENT DIASTOLIC BLOOD PRESSURE 80-89 MM HG: ICD-10-PCS | Mod: CPTII,S$GLB,, | Performed by: PSYCHIATRY & NEUROLOGY

## 2023-10-23 PROCEDURE — 3079F DIAST BP 80-89 MM HG: CPT | Mod: CPTII,S$GLB,, | Performed by: PSYCHIATRY & NEUROLOGY

## 2023-10-23 PROCEDURE — 99214 OFFICE O/P EST MOD 30 MIN: CPT | Mod: S$GLB,,, | Performed by: PSYCHIATRY & NEUROLOGY

## 2023-10-23 PROCEDURE — 1159F MED LIST DOCD IN RCRD: CPT | Mod: CPTII,S$GLB,, | Performed by: PSYCHIATRY & NEUROLOGY

## 2023-10-23 PROCEDURE — 99999 PR PBB SHADOW E&M-EST. PATIENT-LVL IV: CPT | Mod: PBBFAC,,, | Performed by: PSYCHIATRY & NEUROLOGY

## 2023-10-23 PROCEDURE — 3008F BODY MASS INDEX DOCD: CPT | Mod: CPTII,S$GLB,, | Performed by: PSYCHIATRY & NEUROLOGY

## 2023-10-23 RX ORDER — UBROGEPANT 100 MG/1
TABLET ORAL
Qty: 16 TABLET | Refills: 2 | Status: SHIPPED | OUTPATIENT
Start: 2023-10-23

## 2023-10-23 RX ORDER — NAPROXEN SODIUM 550 MG/1
550 TABLET ORAL 2 TIMES DAILY
Qty: 60 TABLET | Refills: 2 | Status: SHIPPED | OUTPATIENT
Start: 2023-10-23

## 2023-10-23 RX ORDER — ZOLMITRIPTAN 5 MG/1
SPRAY NASAL
Qty: 6 EACH | Refills: 5 | Status: SHIPPED | OUTPATIENT
Start: 2023-10-23

## 2023-10-23 RX ORDER — KETOROLAC TROMETHAMINE 30 MG/ML
60 INJECTION, SOLUTION INTRAMUSCULAR; INTRAVENOUS
Qty: 2 ML | Refills: 3 | Status: SHIPPED | OUTPATIENT
Start: 2023-10-23 | End: 2024-10-22

## 2023-10-23 RX ORDER — ERENUMAB-AOOE 140 MG/ML
140 INJECTION, SOLUTION SUBCUTANEOUS
Qty: 1 ML | Refills: 11 | Status: SHIPPED | OUTPATIENT
Start: 2023-10-23
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
## 2023-11-17 ENCOUNTER — PROCEDURE VISIT (OUTPATIENT)
Dept: NEUROLOGY | Facility: CLINIC | Age: 48
End: 2023-11-17
Payer: COMMERCIAL

## 2023-11-17 VITALS
DIASTOLIC BLOOD PRESSURE: 87 MMHG | HEIGHT: 69 IN | SYSTOLIC BLOOD PRESSURE: 120 MMHG | TEMPERATURE: 98 F | HEART RATE: 99 BPM | RESPIRATION RATE: 17 BRPM | BODY MASS INDEX: 29.33 KG/M2

## 2023-11-17 DIAGNOSIS — G43.711 INTRACTABLE CHRONIC MIGRAINE WITHOUT AURA AND WITH STATUS MIGRAINOSUS: Primary | ICD-10-CM

## 2023-11-17 PROCEDURE — 64615 CHEMODENERV MUSC MIGRAINE: CPT | Mod: S$GLB,,, | Performed by: PSYCHIATRY & NEUROLOGY

## 2023-11-17 PROCEDURE — 64615 PR CHEMODENERVATION OF MUSCLE FOR CHRONIC MIGRAINE: ICD-10-PCS | Mod: S$GLB,,, | Performed by: PSYCHIATRY & NEUROLOGY

## 2023-11-17 NOTE — PROCEDURES
ProceduresBOTOX PROCEDURE    PROCEDURE PERFORMED: Botulinum toxin injection (80416)    CLINICAL INDICATION: G43.719    A time out was conducted just before the start of the procedure to verify the correct patient and procedure, procedure location, and all relevant critical information.   The patient meets criteria for chronic headaches according to the ICHD-II, the patient has more than 15 headaches a month out of at least 8 are migraines which last for more than 4 hours a day.    The Botox injections have achieved well over 50%  improvement in the patient's symptoms. Migraines have been reduced at least 7 days per month and the number of cumulative hours suffering with headaches has been reduced at least 100 total hours per month. Today she does have a headache indicating that the Botox has worn off. Frequency of treatment is every 3 months unless no response to the treatments, at which time we will discontinue the injections.      DESCRIPTION OF PROCEDURE: After obtaining informed consent and under aseptic technique, a total of 185 units of botulinum toxin type A were injected in the following muscles: Procerus 5 units,  5 units bilaterally, frontalis 20 units, temporalis 20 units bilaterally, Masseters 15 units bilaterally (3 sites, 5 units per site), occipitalis 15 units, upper cervical paraspinals 10 units bilaterally and trapezius 15 units bilaterally. The patient was given a total of 185 units in 37 sites.The patient tolerated the procedure well. There were no complications. The patient was given a prescription for repeat treatment in 3 months.     Unavoidable waste 15 units      Mitchell Hill M.D  Medical Director, Headache and Facial Pain  [facility]

## 2024-02-07 ENCOUNTER — LAB VISIT (OUTPATIENT)
Dept: LAB | Facility: HOSPITAL | Age: 49
End: 2024-02-07
Attending: INTERNAL MEDICINE
Payer: COMMERCIAL

## 2024-02-07 ENCOUNTER — OFFICE VISIT (OUTPATIENT)
Dept: RHEUMATOLOGY | Facility: CLINIC | Age: 49
End: 2024-02-07
Payer: COMMERCIAL

## 2024-02-07 VITALS
SYSTOLIC BLOOD PRESSURE: 136 MMHG | WEIGHT: 200 LBS | BODY MASS INDEX: 29.53 KG/M2 | DIASTOLIC BLOOD PRESSURE: 89 MMHG | HEART RATE: 101 BPM

## 2024-02-07 DIAGNOSIS — T38.0X5S STEROID-INDUCED DIABETES MELLITUS, SEQUELA: ICD-10-CM

## 2024-02-07 DIAGNOSIS — Z79.624 ENCOUNTER FOR LONG TERM CURRENT USE OF AZATHIOPRINE: ICD-10-CM

## 2024-02-07 DIAGNOSIS — M35.1 MCTD (MIXED CONNECTIVE TISSUE DISEASE): ICD-10-CM

## 2024-02-07 DIAGNOSIS — D83.9 CVID (COMMON VARIABLE IMMUNODEFICIENCY): ICD-10-CM

## 2024-02-07 DIAGNOSIS — E09.9 STEROID-INDUCED DIABETES MELLITUS, SEQUELA: ICD-10-CM

## 2024-02-07 DIAGNOSIS — R73.9 HYPERGLYCEMIA: ICD-10-CM

## 2024-02-07 DIAGNOSIS — E06.3 HASHIMOTO'S THYROIDITIS: ICD-10-CM

## 2024-02-07 DIAGNOSIS — G43.119 INTRACTABLE MIGRAINE WITH AURA WITHOUT STATUS MIGRAINOSUS: ICD-10-CM

## 2024-02-07 DIAGNOSIS — K90.41 NON-CELIAC GLUTEN SENSITIVITY: ICD-10-CM

## 2024-02-07 DIAGNOSIS — L40.50 PSA (PSORIATIC ARTHRITIS): ICD-10-CM

## 2024-02-07 DIAGNOSIS — G25.81 RESTLESS LEGS: ICD-10-CM

## 2024-02-07 DIAGNOSIS — E09.9 STEROID-INDUCED DIABETES MELLITUS, SEQUELA: Primary | ICD-10-CM

## 2024-02-07 DIAGNOSIS — T38.0X5S STEROID-INDUCED DIABETES MELLITUS, SEQUELA: Primary | ICD-10-CM

## 2024-02-07 DIAGNOSIS — H20.9 IRITIS: ICD-10-CM

## 2024-02-07 LAB
ALBUMIN SERPL BCP-MCNC: 4.1 G/DL (ref 3.5–5.2)
ALP SERPL-CCNC: 47 U/L (ref 55–135)
ALT SERPL W/O P-5'-P-CCNC: 24 U/L (ref 10–44)
ANION GAP SERPL CALC-SCNC: 9 MMOL/L (ref 8–16)
AST SERPL-CCNC: 17 U/L (ref 10–40)
BASOPHILS # BLD AUTO: 0.1 K/UL (ref 0–0.2)
BASOPHILS NFR BLD: 1.2 % (ref 0–1.9)
BILIRUB SERPL-MCNC: 1.1 MG/DL (ref 0.1–1)
BUN SERPL-MCNC: 6 MG/DL (ref 6–20)
CALCIUM SERPL-MCNC: 9.3 MG/DL (ref 8.7–10.5)
CHLORIDE SERPL-SCNC: 107 MMOL/L (ref 95–110)
CO2 SERPL-SCNC: 22 MMOL/L (ref 23–29)
CREAT SERPL-MCNC: 0.8 MG/DL (ref 0.5–1.4)
CRP SERPL-MCNC: 1.6 MG/L (ref 0–8.2)
DIFFERENTIAL METHOD BLD: NORMAL
EOSINOPHIL # BLD AUTO: 0.3 K/UL (ref 0–0.5)
EOSINOPHIL NFR BLD: 4 % (ref 0–8)
ERYTHROCYTE [DISTWIDTH] IN BLOOD BY AUTOMATED COUNT: 13 % (ref 11.5–14.5)
ERYTHROCYTE [SEDIMENTATION RATE] IN BLOOD BY PHOTOMETRIC METHOD: <2 MM/HR (ref 0–36)
EST. GFR  (NO RACE VARIABLE): >60 ML/MIN/1.73 M^2
FSH SERPL-ACNC: 6.08 MIU/ML
GLUCOSE SERPL-MCNC: 94 MG/DL (ref 70–110)
HCT VFR BLD AUTO: 46.9 % (ref 37–48.5)
HGB BLD-MCNC: 15.7 G/DL (ref 12–16)
IMM GRANULOCYTES # BLD AUTO: 0.03 K/UL (ref 0–0.04)
IMM GRANULOCYTES NFR BLD AUTO: 0.4 % (ref 0–0.5)
LH SERPL-ACNC: 3.3 MIU/ML
LYMPHOCYTES # BLD AUTO: 1.8 K/UL (ref 1–4.8)
LYMPHOCYTES NFR BLD: 22.2 % (ref 18–48)
MCH RBC QN AUTO: 29.2 PG (ref 27–31)
MCHC RBC AUTO-ENTMCNC: 33.5 G/DL (ref 32–36)
MCV RBC AUTO: 87 FL (ref 82–98)
MONOCYTES # BLD AUTO: 0.5 K/UL (ref 0.3–1)
MONOCYTES NFR BLD: 5.8 % (ref 4–15)
NEUTROPHILS # BLD AUTO: 5.4 K/UL (ref 1.8–7.7)
NEUTROPHILS NFR BLD: 66.4 % (ref 38–73)
NRBC BLD-RTO: 0 /100 WBC
PLATELET # BLD AUTO: 329 K/UL (ref 150–450)
PMV BLD AUTO: 10.8 FL (ref 9.2–12.9)
POTASSIUM SERPL-SCNC: 3.5 MMOL/L (ref 3.5–5.1)
PROGEST SERPL-MCNC: 0.1 NG/ML
PROT SERPL-MCNC: 6.9 G/DL (ref 6–8.4)
RBC # BLD AUTO: 5.37 M/UL (ref 4–5.4)
SODIUM SERPL-SCNC: 138 MMOL/L (ref 136–145)
T3FREE SERPL-MCNC: 2.7 PG/ML (ref 2.3–4.2)
T4 FREE SERPL-MCNC: 0.79 NG/DL (ref 0.71–1.51)
TSH SERPL DL<=0.005 MIU/L-ACNC: 0.72 UIU/ML (ref 0.4–4)
WBC # BLD AUTO: 8.16 K/UL (ref 3.9–12.7)

## 2024-02-07 PROCEDURE — 96372 THER/PROPH/DIAG INJ SC/IM: CPT | Mod: S$GLB,,, | Performed by: INTERNAL MEDICINE

## 2024-02-07 PROCEDURE — 86803 HEPATITIS C AB TEST: CPT | Performed by: INTERNAL MEDICINE

## 2024-02-07 PROCEDURE — 0034U TPMT NUDT15 GENES: CPT | Performed by: INTERNAL MEDICINE

## 2024-02-07 PROCEDURE — 85652 RBC SED RATE AUTOMATED: CPT | Performed by: INTERNAL MEDICINE

## 2024-02-07 PROCEDURE — 99215 OFFICE O/P EST HI 40 MIN: CPT | Mod: 25,S$GLB,, | Performed by: INTERNAL MEDICINE

## 2024-02-07 PROCEDURE — 87340 HEPATITIS B SURFACE AG IA: CPT | Performed by: INTERNAL MEDICINE

## 2024-02-07 PROCEDURE — 3008F BODY MASS INDEX DOCD: CPT | Mod: CPTII,S$GLB,, | Performed by: INTERNAL MEDICINE

## 2024-02-07 PROCEDURE — 82626 DEHYDROEPIANDROSTERONE: CPT | Performed by: INTERNAL MEDICINE

## 2024-02-07 PROCEDURE — 86706 HEP B SURFACE ANTIBODY: CPT | Performed by: INTERNAL MEDICINE

## 2024-02-07 PROCEDURE — 86704 HEP B CORE ANTIBODY TOTAL: CPT | Performed by: INTERNAL MEDICINE

## 2024-02-07 PROCEDURE — 84481 FREE ASSAY (FT-3): CPT | Performed by: INTERNAL MEDICINE

## 2024-02-07 PROCEDURE — 3079F DIAST BP 80-89 MM HG: CPT | Mod: CPTII,S$GLB,, | Performed by: INTERNAL MEDICINE

## 2024-02-07 PROCEDURE — 36415 COLL VENOUS BLD VENIPUNCTURE: CPT | Mod: PO | Performed by: INTERNAL MEDICINE

## 2024-02-07 PROCEDURE — 80053 COMPREHEN METABOLIC PANEL: CPT | Performed by: INTERNAL MEDICINE

## 2024-02-07 PROCEDURE — 83001 ASSAY OF GONADOTROPIN (FSH): CPT | Performed by: INTERNAL MEDICINE

## 2024-02-07 PROCEDURE — 84443 ASSAY THYROID STIM HORMONE: CPT | Performed by: INTERNAL MEDICINE

## 2024-02-07 PROCEDURE — 86140 C-REACTIVE PROTEIN: CPT | Performed by: INTERNAL MEDICINE

## 2024-02-07 PROCEDURE — 82672 ASSAY OF ESTROGEN: CPT | Performed by: INTERNAL MEDICINE

## 2024-02-07 PROCEDURE — 84144 ASSAY OF PROGESTERONE: CPT | Performed by: INTERNAL MEDICINE

## 2024-02-07 PROCEDURE — 84403 ASSAY OF TOTAL TESTOSTERONE: CPT | Performed by: INTERNAL MEDICINE

## 2024-02-07 PROCEDURE — 84439 ASSAY OF FREE THYROXINE: CPT | Performed by: INTERNAL MEDICINE

## 2024-02-07 PROCEDURE — 3075F SYST BP GE 130 - 139MM HG: CPT | Mod: CPTII,S$GLB,, | Performed by: INTERNAL MEDICINE

## 2024-02-07 PROCEDURE — 85025 COMPLETE CBC W/AUTO DIFF WBC: CPT | Performed by: INTERNAL MEDICINE

## 2024-02-07 PROCEDURE — 1160F RVW MEDS BY RX/DR IN RCRD: CPT | Mod: CPTII,S$GLB,, | Performed by: INTERNAL MEDICINE

## 2024-02-07 PROCEDURE — 83002 ASSAY OF GONADOTROPIN (LH): CPT | Performed by: INTERNAL MEDICINE

## 2024-02-07 PROCEDURE — 86706 HEP B SURFACE ANTIBODY: CPT | Mod: 91 | Performed by: INTERNAL MEDICINE

## 2024-02-07 PROCEDURE — 1159F MED LIST DOCD IN RCRD: CPT | Mod: CPTII,S$GLB,, | Performed by: INTERNAL MEDICINE

## 2024-02-07 PROCEDURE — 99999 PR PBB SHADOW E&M-EST. PATIENT-LVL III: CPT | Mod: PBBFAC,,, | Performed by: INTERNAL MEDICINE

## 2024-02-07 RX ORDER — METHOTREXATE 25 MG/ML
12.5 INJECTION, SOLUTION INTRA-ARTERIAL; INTRAMUSCULAR; INTRAVENOUS
Qty: 2 ML | Refills: 3 | Status: ACTIVE | OUTPATIENT
Start: 2024-02-07 | End: 2024-04-05 | Stop reason: CLARIF

## 2024-02-07 RX ORDER — KETOROLAC TROMETHAMINE 30 MG/ML
60 INJECTION, SOLUTION INTRAMUSCULAR; INTRAVENOUS
Status: COMPLETED | OUTPATIENT
Start: 2024-02-07 | End: 2024-02-07

## 2024-02-07 RX ORDER — TIRZEPATIDE 5 MG/.5ML
5 INJECTION, SOLUTION SUBCUTANEOUS
Qty: 4 PEN | Refills: 2 | Status: SHIPPED | OUTPATIENT
Start: 2024-02-07 | End: 2024-05-07

## 2024-02-07 RX ORDER — PRAMIPEXOLE DIHYDROCHLORIDE 0.12 MG/1
0.25 TABLET ORAL NIGHTLY
Qty: 60 TABLET | Refills: 11 | Status: SHIPPED | OUTPATIENT
Start: 2024-02-07 | End: 2025-02-06

## 2024-02-07 RX ORDER — TIRZEPATIDE 2.5 MG/.5ML
2.5 INJECTION, SOLUTION SUBCUTANEOUS
Qty: 4 PEN | Refills: 2 | Status: SHIPPED | OUTPATIENT
Start: 2024-02-07 | End: 2024-05-07

## 2024-02-07 RX ORDER — NEEDLES, DISPOSABLE 27GX1/2"
1 NEEDLE, DISPOSABLE MISCELLANEOUS
Qty: 25 EACH | Refills: 2 | Status: SHIPPED | OUTPATIENT
Start: 2024-02-07 | End: 2024-08-05

## 2024-02-07 RX ORDER — METHYLPREDNISOLONE ACETATE 80 MG/ML
160 INJECTION, SUSPENSION INTRA-ARTICULAR; INTRALESIONAL; INTRAMUSCULAR; SOFT TISSUE
Status: COMPLETED | OUTPATIENT
Start: 2024-02-07 | End: 2024-02-07

## 2024-02-07 RX ORDER — CYANOCOBALAMIN 1000 UG/ML
1000 INJECTION, SOLUTION INTRAMUSCULAR; SUBCUTANEOUS
Status: COMPLETED | OUTPATIENT
Start: 2024-02-07 | End: 2024-02-07

## 2024-02-07 RX ORDER — GENTAMICIN SULFATE 3 MG/ML
1 SOLUTION/ DROPS OPHTHALMIC EVERY 4 HOURS
Qty: 5 ML | Refills: 2 | Status: SHIPPED | OUTPATIENT
Start: 2024-02-07 | End: 2024-02-17

## 2024-02-07 RX ADMIN — METHYLPREDNISOLONE ACETATE 160 MG: 80 INJECTION, SUSPENSION INTRA-ARTICULAR; INTRALESIONAL; INTRAMUSCULAR; SOFT TISSUE at 01:02

## 2024-02-07 RX ADMIN — KETOROLAC TROMETHAMINE 60 MG: 30 INJECTION, SOLUTION INTRAMUSCULAR; INTRAVENOUS at 01:02

## 2024-02-07 RX ADMIN — CYANOCOBALAMIN 1000 MCG: 1000 INJECTION, SOLUTION INTRAMUSCULAR; SUBCUTANEOUS at 01:02

## 2024-02-07 NOTE — PROGRESS NOTES
Subjective:     Patient ID:  Loren Lemus    Chief Complaint:  Disease Management     History of Present Illness:  Follow up: 47 yo female with CVID, non celiac gluten hypersenitivity, hashimoto's thyroiditis, MCDT dc plaquenil. H/o migraine Patient complains of arthralgias and myalgias for which has been present for a few years. She has lost 5% of her body weight but is unable to  lose any more. She has tried diet exercise and other medications including Alii, adepix and contrave without results.Pain is located in multiple joints, both shoulder(s), both elbow(s), both wrist(s), both MCP(s): 1st, 2nd, 3rd, 4th and 5th, both PIP(s): 1st, 2nd, 3rd, 4th and 5th, both DIP(s): 1st and 2nd, both hip(s), both knee(s) and both MTP(s): 1st, 2nd, 3rd, 4th and 5th, is described as aching, pulsating, shooting and throbbing, and is constant, moderate .  Associated symptoms include: crepitation, decreased range of motion, edema, effusion, tenderness and warmth.       Rheumatologic History:   - Diagnosis/es:  - Positive serologies:  - Infectious screening labs:  - Previous Treatments:  - Current Treatments:     Interval History:   Hospitalization since last office visit: No    Patient Active Problem List    Diagnosis Date Noted    Hypovitaminosis D 03/27/2021    Perimenopause 03/27/2021    Dysglycemia 03/26/2021    Weight gain 03/26/2021    Dysmetabolic syndrome 03/26/2021    History of endometriosis 03/26/2021    History of nephrolithiasis 03/26/2021    History of hysterectomy 03/26/2021    Tachycardia, unspecified 03/26/2021    Contusion of left wrist 06/12/2020    Intractable chronic migraine without aura and with status migrainosus 09/25/2019    Migraine with aura and without status migrainosus, not intractable 09/25/2019    Palpitations 07/16/2018    Dyspnea 03/09/2018    Cervical high risk HPV (human papillomavirus) test positive 01/17/2017    Musculoskeletal chest pain 01/18/2010    Anxiety, generalized 01/18/2010     "Headache, migraine 12/07/2009    Mastitis 12/07/2009    Airway hyperreactivity 12/07/2009    Abscess 12/07/2009     Past Surgical History:   Procedure Laterality Date    BREAST SURGERY Bilateral     augmentation with implants    KNEE SURGERY Left     PELVIC LAPAROSCOPY      SEPTORHINOPLASTY  1988    SINUS SURGERY      TONSILLECTOMY      TYMPANOSTOMY TUBE PLACEMENT       Social History     Tobacco Use    Smoking status: Never    Smokeless tobacco: Never   Substance Use Topics    Alcohol use: Yes    Drug use: No     Family History   Problem Relation Age of Onset    Nephrolithiasis Father         severe    Prostate cancer Paternal Grandfather     Clotting disorder Mother     Immunodeficiency Maternal Grandmother     Immunodeficiency Daughter     Immunodeficiency Daughter     Allergic rhinitis Neg Hx     Allergies Neg Hx     Angioedema Neg Hx     Asthma Neg Hx     Atopy Neg Hx     Eczema Neg Hx     Rhinitis Neg Hx     Urticaria Neg Hx      Review of patient's allergies indicates:   Allergen Reactions    Codeine Hives    Covid-19 vacc,mrna(pfizer)(pf) Anaphylaxis     To influenza    Flu vaccine [id number](6mos up) Anaphylaxis    Latex, natural rubber Swelling    Oxycodone Hives       Review of Systems   Review of Systems     Current Medications:  Current Outpatient Medications   Medication Instructions    AIMOVIG AUTOINJECTOR 140 mg, Subcutaneous, Every 28 days    azaTHIOprine (IMURAN) 50 mg, Oral, Daily    blunt needle, disposable 18 x 1 1/2 " Ndle Use to inject once a week    cetirizine (ZYRTEC) 10 mg, Oral, 2 times daily    dexAMETHasone (DECADRON) 0.1 % DrpS 2 drops, Left Eye, Every 12 hours    DULoxetine (CYMBALTA) 30 mg, Oral, 2 times daily    gentamicin (GARAMYCIN) 0.3 % ophthalmic solution Place 1 drop into both eyes every 4 (four) hours for 10 days    ketorolac (TORADOL) 10 mg, Oral, Every 6 hours    ketorolac (TORADOL) 60 mg, Intramuscular, Every 30 days    methotrexate (PF) 12.5 mg, Subcutaneous, Every 7 days " "   MOUNJARO 2.5 mg, Subcutaneous, Every 7 days    MOUNJARO 5 mg, Subcutaneous, Every 7 days    naproxen sodium (ANAPROX) 550 mg, Oral, 2 times daily    needle, disp, 27 gauge (EASY TOUCH HYPODERMIC NEEDLE) 27 gauge x 1/2" Ndle 1 each, Misc.(Non-Drug; Combo Route), Every 7 days    ondansetron (ZOFRAN) 8 mg, Oral, As needed (PRN)    ondansetron (ZOFRAN) 4 mg, Oral, Every 6 hours PRN    pramipexole (MIRAPEX) 0.25 mg, Oral, Nightly    safety needles 25 gauge x 1 1/2" Ndle Use to inject every 7 days    syringe, disposable, 1 mL Syrg 1 Syringe, Misc.(Non-Drug; Combo Route), Weekly    thyroid, pork, (ARMOUR THYROID) 15 mg Tab Take 1 tablet (15 mg total) by mouth daily before breakfast with water    tirzepatide 7.5 mg, Subcutaneous, Every 7 days    tirzepatide 10 mg, Subcutaneous, Every 7 days    topiramate (TOPAMAX) 50 mg, Oral, 2 times daily    ubrogepant (UBRELVY) 100 mg tablet Start 1 tablet (ubrelvy 100 mg) for headache attacks. May repeat once in 1 hours to a max of 2 per day    ZOLMitriptan (ZOMIG) 5 mg Spry Use 1 spray in one nostril as needed for migraine. May repeat once in 2 hours. No more than 2 days/week.         Objective:     Vitals:    02/07/24 1037   BP: 136/89   Pulse: 101   Weight: 90.7 kg (200 lb)   PainSc:   6   PainLoc: Generalized      Body mass index is 29.53 kg/m².     Physical Examinations:  Physical Exam     Disease Assessment Scores:  Patient's Global Assessment of arthritis (0-10): 9  Physician's Global Assessment of arthritis (0-10): 9  Number of Tender Joints (0-28): 9  Number of Swollen Joints (0-28): 10    There is currently no information documented on the homunculus. Go to the Rheumatology activity and complete the homunculus joint exam.         2/5/2024     8:39 AM   Rapid3 Question Responses and Scores   MDHAQ Score 2   Psychologic Score 7.7   Pain Score 6   When you awakened in the morning OVER THE LAST WEEK, did you feel stiff? Yes   If Yes, please indicate the number of hours until " "you are as limber as you will be for the day 8   Fatigue Score 10   Global Health Score 10   RAPID3 Score 7.56       Monitoring Lab Results:  Lab Results   Component Value Date    WBC 6.19 06/10/2022    RBC 5.69 (H) 06/10/2022    HGB 16.9 (H) 06/10/2022    HCT 49.6 (H) 06/10/2022    MCV 87 06/10/2022    MCH 29.7 06/10/2022    MCHC 34.1 06/10/2022    RDW 12.2 06/10/2022     06/10/2022        Lab Results   Component Value Date     06/10/2022    K 4.1 06/10/2022     06/10/2022    CO2 23 06/10/2022    GLU 75 06/10/2022    BUN 13 06/10/2022    CREATININE 1.0 06/10/2022    CALCIUM 9.6 06/10/2022    PROT 6.7 06/10/2022    ALBUMIN 4.2 06/10/2022    BILITOT 1.3 (H) 06/10/2022    ALKPHOS 31 (L) 06/10/2022    AST 16 06/10/2022    ALT 22 06/10/2022    ANIONGAP 11 06/10/2022       Lab Results   Component Value Date    SEDRATE <2 06/10/2022    CRP 1.1 06/10/2022        Lab Results   Component Value Date    UYVYFPCF97NQ 42 06/10/2022    FYYGIFBF76 >2000 (H) 06/03/2021        Lab Results   Component Value Date    CHOL 200 (H) 06/10/2022    HDL 74 06/10/2022    LDLCALC 111.0 06/10/2022    TRIG 75 06/10/2022       Lab Results   Component Value Date    CCPANTIBODIE <0.5 06/03/2021     Lab Results   Component Value Date    ANASCREEN Negative <1:80 06/10/2022    ANATITER 1:80 04/06/2021    DSDNA Negative 1:10 04/06/2021     No results found for: "HLABB27"    Infectious Disease Screening:  No results found for: "HEPBSAG", "HEPBCAB", "HEPBSAB", "HEPBSURFABQU", "HEPBIGM"  No results found for: "HEPCAB", "HEPCVAB", "HCVQUANTRES"  No results found for: "TBGOLDPLUS", "QUANTTBGDPL"  No results found for: "QUANTIFERON", "SVCMT", "QUANTAGVALUE", "QUANTNILVALU", "QUANTMITOGEN", "QFTTBAG", "QINT"     Imaging:  DEXA, Xrays, MRIs, CTs, etc    Old & Outside Medical Records:  Reviewed old and all outside medical records available in Care Everywhere    Current Medication Changes:  Medication List with Changes/Refills   New " "Medications    DEXAMETHASONE (DECADRON) 0.1 % DRPS    Place 2 drops into the left eye every 12 (twelve) hours. for 10 days    GENTAMICIN (GARAMYCIN) 0.3 % OPHTHALMIC SOLUTION    Place 1 drop into both eyes every 4 (four) hours for 10 days    METHOTREXATE, PF, 25 MG/ML SYRG    Inject 12.5 mg into the skin every 7 days.    NEEDLE, DISP, 27 GAUGE (EASY TOUCH HYPODERMIC NEEDLE) 27 GAUGE X 1/2" NDLE    1 each by Misc.(Non-Drug; Combo Route) route every 7 days.    PRAMIPEXOLE (MIRAPEX) 0.125 MG TABLET    Take 2 tablets (0.25 mg total) by mouth every evening.    SYRINGE, DISPOSABLE, 1 ML SYRG    1 Syringe by Misc.(Non-Drug; Combo Route) route once a week.    TIRZEPATIDE (MOUNJARO) 2.5 MG/0.5 ML PNIJ    Inject 2.5 mg into the skin every 7 days.    TIRZEPATIDE (MOUNJARO) 5 MG/0.5 ML PNIJ    Inject 5 mg into the skin every 7 days.    TIRZEPATIDE 10 MG/0.5 ML PNIJ    Inject 10 mg into the skin every 7 days.    TIRZEPATIDE 7.5 MG/0.5 ML PNIJ    Inject 7.5 mg into the skin every 7 days.   Current Medications    AZATHIOPRINE (IMURAN) 50 MG TAB    Take 1 tablet (50 mg total) by mouth once daily.    BLUNT NEEDLE, DISPOSABLE 18 X 1 1/2 " NDLE    Use to inject once a week    CETIRIZINE (ZYRTEC) 10 MG TABLET    Take 10 mg by mouth 2 (two) times a day.     DULOXETINE (CYMBALTA) 30 MG CAPSULE    Take 1 capsule (30 mg total) by mouth 2 (two) times daily.    ERENUMAB-AOOE (AIMOVIG AUTOINJECTOR) 140 MG/ML AUTOINJECTOR    Inject 1 mL (140 mg total) into the skin every 28 days.    KETOROLAC (TORADOL) 10 MG TABLET    Take 10 mg by mouth every 6 (six) hours.    KETOROLAC (TORADOL) 30 MG/ML (1 ML) INJECTION    Inject 2 mLs (60 mg total) into the muscle every 30 days.    NAPROXEN SODIUM (ANAPROX) 550 MG TABLET    Take 1 tablet (550 mg total) by mouth 2 (two) times daily.    ONDANSETRON (ZOFRAN) 4 MG TABLET    Take 8 mg by mouth as needed for Nausea.    ONDANSETRON (ZOFRAN) 4 MG TABLET    Take 1 tablet (4 mg total) by mouth every 6 (six) hours as " "needed for Nausea.    SAFETY NEEDLES 25 GAUGE X 1 1/2" NDLE    Use to inject every 7 days    THYROID, PORK, (ARMOUR THYROID) 15 MG TAB    Take 1 tablet (15 mg total) by mouth daily before breakfast with water    TOPIRAMATE (TOPAMAX) 50 MG TABLET    Take 1 tablet (50 mg total) by mouth 2 (two) times daily.    UBROGEPANT (UBRELVY) 100 MG TABLET    Start 1 tablet (ubrelvy 100 mg) for headache attacks. May repeat once in 1 hours to a max of 2 per day    ZOLMITRIPTAN (ZOMIG) 5 MG SPRY    Use 1 spray in one nostril as needed for migraine. May repeat once in 2 hours. No more than 2 days/week.   Discontinued Medications    SEMAGLUTIDE, WEIGHT LOSS, (WEGOVY) 1.7 MG/0.75 ML PNIJ    Inject 1.7 mg into the skin every 7 days.    SEMAGLUTIDE, WEIGHT LOSS, (WEGOVY) 1.7 MG/0.75 ML PNIJ    Inject 1.7 mg into the skin weekly        Assessment:     Pt is a 48 y.o. female with psoriatic arthritis and MCTD, Sjogren's,  The patient has not achieved clinical remission. Plan is to discontinue hydroxychloroquine (PLAQUENIL) and start methotrexate.      Plan:      Encounter Diagnoses   Name Primary?    Steroid-induced diabetes mellitus, sequela Yes    Hyperglycemia     MCTD (mixed connective tissue disease)     CVID (common variable immunodeficiency)     Hashimoto's thyroiditis     Intractable migraine with aura without status migrainosus     Non-celiac gluten sensitivity     PSA (psoriatic arthritis)     Iritis     Restless legs    Diagnoses and all orders for this visit:    Steroid-induced diabetes mellitus, sequela  -     tirzepatide (MOUNJARO) 2.5 mg/0.5 mL PnIj; Inject 2.5 mg into the skin every 7 days.  -     tirzepatide (MOUNJARO) 5 mg/0.5 mL PnIj; Inject 5 mg into the skin every 7 days.  -     tirzepatide 7.5 mg/0.5 mL PnIj; Inject 7.5 mg into the skin every 7 days.  -     tirzepatide 10 mg/0.5 mL PnIj; Inject 10 mg into the skin every 7 days.  -     Sedimentation rate; Future  -     C-Reactive Protein; Future  -     Comprehensive " Metabolic Panel; Future  -     CBC Auto Differential; Future  -     Follicle Stimulating Hormone; Future  -     Luteinizing Hormone; Future  -     DHEA; Future  -     Testosterone; Future  -     Progesterone; Future  -     Estrogens, Total; Future  -     Hepatitis C Antibody; Future  -     Quantiferon Gold TB; Future  -     Hepatitis B Surface Antigen; Future  -     Hepatitis B Surface Antibody, Qual/Quant; Future  -     Hepatitis B Core Antibody, Total; Future  -     Hepatitis B Surface Ab, Qualitative; Future  -     T4, Free; Future  -     TSH; Future  -     T3, Free; Future    Hyperglycemia  -     Sedimentation rate; Future  -     C-Reactive Protein; Future  -     Comprehensive Metabolic Panel; Future  -     CBC Auto Differential; Future  -     Follicle Stimulating Hormone; Future  -     Luteinizing Hormone; Future  -     DHEA; Future  -     Testosterone; Future  -     Progesterone; Future  -     Estrogens, Total; Future  -     Hepatitis C Antibody; Future  -     Quantiferon Gold TB; Future  -     Hepatitis B Surface Antigen; Future  -     Hepatitis B Surface Antibody, Qual/Quant; Future  -     Hepatitis B Core Antibody, Total; Future  -     Hepatitis B Surface Ab, Qualitative; Future  -     T4, Free; Future  -     TSH; Future  -     T3, Free; Future    MCTD (mixed connective tissue disease)  -     Sedimentation rate; Future  -     C-Reactive Protein; Future  -     Comprehensive Metabolic Panel; Future  -     CBC Auto Differential; Future  -     Follicle Stimulating Hormone; Future  -     Luteinizing Hormone; Future  -     DHEA; Future  -     Testosterone; Future  -     Progesterone; Future  -     Estrogens, Total; Future  -     Hepatitis C Antibody; Future  -     Quantiferon Gold TB; Future  -     Hepatitis B Surface Antigen; Future  -     Hepatitis B Surface Antibody, Qual/Quant; Future  -     Hepatitis B Core Antibody, Total; Future  -     Hepatitis B Surface Ab, Qualitative; Future  -     T4, Free; Future  -   "   TSH; Future  -     T3, Free; Future  -     methotrexate, PF, 25 mg/mL Syrg; Inject 12.5 mg into the skin every 7 days.  -     syringe, disposable, 1 mL Syrg; 1 Syringe by Misc.(Non-Drug; Combo Route) route once a week.  -     needle, disp, 27 gauge (EASY TOUCH HYPODERMIC NEEDLE) 27 gauge x 1/2" Ndle; 1 each by Misc.(Non-Drug; Combo Route) route every 7 days.  -     methylPREDNISolone acetate injection 160 mg  -     ketorolac injection 60 mg  -     cyanocobalamin injection 1,000 mcg    CVID (common variable immunodeficiency)  -     Sedimentation rate; Future  -     C-Reactive Protein; Future  -     Comprehensive Metabolic Panel; Future  -     CBC Auto Differential; Future  -     Follicle Stimulating Hormone; Future  -     Luteinizing Hormone; Future  -     DHEA; Future  -     Testosterone; Future  -     Progesterone; Future  -     Estrogens, Total; Future  -     Hepatitis C Antibody; Future  -     Quantiferon Gold TB; Future  -     Hepatitis B Surface Antigen; Future  -     Hepatitis B Surface Antibody, Qual/Quant; Future  -     Hepatitis B Core Antibody, Total; Future  -     Hepatitis B Surface Ab, Qualitative; Future  -     T4, Free; Future  -     TSH; Future  -     T3, Free; Future  -     methylPREDNISolone acetate injection 160 mg  -     ketorolac injection 60 mg  -     cyanocobalamin injection 1,000 mcg    Hashimoto's thyroiditis  -     Sedimentation rate; Future  -     C-Reactive Protein; Future  -     Comprehensive Metabolic Panel; Future  -     CBC Auto Differential; Future  -     Follicle Stimulating Hormone; Future  -     Luteinizing Hormone; Future  -     DHEA; Future  -     Testosterone; Future  -     Progesterone; Future  -     Estrogens, Total; Future  -     Hepatitis C Antibody; Future  -     Quantiferon Gold TB; Future  -     Hepatitis B Surface Antigen; Future  -     Hepatitis B Surface Antibody, Qual/Quant; Future  -     Hepatitis B Core Antibody, Total; Future  -     Hepatitis B Surface Ab, " Qualitative; Future  -     T4, Free; Future  -     TSH; Future  -     T3, Free; Future    Intractable migraine with aura without status migrainosus  -     Sedimentation rate; Future  -     C-Reactive Protein; Future  -     Comprehensive Metabolic Panel; Future  -     CBC Auto Differential; Future  -     Follicle Stimulating Hormone; Future  -     Luteinizing Hormone; Future  -     DHEA; Future  -     Testosterone; Future  -     Progesterone; Future  -     Estrogens, Total; Future  -     Hepatitis C Antibody; Future  -     Quantiferon Gold TB; Future  -     Hepatitis B Surface Antigen; Future  -     Hepatitis B Surface Antibody, Qual/Quant; Future  -     Hepatitis B Core Antibody, Total; Future  -     Hepatitis B Surface Ab, Qualitative; Future  -     T4, Free; Future  -     TSH; Future  -     T3, Free; Future  -     methylPREDNISolone acetate injection 160 mg  -     ketorolac injection 60 mg  -     cyanocobalamin injection 1,000 mcg    Non-celiac gluten sensitivity  -     Sedimentation rate; Future  -     C-Reactive Protein; Future  -     Comprehensive Metabolic Panel; Future  -     CBC Auto Differential; Future  -     Follicle Stimulating Hormone; Future  -     Luteinizing Hormone; Future  -     DHEA; Future  -     Testosterone; Future  -     Progesterone; Future  -     Estrogens, Total; Future  -     Hepatitis C Antibody; Future  -     Quantiferon Gold TB; Future  -     Hepatitis B Surface Antigen; Future  -     Hepatitis B Surface Antibody, Qual/Quant; Future  -     Hepatitis B Core Antibody, Total; Future  -     Hepatitis B Surface Ab, Qualitative; Future  -     T4, Free; Future  -     TSH; Future  -     T3, Free; Future    PSA (psoriatic arthritis)  -     Sedimentation rate; Future  -     C-Reactive Protein; Future  -     Comprehensive Metabolic Panel; Future  -     CBC Auto Differential; Future  -     Follicle Stimulating Hormone; Future  -     Luteinizing Hormone; Future  -     DHEA; Future  -      "Testosterone; Future  -     Progesterone; Future  -     Estrogens, Total; Future  -     Hepatitis C Antibody; Future  -     Quantiferon Gold TB; Future  -     Hepatitis B Surface Antigen; Future  -     Hepatitis B Surface Antibody, Qual/Quant; Future  -     Hepatitis B Core Antibody, Total; Future  -     Hepatitis B Surface Ab, Qualitative; Future  -     T4, Free; Future  -     TSH; Future  -     T3, Free; Future  -     methotrexate, PF, 25 mg/mL Syrg; Inject 12.5 mg into the skin every 7 days.  -     syringe, disposable, 1 mL Syrg; 1 Syringe by Misc.(Non-Drug; Combo Route) route once a week.  -     needle, disp, 27 gauge (EASY TOUCH HYPODERMIC NEEDLE) 27 gauge x 1/2" Ndle; 1 each by Misc.(Non-Drug; Combo Route) route every 7 days.  -     methylPREDNISolone acetate injection 160 mg  -     ketorolac injection 60 mg  -     cyanocobalamin injection 1,000 mcg    Iritis  -     gentamicin (GARAMYCIN) 0.3 % ophthalmic solution; Place 1 drop into both eyes every 4 (four) hours for 10 days  -     dexAMETHasone (DECADRON) 0.1 % DrpS; Place 2 drops into the left eye every 12 (twelve) hours. for 10 days  -     methylPREDNISolone acetate injection 160 mg  -     ketorolac injection 60 mg  -     cyanocobalamin injection 1,000 mcg    Restless legs  -     gentamicin (GARAMYCIN) 0.3 % ophthalmic solution; Place 1 drop into both eyes every 4 (four) hours for 10 days  -     dexAMETHasone (DECADRON) 0.1 % DrpS; Place 2 drops into the left eye every 12 (twelve) hours. for 10 days  -     pramipexole (MIRAPEX) 0.125 MG tablet; Take 2 tablets (0.25 mg total) by mouth every evening.     1. Consider a biologic when she is better   2. Pt is doing poorly and is flaring rec work from home x  3 days.    3.labs ordered    More than 50% of the  40 minute encounter was spent face to face counseling the patient regarding current status and future plan of care as well as side effects  of the medications. All questions were answered to patient's " satisfaction also includes  non-face to face time preparing to see the patient (eg, review of tests), Obtaining and/or reviewing separately obtained history, Documenting clinical information in the electronic or other health record, Independently interpreting results

## 2024-02-07 NOTE — LETTER
February 7, 2024    Loren Lemus  [facility] [id number] [place] [address] Rheumatology  [address] [place] [address]  Phone: [phone]  Fax: [phone] To Whom It May Concern,    Mrs Lemus is currently under my care for the following conditions: Mixed Connective Tissue Disorder, Common Variable Immune Deficiency, Hashimotos and Psoriatic Arthritis. Due to her flare at this time she will need to work from home. The joint pain is debilitating during a flare. Please allow her to work from home from 2/7/2024 to 2/12/2024.    If you have any questions please feel free to contact my staff.      Wu Rasmussen MD

## 2024-02-08 ENCOUNTER — TELEPHONE (OUTPATIENT)
Dept: RHEUMATOLOGY | Facility: CLINIC | Age: 49
End: 2024-02-08
Payer: COMMERCIAL

## 2024-02-08 DIAGNOSIS — R76.11 POSITIVE TB TEST: Primary | ICD-10-CM

## 2024-02-08 LAB
HBV CORE AB SERPL QL IA: NORMAL
HBV SURFACE AB SER-ACNC: 88.4 MIU/ML
HBV SURFACE AB SER-ACNC: REACTIVE M[IU]/ML
HBV SURFACE AG SERPL QL IA: NORMAL
HCV AB SERPL QL IA: NORMAL
TESTOST SERPL-MCNC: 32 NG/DL (ref 5–73)

## 2024-02-08 NOTE — TELEPHONE ENCOUNTER
Advised nurse to send referral to [facility] Dept for further evaluation for TB. She also called patient and advised her to expect a phone call from the health dept in [place]. Nurse faxed referral to health dept.

## 2024-02-08 NOTE — TELEPHONE ENCOUNTER
----- Message from Gisell Denise LPN sent at 2/8/2024  1:50 PM CST -----  Regarding: FW: Needs return call    ----- Message -----  From: Kaylee Stinson  Sent: 2/8/2024   1:27 PM CST  To: [facility] Staff  Subject: Needs return call                                Type: Needs Medical Advice  Who Called:  Claude    Best Call Back Number: [phone]    Additional Information: She has tested positive for TB and needs to speak to the office regarding this results please call iman

## 2024-02-09 ENCOUNTER — TELEPHONE (OUTPATIENT)
Dept: INFECTIOUS DISEASES | Facility: CLINIC | Age: 49
End: 2024-02-09
Payer: COMMERCIAL

## 2024-02-09 NOTE — TELEPHONE ENCOUNTER
----- Message from Kaylie Interiano sent at 2/9/2024  8:13 AM CST -----  Type:  Sooner Appointment Request    Caller is requesting a sooner appointment.  Caller declined first available appointment listed below.  Caller will not accept being placed on the waitlist and is requesting a message be sent to doctor.    Name of Caller:  patient  When is the first available appointment?  N/a  Symptoms:  Positive TB test  Would the patient rather a call back or a response via [facility]? call  Best Call Back Number:  [phone] (home)   Additional Information:

## 2024-02-09 NOTE — TELEPHONE ENCOUNTER
03/23/22 1645   Vital Signs   BP (!) 123/99   MAP (mmHg) 109   BP Location Left arm   BP Method Automatic   Patient Position Lying       Notified Dr Vani CAT to place orders for PO and IVP hydralazine.   Returned call to patient, patient states she has an appt scheduled with [facility] [place], TB clinic already. Dr. Rasmussen entered the referral just to fill in any gaps, if necessary, Explained to patient that referral to [facility] is the correct action. Will cancel referral to ID.  Patient voiced appreciation for the call back .

## 2024-02-10 LAB
HBV SURFACE AB SER QL IA: POSITIVE
HBV SURFACE AB SERPL IA-ACNC: 96 MIU/ML

## 2024-02-12 LAB
DHEA SERPL-MCNC: 2.04 NG/ML (ref 0.63–4.7)
ESTROGEN SERPL-MCNC: 315 PG/ML

## 2024-02-13 LAB
NUDT15 GENOTYPE: NORMAL
NUDT15 PHENOTYPE: NORMAL
TPMT ADDITIONAL INFORMATION: NORMAL
TPMT DISCLAIMER: NORMAL
TPMT GENOTYPE RESULT: NORMAL
TPMT INTERPRETATION: NORMAL
TPMT METHOD: NORMAL
TPMT PHENOTYPE: NORMAL
TPMT REVIEWED BY: NORMAL

## 2024-02-20 ENCOUNTER — TELEPHONE (OUTPATIENT)
Dept: RHEUMATOLOGY | Facility: CLINIC | Age: 49
End: 2024-02-20
Payer: COMMERCIAL

## 2024-02-20 NOTE — TELEPHONE ENCOUNTER
See telephone encounter 2/8/24. [facility] Patient also reached out to ID

## 2024-02-23 ENCOUNTER — TELEPHONE (OUTPATIENT)
Dept: RHEUMATOLOGY | Facility: CLINIC | Age: 49
End: 2024-02-23
Payer: COMMERCIAL

## 2024-02-23 ENCOUNTER — PROCEDURE VISIT (OUTPATIENT)
Dept: NEUROLOGY | Facility: CLINIC | Age: 49
End: 2024-02-23
Payer: COMMERCIAL

## 2024-02-23 VITALS
DIASTOLIC BLOOD PRESSURE: 86 MMHG | HEART RATE: 93 BPM | BODY MASS INDEX: 29.63 KG/M2 | RESPIRATION RATE: 20 BRPM | SYSTOLIC BLOOD PRESSURE: 123 MMHG | WEIGHT: 200.63 LBS

## 2024-02-23 DIAGNOSIS — G43.711 INTRACTABLE CHRONIC MIGRAINE WITHOUT AURA AND WITH STATUS MIGRAINOSUS: Primary | ICD-10-CM

## 2024-02-23 PROCEDURE — 64615 CHEMODENERV MUSC MIGRAINE: CPT | Mod: S$GLB,,, | Performed by: NURSE PRACTITIONER

## 2024-02-23 NOTE — PROCEDURES
ProceduresBOTOX PROCEDURE    PROCEDURE PERFORMED: Botulinum toxin injection (92619)    CLINICAL INDICATION: G43.719    A time out was conducted just before the start of the procedure to verify the correct patient and procedure, procedure location, and all relevant critical information.   The patient meets criteria for chronic headaches according to the ICHD-II, the patient has more than 15 headaches a month out of at least 8 are migraines which last for more than 4 hours a day.    The Botox injections have achieved well over 50%  improvement in the patient's symptoms. Migraines have been reduced at least 7 days per month and the number of cumulative hours suffering with headaches has been reduced at least 100 total hours per month. Today she does have a headache indicating that the Botox has worn off. Frequency of treatment is every 3 months unless no response to the treatments, at which time we will discontinue the injections.      DESCRIPTION OF PROCEDURE: After obtaining informed consent and under aseptic technique, a total of 185 units of botulinum toxin type A were injected in the following muscles: Procerus 5 units,  5 units bilaterally, frontalis 20 units, temporalis 20 units bilaterally, Masseters 15 units bilaterally (3 sites, 5 units per site), occipitalis 15 units, upper cervical paraspinals 10 units bilaterally and trapezius 15 units bilaterally. The patient was given a total of 185 units in 37 sites.The patient tolerated the procedure well. There were no complications. The patient was given a prescription for repeat treatment in 3 months.     Unavoidable waste 15 units      Mitchell Hill M.D  Medical Director, Headache and Facial Pain  [facility]

## 2024-02-24 NOTE — TELEPHONE ENCOUNTER
----- Message from Gisell Denise LPN sent at 2/12/2024  4:57 PM CST -----  Regarding: FW: Methotrexate    ----- Message -----  From: Timo Franklin, JayshreeD  Sent: 2/12/2024   4:53 PM CST  To: Wu Rasmussen MD; Grady Washburn Staff  Subject: Methotrexate                                     Good evening Dr. Rasmussen,    I started working on Ms. Lemus's methotrexate order last week. With Ms. Lemus's recent positive TB test, how would you like to move forward with the order? If the patient will not be starting soon, may the methotrexate order be discontinued, please? If you have any questions or concerns please let me know.     Kind regards,  Timo Franklin, PharmD  Clinical Pharmacist     [facility]   [email or web address]  (O)[phone] (F) [phone]

## 2024-02-24 NOTE — TELEPHONE ENCOUNTER
Please continue to work on the MTX rx. TB referral placed for further evaluation but if ruled out patient to start MTX. Will follow

## 2024-04-05 ENCOUNTER — TELEPHONE (OUTPATIENT)
Dept: RHEUMATOLOGY | Facility: CLINIC | Age: 49
End: 2024-04-05
Payer: COMMERCIAL

## 2024-04-05 DIAGNOSIS — M35.1 MCTD (MIXED CONNECTIVE TISSUE DISEASE): ICD-10-CM

## 2024-04-05 DIAGNOSIS — L40.50 PSA (PSORIATIC ARTHRITIS): Primary | ICD-10-CM

## 2024-04-05 RX ORDER — METHOTREXATE 25 MG/ML
10 INJECTION, SOLUTION INTRA-ARTERIAL; INTRAMUSCULAR; INTRAVENOUS ONCE
Qty: 10 ML | Refills: 3 | Status: CANCELLED | OUTPATIENT
Start: 2024-04-05 | End: 2024-04-05

## 2024-04-05 RX ORDER — METHOTREXATE 25 MG/ML
12.5 INJECTION INTRA-ARTERIAL; INTRAMUSCULAR; INTRATHECAL; INTRAVENOUS
Qty: 2 ML | Refills: 3 | Status: SHIPPED | OUTPATIENT
Start: 2024-04-05

## 2024-04-05 NOTE — TELEPHONE ENCOUNTER
----- Message from Tish Burdick, PharmD sent at 4/5/2024  9:25 AM CDT -----  Regarding: FW: Methotrexate  Good morning!    I am following up on this message.  I see the notes from [facility] in her media that she is cleared to start MTX.  However, [facility] has not received the order for the vials yet.  Please send order for MTX vials.    Thank you,  Tish CLEMONS    ----- Message -----  From: Tish Burdick, PharmMELY  Sent: 3/28/2024  10:44 AM CDT  To: Wu Rasmussen MD; Jayshree PolkD; #  Subject: Methotrexate                                     Good morning Dr. Rasmussen and staff,    [facility] received an order for methotrexate PF syringes.  This formulation is not available.  Ms. Lemus is willing to use the vials.  Can you please resend the order as the vials?      Also, she stated she saw [facility] for her positive TB test and was cleared to start.  She will be stopping by the office today to drop her paperwork off.  Can you confirm she is cleared to start?    Thank you,  Tish Burdick, PharmD  Clinical Pharmacist    [facility]  [address] [place] [address]  P [phone]  F [phone]

## 2024-04-05 NOTE — TELEPHONE ENCOUNTER
Cris phoned in rx to [facility] and patient set up for delivery of mtx vials    Approved phoned in rx that was sent to me

## 2024-04-08 ENCOUNTER — OFFICE VISIT (OUTPATIENT)
Dept: NEUROLOGY | Facility: CLINIC | Age: 49
End: 2024-04-08
Payer: COMMERCIAL

## 2024-04-08 ENCOUNTER — PATIENT MESSAGE (OUTPATIENT)
Dept: NEUROLOGY | Facility: CLINIC | Age: 49
End: 2024-04-08

## 2024-04-08 ENCOUNTER — TELEPHONE (OUTPATIENT)
Dept: RHEUMATOLOGY | Facility: CLINIC | Age: 49
End: 2024-04-08
Payer: COMMERCIAL

## 2024-04-08 DIAGNOSIS — M35.1 MCTD (MIXED CONNECTIVE TISSUE DISEASE): ICD-10-CM

## 2024-04-08 DIAGNOSIS — R76.8 LOW SERUM IGG4 SUBCLASS LEVEL: ICD-10-CM

## 2024-04-08 DIAGNOSIS — E06.3 HASHIMOTO'S THYROIDITIS: ICD-10-CM

## 2024-04-08 DIAGNOSIS — R00.2 PALPITATIONS: ICD-10-CM

## 2024-04-08 DIAGNOSIS — K90.41 NON-CELIAC GLUTEN SENSITIVITY: ICD-10-CM

## 2024-04-08 DIAGNOSIS — R76.8 LOW SERUM IGG2 SUBCLASS LEVEL: ICD-10-CM

## 2024-04-08 DIAGNOSIS — L40.50 PSA (PSORIATIC ARTHRITIS): ICD-10-CM

## 2024-04-08 DIAGNOSIS — M47.812 ARTHROPATHY OF CERVICAL FACET JOINT: ICD-10-CM

## 2024-04-08 DIAGNOSIS — D84.9 IMMUNE DEFICIENCY DISORDER: ICD-10-CM

## 2024-04-08 DIAGNOSIS — G43.711 INTRACTABLE CHRONIC MIGRAINE WITHOUT AURA AND WITH STATUS MIGRAINOSUS: Primary | ICD-10-CM

## 2024-04-08 PROCEDURE — 99214 OFFICE O/P EST MOD 30 MIN: CPT | Mod: 95,,, | Performed by: PSYCHIATRY & NEUROLOGY
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
## 2024-04-08 NOTE — TELEPHONE ENCOUNTER
Received message from [facility] that new order for MTX was needed since the other one got DC'ed    Talked to [facility]. Verbal given so previous order reordered. No PF available so changed to the regular vial

## 2024-04-29 ENCOUNTER — PATIENT MESSAGE (OUTPATIENT)
Dept: ADMINISTRATIVE | Facility: OTHER | Age: 49
End: 2024-04-29
Payer: COMMERCIAL

## 2024-05-09 ENCOUNTER — TELEPHONE (OUTPATIENT)
Dept: NEUROLOGY | Facility: CLINIC | Age: 49
End: 2024-05-09
Payer: COMMERCIAL

## 2024-05-09 DIAGNOSIS — E09.9 STEROID-INDUCED DIABETES MELLITUS, SEQUELA: ICD-10-CM

## 2024-05-09 DIAGNOSIS — T38.0X5S STEROID-INDUCED DIABETES MELLITUS, SEQUELA: ICD-10-CM

## 2024-05-09 NOTE — TELEPHONE ENCOUNTER
----- Message from Richmond Humphrey sent at 5/9/2024  1:59 PM CDT -----  Regarding: reschedule botox  Contact: loren at [phone]  Type:  Reschedule Botox    Caller is requesting a BOTOX appointment.      Name of Caller:  Loren    When is the first available appointment?  Dept Book    Procedure:  Botox    Best Call Back Number:  [phone]    Additional Information:  pt has [facility] [employer] effective 5/6/24. Needs to reschedule botox b/c not approved by NEW BCBS and is not covered by current one on chart.

## 2024-05-09 NOTE — TELEPHONE ENCOUNTER
Patient had an insurance change and stated need to reschedule appointment due to this and once she gets the information then she will update it. Instructed patient to let us know when she does. Rescheduled Botox appointment. Date/Time confirmed. Verbalized understanding. Referral attached.

## 2024-05-14 DIAGNOSIS — K90.41 NON-CELIAC GLUTEN SENSITIVITY: ICD-10-CM

## 2024-05-14 DIAGNOSIS — E06.3 HASHIMOTO'S THYROIDITIS: ICD-10-CM

## 2024-05-14 DIAGNOSIS — M35.1 MCTD (MIXED CONNECTIVE TISSUE DISEASE): ICD-10-CM

## 2024-05-14 DIAGNOSIS — R76.8 LOW SERUM IGG2 SUBCLASS LEVEL: ICD-10-CM

## 2024-05-14 DIAGNOSIS — R76.8 ANA POSITIVE: ICD-10-CM

## 2024-05-14 DIAGNOSIS — M17.0 PRIMARY OSTEOARTHRITIS OF BOTH KNEES: ICD-10-CM

## 2024-05-14 DIAGNOSIS — R76.8 LOW SERUM IGG4 SUBCLASS LEVEL: ICD-10-CM

## 2024-05-14 DIAGNOSIS — D84.9 IMMUNE DEFICIENCY DISORDER: ICD-10-CM

## 2024-05-14 DIAGNOSIS — D83.9 CVID (COMMON VARIABLE IMMUNODEFICIENCY): ICD-10-CM

## 2024-05-15 RX ORDER — DULOXETIN HYDROCHLORIDE 30 MG/1
30 CAPSULE, DELAYED RELEASE ORAL 2 TIMES DAILY
Qty: 60 CAPSULE | Refills: 5 | Status: SHIPPED | OUTPATIENT
Start: 2024-05-15

## 2024-05-15 RX ORDER — TOPIRAMATE 50 MG/1
50 TABLET, FILM COATED ORAL 2 TIMES DAILY
Qty: 60 TABLET | Refills: 5 | Status: SHIPPED | OUTPATIENT
Start: 2024-05-15

## 2024-05-22 ENCOUNTER — PATIENT MESSAGE (OUTPATIENT)
Dept: ADMINISTRATIVE | Facility: OTHER | Age: 49
End: 2024-05-22
Payer: COMMERCIAL

## 2024-06-13 ENCOUNTER — TELEPHONE (OUTPATIENT)
Dept: NEUROLOGY | Facility: CLINIC | Age: 49
End: 2024-06-13
Payer: COMMERCIAL

## 2024-06-13 NOTE — TELEPHONE ENCOUNTER
----- Message from Guadalupe Hill MD sent at 6/13/2024  8:53 AM CDT -----  Regarding: FW: Sooner Appointment  Please advance appointment if there is a cancellation. Call and find out the nature of the urgency. Thank you  ----- Message -----  From: Fredo Carter  Sent: 6/13/2024   8:48 AM CDT  To: Guadalupe Hill MD  Subject: Sooner Appointment                               Patient came in asking for a sooner appointment time than her original appointment that was scheduled Tues. 6-18 @845a. She wanted to know if they had any sooner appointments , can you please reach out to this patient to give a sooner available appointment date if possible.         Thanks.

## 2024-06-13 NOTE — TELEPHONE ENCOUNTER
Attempted to contact the patient to inform that at this time there is nothing sooner than Tuesday 6/18 but she will be placed as high priority wait list, no answer, left voice mail.

## 2024-06-18 ENCOUNTER — PROCEDURE VISIT (OUTPATIENT)
Dept: NEUROLOGY | Facility: CLINIC | Age: 49
End: 2024-06-18
Payer: COMMERCIAL

## 2024-06-18 VITALS
HEART RATE: 112 BPM | DIASTOLIC BLOOD PRESSURE: 88 MMHG | RESPIRATION RATE: 17 BRPM | SYSTOLIC BLOOD PRESSURE: 144 MMHG | BODY MASS INDEX: 29.71 KG/M2 | HEIGHT: 69 IN | WEIGHT: 200.63 LBS

## 2024-06-18 DIAGNOSIS — G43.711 INTRACTABLE CHRONIC MIGRAINE WITHOUT AURA AND WITH STATUS MIGRAINOSUS: Primary | ICD-10-CM

## 2024-06-18 DIAGNOSIS — M47.812 ARTHROPATHY OF CERVICAL FACET JOINT: ICD-10-CM

## 2024-06-18 PROCEDURE — 96372 THER/PROPH/DIAG INJ SC/IM: CPT | Mod: S$GLB,,, | Performed by: PSYCHIATRY & NEUROLOGY

## 2024-06-18 PROCEDURE — 64615 CHEMODENERV MUSC MIGRAINE: CPT | Mod: S$GLB,,, | Performed by: PSYCHIATRY & NEUROLOGY

## 2024-06-18 RX ORDER — KETOROLAC TROMETHAMINE 30 MG/ML
30 INJECTION, SOLUTION INTRAMUSCULAR; INTRAVENOUS
Status: COMPLETED | OUTPATIENT
Start: 2024-06-18 | End: 2024-06-18

## 2024-06-18 RX ADMIN — KETOROLAC TROMETHAMINE 30 MG: 30 INJECTION, SOLUTION INTRAMUSCULAR; INTRAVENOUS at 09:06

## 2024-06-18 NOTE — PROCEDURES
ProceduresBOTOX PROCEDURE    PROCEDURE PERFORMED: Botulinum toxin injection (51132)    CLINICAL INDICATION: G43.719    A time out was conducted just before the start of the procedure to verify the correct patient and procedure, procedure location, and all relevant critical information.   The patient meets criteria for chronic headaches according to the ICHD-II, the patient has more than 15 headaches a month out of at least 8 are migraines which last for more than 4 hours a day.    The Botox injections have achieved well over 50%  improvement in the patient's symptoms. Migraines have been reduced at least 7 days per month and the number of cumulative hours suffering with headaches has been reduced at least 100 total hours per month. Today she does have a headache indicating that the Botox has worn off. Frequency of treatment is every 3 months unless no response to the treatments, at which time we will discontinue the injections.      DESCRIPTION OF PROCEDURE: After obtaining informed consent and under aseptic technique, a total of 185 units of botulinum toxin type A were injected in the following muscles: Procerus 5 units,  5 units bilaterally, frontalis 20 units, temporalis 20 units bilaterally, Masseters 15 units bilaterally (3 sites, 5 units per site), occipitalis 15 units, upper cervical paraspinals 10 units bilaterally and trapezius 15 units bilaterally. The patient was given a total of 185 units in 37 sites.The patient tolerated the procedure well. There were no complications. The patient was given a prescription for repeat treatment in 3 months.     Unavoidable waste 15 units      Mitchell Hill M.D  Medical Director, Headache and Facial Pain  [facility]

## 2024-06-22 ENCOUNTER — PATIENT MESSAGE (OUTPATIENT)
Dept: ADMINISTRATIVE | Facility: OTHER | Age: 49
End: 2024-06-22
Payer: COMMERCIAL

## 2024-06-24 ENCOUNTER — OFFICE VISIT (OUTPATIENT)
Dept: RHEUMATOLOGY | Facility: CLINIC | Age: 49
End: 2024-06-24
Payer: COMMERCIAL

## 2024-06-24 ENCOUNTER — LAB VISIT (OUTPATIENT)
Dept: LAB | Facility: HOSPITAL | Age: 49
End: 2024-06-24
Attending: INTERNAL MEDICINE
Payer: COMMERCIAL

## 2024-06-24 VITALS
SYSTOLIC BLOOD PRESSURE: 111 MMHG | DIASTOLIC BLOOD PRESSURE: 70 MMHG | BODY MASS INDEX: 31.06 KG/M2 | WEIGHT: 209.69 LBS | HEART RATE: 96 BPM | HEIGHT: 69 IN

## 2024-06-24 DIAGNOSIS — L40.50 PSA (PSORIATIC ARTHRITIS): ICD-10-CM

## 2024-06-24 DIAGNOSIS — E06.3 HASHIMOTO'S THYROIDITIS: ICD-10-CM

## 2024-06-24 DIAGNOSIS — K90.41 NON-CELIAC GLUTEN SENSITIVITY: ICD-10-CM

## 2024-06-24 DIAGNOSIS — M35.1 MCTD (MIXED CONNECTIVE TISSUE DISEASE): ICD-10-CM

## 2024-06-24 DIAGNOSIS — L40.50 PSA (PSORIATIC ARTHRITIS): Primary | ICD-10-CM

## 2024-06-24 DIAGNOSIS — D83.9 CVID (COMMON VARIABLE IMMUNODEFICIENCY): ICD-10-CM

## 2024-06-24 DIAGNOSIS — Z79.631 METHOTREXATE, LONG TERM, CURRENT USE: ICD-10-CM

## 2024-06-24 PROCEDURE — 3074F SYST BP LT 130 MM HG: CPT | Mod: CPTII,S$GLB,, | Performed by: INTERNAL MEDICINE

## 2024-06-24 PROCEDURE — 1160F RVW MEDS BY RX/DR IN RCRD: CPT | Mod: CPTII,S$GLB,, | Performed by: INTERNAL MEDICINE

## 2024-06-24 PROCEDURE — 36415 COLL VENOUS BLD VENIPUNCTURE: CPT | Mod: PO | Performed by: INTERNAL MEDICINE

## 2024-06-24 PROCEDURE — 99215 OFFICE O/P EST HI 40 MIN: CPT | Mod: S$GLB,,, | Performed by: INTERNAL MEDICINE

## 2024-06-24 PROCEDURE — 3008F BODY MASS INDEX DOCD: CPT | Mod: CPTII,S$GLB,, | Performed by: INTERNAL MEDICINE

## 2024-06-24 PROCEDURE — 3078F DIAST BP <80 MM HG: CPT | Mod: CPTII,S$GLB,, | Performed by: INTERNAL MEDICINE

## 2024-06-24 PROCEDURE — 1159F MED LIST DOCD IN RCRD: CPT | Mod: CPTII,S$GLB,, | Performed by: INTERNAL MEDICINE

## 2024-06-24 PROCEDURE — 99999 PR PBB SHADOW E&M-EST. PATIENT-LVL V: CPT | Mod: PBBFAC,,, | Performed by: INTERNAL MEDICINE

## 2024-06-24 RX ORDER — UPADACITINIB 15 MG/1
15 TABLET, EXTENDED RELEASE ORAL DAILY
Qty: 30 TABLET | Refills: 11 | Status: ACTIVE | OUTPATIENT
Start: 2024-06-24 | End: 2025-06-24

## 2024-06-24 RX ORDER — NAPROXEN SODIUM 220 MG
1 TABLET ORAL DAILY
Qty: 100 EACH | Refills: 0 | Status: SHIPPED | OUTPATIENT
Start: 2024-06-24 | End: 2024-10-04

## 2024-06-24 NOTE — PROGRESS NOTES
Subjective:     Patient ID:  Loren Lemus    Chief Complaint:  Disease Management     History of Present Illness:  Pt is a 48 y.o. female Follow up: 49 yo female with CVID, PSA, non celiac gluten hypersenitivity, hashimoto's thyroiditis, MCDT on MTX doing poorly severe fatigue dc plaquenil. H/o migraine Patient complains of arthralgias and myalgias for which has been present for a few years. She has lost 5% of her body weight but is unable to  lose any more. She has tried diet exercise and other medications including Alii, adepix and contrave without results.Pain is located in multiple joints, both shoulder(s), both elbow(s), both wrist(s), both MCP(s): 1st, 2nd, 3rd, 4th and 5th, both PIP(s): 1st, 2nd, 3rd, 4th and 5th, both DIP(s): 1st and 2nd, both hip(s), both knee(s) and both MTP(s): 1st, 2nd, 3rd, 4th and 5th, is described as aching, pulsating, shooting and throbbing, and is constant, moderate .  Associated symptoms include: crepitation, decreased range of motion, edema, effusion, tenderness and warmth.        Rheumatologic History:   - Diagnosis/es:  - Positive serologies:  - Infectious screening labs:  - Previous Treatments:  - Current Treatments:      Rheumatologic History:   - Diagnosis/es:  - Positive serologies:  - Infectious screening labs:  - Previous Treatments:  - Current Treatments:     Interval History:   Hospitalization since last office visit: No    Patient Active Problem List    Diagnosis Date Noted    Hypovitaminosis D 03/27/2021    Perimenopause 03/27/2021    Dysglycemia 03/26/2021    Weight gain 03/26/2021    Dysmetabolic syndrome 03/26/2021    History of endometriosis 03/26/2021    History of nephrolithiasis 03/26/2021    History of hysterectomy 03/26/2021    Tachycardia, unspecified 03/26/2021    Contusion of left wrist 06/12/2020    Intractable chronic migraine without aura and with status migrainosus 09/25/2019    Migraine with aura and without status migrainosus, not intractable  "09/25/2019    Palpitations 07/16/2018    Dyspnea 03/09/2018    Cervical high risk HPV (human papillomavirus) test positive 01/17/2017    Musculoskeletal chest pain 01/18/2010    Anxiety, generalized 01/18/2010    Headache, migraine 12/07/2009    Mastitis 12/07/2009    Airway hyperreactivity 12/07/2009    Abscess 12/07/2009     Past Surgical History:   Procedure Laterality Date    BREAST SURGERY Bilateral     augmentation with implants    KNEE SURGERY Left     PELVIC LAPAROSCOPY      SEPTORHINOPLASTY  1988    SINUS SURGERY      TONSILLECTOMY      TYMPANOSTOMY TUBE PLACEMENT       Social History     Tobacco Use    Smoking status: Never    Smokeless tobacco: Never   Substance Use Topics    Alcohol use: Yes    Drug use: No     Family History   Problem Relation Name Age of Onset    Nephrolithiasis Father          severe    Prostate cancer Paternal Grandfather      Clotting disorder Mother      Immunodeficiency Maternal Grandmother      Immunodeficiency Daughter Ana     Immunodeficiency Daughter Gwen     Allergic rhinitis Neg Hx      Allergies Neg Hx      Angioedema Neg Hx      Asthma Neg Hx      Atopy Neg Hx      Eczema Neg Hx      Rhinitis Neg Hx      Urticaria Neg Hx       Review of patient's allergies indicates:   Allergen Reactions    Codeine Hives    Covid-19 vacc,mrna(pfizer)(pf) Anaphylaxis     To influenza    Flu vaccine [id number](6mos up) Anaphylaxis    Latex, natural rubber Swelling    Oxycodone Hives       Review of Systems   Review of Systems     Current Medications:  Current Outpatient Medications   Medication Instructions    AIMOVIG AUTOINJECTOR 140 mg, Subcutaneous, Every 28 days    blunt needle, disposable 18 x 1 1/2 " Ndle Use to inject once a week    cetirizine (ZYRTEC) 10 mg, Oral, 2 times daily    DULoxetine (CYMBALTA) 30 mg, Oral, 2 times daily    ketorolac (TORADOL) 60 mg, Intramuscular, Every 30 days    methotrexate (PF) 12.5 mg, Subcutaneous, Every 7 days    methotrexate 25 mg/mL injection " "Inject 0.5 ml (12.5 mg) into the skin every 7 days.    naproxen sodium (ANAPROX) 550 mg, Oral, 2 times daily    needle, disp, 27 gauge (EASY TOUCH HYPODERMIC NEEDLE) 27 gauge x 1/2" Ndle Use 1 needle every 7 days.    ondansetron (ZOFRAN) 8 mg, Oral, As needed (PRN)    ondansetron (ZOFRAN) 4 mg, Oral, Every 6 hours PRN    pramipexole (MIRAPEX) 0.25 mg, Oral, Nightly    RINVOQ 15 mg, Oral, Daily    safety needles 25 gauge x 1 1/2" Ndle Use to inject every 7 days    syringe, disposable, 1 mL Syrg Use 1 Syringe once a week.    thyroid, pork, (ARMOUR THYROID) 15 mg Tab Take 1 tablet (15 mg total) by mouth daily before breakfast with water    tirzepatide 10 mg, Subcutaneous, Every 7 days    topiramate (TOPAMAX) 50 mg, Oral, 2 times daily    ubrogepant (UBRELVY) 100 mg tablet Start 1 tablet (ubrelvy 100 mg) for headache attacks. May repeat once in 1 hours to a max of 2 per day    ZOLMitriptan (ZOMIG) 5 mg Spry Use 1 spray in one nostril as needed for migraine. May repeat once in 2 hours. No more than 2 days/week.         Objective:     Vitals:    06/24/24 0926   BP: 111/70   Pulse: 96   Weight: 95.1 kg (209 lb 10.5 oz)   Height: 5' 9.02" (1.753 m)   PainSc:   2      Body mass index is 30.95 kg/m².     Physical Examinations:  Physical Exam   Constitutional: She is oriented to person, place, and time. She appears distressed.   HENT:   Head: Normocephalic and atraumatic.   Eyes: Pupils are equal, round, and reactive to light. Right eye exhibits no discharge. Left eye exhibits no discharge.   Neck: No thyromegaly present.   Cardiovascular: Normal rate, regular rhythm and normal heart sounds. Exam reveals no gallop and no friction rub.   No murmur heard.  Pulmonary/Chest: Breath sounds normal. She has no wheezes. She has no rales. She exhibits no tenderness.   Abdominal: There is no abdominal tenderness. There is no rebound and no guarding.   Musculoskeletal:         General: Tenderness present.      Right shoulder: Tenderness " present.      Left shoulder: Tenderness present.      Right elbow: Normal.      Left elbow: Tenderness present.      Right wrist: Tenderness present.      Left wrist: Tenderness present.      Cervical back: Neck supple.      Right knee: Effusion present. Tenderness present.      Left knee: Effusion present. Tenderness present.   Lymphadenopathy:     She has no cervical adenopathy.   Neurological: She is alert and oriented to person, place, and time. Gait normal.   Skin: Skin is dry. No rash noted. No erythema. There is pallor.   Psychiatric: Mood and affect normal.   Vitals reviewed.      Right Side Rheumatological Exam     Examination finds the elbow normal.    The patient is tender to palpation of the shoulder, wrist, knee, 1st PIP, 1st MCP, 2nd PIP, 2nd MCP, 3rd PIP, 3rd MCP, 4th PIP, 4th MCP, 5th PIP and 5th MCP    She has swelling of the 1st PIP, 1st MCP, 2nd PIP, 2nd MCP, 3rd PIP, 3rd MCP, 4th PIP, 4th MCP, 5th PIP and 5th MCP    Shoulder Exam   Tenderness Location: no tenderness    Range of Motion   Active abduction:  abnormal   Adduction: abnormal  Sensation: normal    Knee Exam   Patellofemoral Crepitus: positive  Effusion: positive  Sensation: normal    Hip Exam   Tenderness Location: posterior  Sensation: normal    Elbow/Wrist Exam   Tenderness Location: no tenderness  Sensation: normal    Left Side Rheumatological Exam     The patient is tender to palpation of the shoulder, elbow, wrist, knee, 1st PIP, 1st MCP, 2nd PIP, 2nd MCP, 3rd PIP, 3rd MCP, 4th PIP, 4th MCP, 5th PIP and 5th MCP.    She has swelling of the 1st PIP, 1st MCP, 2nd PIP, 2nd MCP, 3rd PIP, 3rd MCP, 4th PIP, 4th MCP, 5th PIP and 5th MCP    Shoulder Exam   Tenderness Location: no tenderness    Range of Motion   Active abduction:  abnormal   Sensation: normal    Knee Exam     Patellofemoral Crepitus: positive  Effusion: positive  Sensation: normal    Hip Exam   Tenderness Location: posterior  Sensation: normal    Elbow/Wrist Exam    Sensation: normal      Back/Neck Exam   General Inspection   Gait: normal            Disease Assessment Scores:  Patient's Global Assessment of arthritis (0-10): 2  Physician's Global Assessment of arthritis (0-10): 2  Number of Tender Joints (0-28): 2  Number of Swollen Joints (0-28): 2        2/5/2024     8:39 AM   Rapid3 Question Responses and Scores   MDHAQ Score 2   Psychologic Score 7.7   Pain Score 6   When you awakened in the morning OVER THE LAST WEEK, did you feel stiff? Yes   If Yes, please indicate the number of hours until you are as limber as you will be for the day 8   Fatigue Score 10   Global Health Score 10   RAPID3 Score 7.56       Monitoring Lab Results:  Lab Results   Component Value Date    WBC 8.16 02/07/2024    RBC 5.37 02/07/2024    HGB 15.7 02/07/2024    HCT 46.9 02/07/2024    MCV 87 02/07/2024    MCH 29.2 02/07/2024    MCHC 33.5 02/07/2024    RDW 13.0 02/07/2024     02/07/2024        Lab Results   Component Value Date     02/07/2024    K 3.5 02/07/2024     02/07/2024    CO2 22 (L) 02/07/2024    GLU 94 02/07/2024    BUN 6 02/07/2024    CREATININE 0.8 02/07/2024    CALCIUM 9.3 02/07/2024    PROT 6.9 02/07/2024    ALBUMIN 4.1 02/07/2024    BILITOT 1.1 (H) 02/07/2024    ALKPHOS 47 (L) 02/07/2024    AST 17 02/07/2024    ALT 24 02/07/2024    ANIONGAP 9 02/07/2024    EGFRNORACEVR >60.0 02/07/2024       Lab Results   Component Value Date    SEDRATE <2 02/07/2024    CRP 1.6 02/07/2024        Lab Results   Component Value Date    KKKHKHUC22WH 42 06/10/2022    YXYNHBNR20 >2000 (H) 06/03/2021        Lab Results   Component Value Date    CHOL 200 (H) 06/10/2022    HDL 74 06/10/2022    LDLCALC 111.0 06/10/2022    TRIG 75 06/10/2022       Lab Results   Component Value Date    CCPANTIBODIE <0.5 06/03/2021     Lab Results   Component Value Date    ANASCREEN Negative <1:80 06/10/2022    ANATITER 1:80 04/06/2021    DSDNA Negative 1:10 04/06/2021     No results found for:  ""HLABB27"    Infectious Disease Screening:  Lab Results   Component Value Date    HEPBSAG Non-reactive 02/07/2024    HEPBCAB Non-reactive 02/07/2024    HEPBSAB 88.40 02/07/2024    HEPBSAB Reactive 02/07/2024    HEPBSURFABQU POSITIVE 02/07/2024    HEPBSURFABQU 96 02/07/2024     Lab Results   Component Value Date    HEPCAB Non-reactive 02/07/2024     Lab Results   Component Value Date    TBGOLDPLUS Positive (A) 02/07/2024   TB spot neg  No results found for: "QUANTIFERON", "SVCMT", "QUANTAGVALUE", "QUANTNILVALU", "QUANTMITOGEN", "QFTTBAG", "QINT"     Imaging: DEXA, Xrays, MRIs, CTs, etc    Old & Outside Medical Records:  Reviewed old and all outside medical records available in Care Everywhere     Assessment:     Encounter Diagnoses   Name Primary?    PSA (psoriatic arthritis) Yes    MCTD (mixed connective tissue disease)     Hashimoto's thyroiditis     CVID (common variable immunodeficiency)     Non-celiac gluten sensitivity        Plan:      Encounter Diagnoses   Name Primary?    PSA (psoriatic arthritis) Yes    MCTD (mixed connective tissue disease)     Hashimoto's thyroiditis     CVID (common variable immunodeficiency)     Non-celiac gluten sensitivity      Loren was seen today for disease management.    Diagnoses and all orders for this visit:    PSA (psoriatic arthritis)  -     PHARMACOGENOMICS PANEL; Future  -     upadacitinib (RINVOQ) 15 mg 24 hr tablet; Take 1 tablet (15 mg total) by mouth once daily.  -     Sedimentation rate; Future  -     C-Reactive Protein; Future  -     Comprehensive Metabolic Panel; Future  -     CBC Auto Differential; Future    MCTD (mixed connective tissue disease)  -     PHARMACOGENOMICS PANEL; Future  -     Sedimentation rate; Future  -     C-Reactive Protein; Future  -     Comprehensive Metabolic Panel; Future  -     CBC Auto Differential; Future    Hashimoto's thyroiditis  -     PHARMACOGENOMICS PANEL; Future  -     Sedimentation rate; Future  -     C-Reactive Protein; " Future  -     Comprehensive Metabolic Panel; Future  -     CBC Auto Differential; Future    CVID (common variable immunodeficiency)  -     Sedimentation rate; Future  -     C-Reactive Protein; Future  -     Comprehensive Metabolic Panel; Future  -     CBC Auto Differential; Future    Non-celiac gluten sensitivity  -     Sedimentation rate; Future  -     C-Reactive Protein; Future  -     Comprehensive Metabolic Panel; Future  -     CBC Auto Differential; Future        1. Psa/ mctd dc MTX DC imuran start  Jak1 inhibitor  2. Pt may need to take a short leave when having  a flare  3. Check labs     Follow-up 4 months  More than 50% of the  40 minute encounter was spent face to face counseling the patient regarding current status and future plan of care as well as side effects  of the medications. All questions were answered to patient's satisfaction also includes  non-face to face time preparing to see the patient (eg, review of tests), Obtaining and/or reviewing separately obtained history, Documenting clinical information in the electronic or other health record, Independently interpreting results

## 2024-07-01 ENCOUNTER — CLINICAL SUPPORT (OUTPATIENT)
Dept: REHABILITATION | Facility: HOSPITAL | Age: 49
End: 2024-07-01
Attending: PSYCHIATRY & NEUROLOGY
Payer: COMMERCIAL

## 2024-07-01 DIAGNOSIS — M47.812 ARTHROPATHY OF CERVICAL FACET JOINT: ICD-10-CM

## 2024-07-01 DIAGNOSIS — M53.82 DECREASED ROM OF INTERVERTEBRAL DISCS OF CERVICAL SPINE: ICD-10-CM

## 2024-07-01 DIAGNOSIS — M54.2 NECK PAIN: Primary | ICD-10-CM

## 2024-07-01 PROCEDURE — 97140 MANUAL THERAPY 1/> REGIONS: CPT | Mod: PO | Performed by: PHYSICAL THERAPIST

## 2024-07-01 PROCEDURE — 97161 PT EVAL LOW COMPLEX 20 MIN: CPT | Mod: PO | Performed by: PHYSICAL THERAPIST

## 2024-07-01 NOTE — PATIENT INSTRUCTIONS
HOME EXERCISE PROGRAM  Created by Diallo Carlisle  Jul 1st, 2024  View videos at [email or web address]        SCAPULAR RETRACTIONS    Move your shoulder blades back and down. Hold, relax and repeat. Repeat 10 Times   Hold 2 Seconds   Complete 2 Sets   Perform 2 Times a Day          CERVICAL RETRACTION / CHIN TUCK    Slowly draw your head back so that your ears line up with your shoulders.    Video # [id number] Repeat 10 Times   Hold 2 Seconds   Complete 2 Sets   Perform 2 Times a Day          CERVICAL ROTATION    Turn your head towards the side, then return back to looking straight ahead.    Video # [id number] Repeat 10 Times   Hold 2 Seconds   Complete 2 Sets   Perform 2 Times a Day          Thoracic Extension    Sitting on a chair with the top hitting your mid back, place your hands crossed on your shoulders. Slowly tilt back so you are stretching your mid back. Hold for 20-30 seconds. Release and repeat. Repeat 10 Times   Hold 3 Seconds   Complete 2 Sets   Perform 2 Times a Day

## 2024-07-01 NOTE — PLAN OF CARE
[facility]   Physical Therapy Initial Evaluation      Name: Loren Lemus  [facility] Number: [id number]    Therapy Diagnosis:   Encounter Diagnoses   Name Primary?    Arthropathy of cervical facet joint     Neck pain Yes    Decreased ROM of intervertebral discs of cervical spine         Physician: Guadalupe Hill MD    Physician Orders: PT Eval and Treat   Medical Diagnosis from Referral:   Arthropathy of cervical facet joint  Evaluation Date: 7/1/2024  Authorization Period Expiration: 06/18/2025  Plan of Care Expiration: 08/30/2024  Progress Note Due: 08/02/2024  Date of Surgery: none  Visit # / Visits authorized: 1   FOTO: 1/ 3    Precautions: Standard and Immunosuppression, latex    Time In: 0900  Time Out: 1000  Total Billable Time: 60 minutes    Subjective     Date of onset: 06/24/2024    History of current condition - Loren reports: history of HA, migraines, RA noted with ongoing neck pain, mid-scapular pain, muscle tension at multiple areas as well. Patient reported interest in dry needling as well. No radicular pain at this time. No trauma noted. Neck pain is localized, intermittent noted.    Falls: none    Imaging: MRI studies: 01/23/2018  1. Minimal/mild multilevel cervical spondylosis is seen without significant overall central spinal canal stenosis or neural foraminal stenosis..     Prior Therapy: none noted  Social History:  lives with their family  Occupation: Full time Surgical Nurse  Prior Level of Function: independent  Current Level of Function: modified independent, increase time noted with home management    Pain:  Current 2/10, worst 6/10, best 0/10   Location: bilateral neck/Head   Description: Aching, Dull, Tight, and Variable  Aggravating Factors: variable positions  Easing Factors: injection and change in position    Patients goals: Decrease pain to neck and relieve muscle tension between her shoulder blades.     Medical History:   Past Medical History:  "  Diagnosis Date    Allergy     Asthma     Endometriosis     Kidney stone     x 1    Migraine     Miscarriage     MVP (mitral valve prolapse)        Surgical History:   Loren Lemus  has a past surgical history that includes Pelvic laparoscopy; Knee surgery (Left); Septorhinoplasty (1988); Breast surgery (Bilateral); Tonsillectomy; Tympanostomy tube placement; and Sinus surgery.    Medications:   Loren has a current medication list which includes the following prescription(s): blunt needle, disposable, cetirizine, duloxetine, aimovig autoinjector, insulin syringe-needle u-100, ketorolac, methotrexate, methotrexate (pf), naproxen sodium, easy touch hypodermic needle, ondansetron, ondansetron, pramipexole, safety needles, syringe (disposable), thyroid (pork), tirzepatide, topiramate, ubrelvy, rinvoq, and zolmitriptan, and the following Facility-Administered Medications: onabotulinumtoxina, onabotulinumtoxina, and onabotulinumtoxina.    Allergies:   Review of patient's allergies indicates:   Allergen Reactions    Codeine Hives    Covid-19 vacc,mrna(pfizer)(pf) Anaphylaxis     To influenza    Flu vaccine [id number](6mos up) Anaphylaxis    Latex, natural rubber Swelling    Oxycodone Hives        Objective        Posture  Sitting: -  Standing: -  Protruded Head +  Wry neck -  Correction of posture better  Relevant yes    Neurological:  Sensation: Dermatomes:   Right Left Comment   C2/C3 (posterior head/neck) intact intact    C4 intact intact    C5 intact intact    C6 intact intact    C7 intact intact    C8 intact intact    T1 intact intact      DTR:   Right Left Comment   Biceps (C5-6) 2+ 2+    Triceps (C7) 2+ 2+                 Special Tests:  Distraction -   Spurlings  -   Cervical rotation < 60 degrees to affected side    Bakodys Sign -          -   MVA/trauma  Sharp-Juan A   Alar integrity Y/N  -  -   VA test   Rot/ext x 10" -       UMN:   Ataxic gait -  Horn's: -    Upper Limb Neurodynamic testing:  ULTT: " "(Check Median). Good screen for radiculopathy -    Neck pain with headaches, or movement coordination Impairments:  CCFT(cranio cervical flexion test 26mmhg to 30mmhg is then norm):   Deep neck flexor test: (male 39", female 29.4" without neck pain)  FRT (38-45 degrees norm). If patient is less than 45, check AA. Less than 32 deg, Cervicogenic related.    Upper Extremity Strength  (R) UE  (L) UE    C4 Upper trap 5/5 C4 Upper trap 5/5   C5 Deltoid(90 ABD): 5/5 C5 Deltoid (90 ABD): 5/5   C6 Biceps/ECRB/L 5/5 C6 Biceps/ECRB/L 5/5   C7 triceps/FCR 5/5 C7 triceps/FCR 5/5   C8 Abd.pollicis ca 5/5 C8 Abd. Pollicis ca. 5/5   T1 First Dorsal inter 5/5 T1 First Dorsal inter 5/5                                                                                                                                                                                                                          Cervical AROM:                          Movement Loss                     Pain/Dysfunction                     Protusion  Flexion  Retraction 25%  25%  50% Can't touch sternum to chin. Non-uniform curvature   Extension 50% Non-uniform curvature. Not within 10 degrees of parallel of the horizontal line   Lateral flexion (L)     Lateral flexion (R)     Right rotation 56 deg Chin/nose not in line with mid-clavicle. +/- 80 degrees   Left rotation 67 deg Chin/nose not in line with mid-clavicle. +/- 80 degrees     OA -  AA -    Repeated Movements:                          Static Tests:  Protrusion  Retraction  Flexion  Extension: sitting/prone/supine     Joint Mobility: lower-c-spine hypomobille noted,    PA's ,    Transverse glides ,      Thoracic mobility: decreased upper T-spine    Palpation: point tenderness to bilateral UT, levator scap, bilateral rhomboids      Flexibility: pec-minor     rimary Measure Score Range Intake Score Score Interpretation  % - 0% 56.0 Higher Score = Greater Disability      Treatment     Total Treatment " time (time-based codes) separate from Evaluation: 8 minutes     Loren received the treatments listed below:      manual therapy techniques: Joint mobilizations, Manual traction, Myofacial release, and Soft tissue Mobilization were applied to the: cervical for 8 minutes, including:  Supine: cervical roll, HEP  Supine: sub-occipital inhibitive distraction  Warren-fascial to lower c-spine    Cervical retraction: decreased, better noted.    Patient Education and Home Exercises     Education provided:   - Yes    Written Home Exercises Provided: yes. Exercises were reviewed and Loren was able to demonstrate them prior to the end of the session.  Loren demonstrated good  understanding of the education provided. See EMR under Patient Instructions for exercises provided during therapy sessions.    Assessment     Loren is a 48 y.o. female referred to outpatient Physical Therapy with a medical diagnosis of Arthropathy of cervical facet joint. Patient presents with neck pain, decreases cervical/upper thoracis mobility, extensive tissue dysfunction with increased tone, tenderness upon palpation.    Patient prognosis is Good.   Patient will benefit from skilled outpatient Physical Therapy to address the deficits stated above and in the chart below, provide patient /family education, and to maximize patientt's level of independence.     Plan of care discussed with patient: Yes  Patient's spiritual, cultural and educational needs considered and patient is agreeable to the plan of care and goals as stated below:     Anticipated Barriers for therapy: none    Medical Necessity is demonstrated by the following  History  Co-morbidities and personal factors that may impact the plan of care [] LOW: no personal factors / co-morbidities  [x] MODERATE: 1-2 personal factors / co-morbidities  [] HIGH: 3+ personal factors / co-morbidities    Moderate / High Support Documentation:   Co-morbidities affecting plan of care: RA,  migraine/HA    Personal Factors:   no deficits     Examination  Body Structures and Functions, activity limitations and participation restrictions that may impact the plan of care [] LOW: addressing 1-2 elements  [] MODERATE: 3+ elements  [x] HIGH: 4+ elements (please support below)    Moderate / High Support Documentation: ROM, strength, muscle tenderness, stability motor control, joint mobility, home management, work related tasks     Clinical Presentation [x] LOW: stable  [] MODERATE: Evolving  [] HIGH: Unstable     Decision Making/ Complexity Score: low       Goals:  Short Term Goals: 4 weeks   Independent with HEP with < 3/10 pain.  Improve cervical retraction by 25% for home management.  Improve cervical rotation > 5 degrees for driving, mobility purposes.  Report improvement in sleep cycle by 1 hr for functional mobility purposes.    Long Term Goals: 8 weeks   Independent with HEP with no painful limitations.  Demonstrate cervical rotation > 60 degrees for mobility purposes.  Demonstrate cervical retraction/extension  x 10 with no pain barriers.  Demonstrate cervical flexion x 10 with no reproducible s/s.  Report less difficulty with work related tasks.    Plan     Plan of care Certification: 7/1/2024 to 08/30/2024.    Outpatient Physical Therapy 2 times weekly for 8 weeks to include the following interventions: Electrical Stimulation IFC, Manual Therapy, Moist Heat/ Ice, Neuromuscular Re-ed, Patient Education, Therapeutic Activities, and Therapeutic Exercise, dry needling    Diallo Carlisle PT        Physician's Signature: _________________________________________ Date: ________________

## 2024-07-08 ENCOUNTER — CLINICAL SUPPORT (OUTPATIENT)
Dept: REHABILITATION | Facility: HOSPITAL | Age: 49
End: 2024-07-08
Payer: COMMERCIAL

## 2024-07-08 DIAGNOSIS — M53.82 DECREASED ROM OF INTERVERTEBRAL DISCS OF CERVICAL SPINE: ICD-10-CM

## 2024-07-08 DIAGNOSIS — M54.2 NECK PAIN: Primary | ICD-10-CM

## 2024-07-08 PROCEDURE — 97112 NEUROMUSCULAR REEDUCATION: CPT | Mod: PO | Performed by: PHYSICAL THERAPIST

## 2024-07-08 PROCEDURE — 97140 MANUAL THERAPY 1/> REGIONS: CPT | Mod: PO | Performed by: PHYSICAL THERAPIST

## 2024-07-08 NOTE — PROGRESS NOTES
[facility]   Physical Therapy Treatment Note     Name: Loren Lemus  [facility] Number: [id number]    Therapy Diagnosis:   Encounter Diagnoses   Name Primary?    Neck pain Yes    Decreased ROM of intervertebral discs of cervical spine      Physician: Guadalupe Hill MD    Visit Date: 7/8/2024  Physician Orders: PT Eval and Treat   Medical Diagnosis from Referral:   Arthropathy of cervical facet joint  Evaluation Date: 7/1/2024  Authorization Period Expiration: 06/18/2025  Plan of Care Expiration: 08/30/2024  Progress Note Due: 08/02/2024  Date of Surgery: none  Visit # / Visits authorized: 1   FOTO: 1/ 3     Precautions: Standard and Immunosuppression, latex     Time In: 1458  Time Out: 1550  Total Billable Time: 40 minutes    SUBJECTIVE     Pt reports: having muscle tension noted. Less neck pain and has been using cervical roll. No HA noted. No migraine at this time.  She was compliant with home exercise program.  Response to previous treatment: muscle tension noted  Functional change: less muscle soreness/tension.    Pain: 2/10  Location: bilateral neck pain      OBJECTIVE     Objective Measures updated at progress report unless specified.   Cervical AROM:                          Movement Loss                     Pain/Dysfunction                     Protusion  Flexion  Retraction 25%  25%  50% Can't touch sternum to chin. Non-uniform curvature   Extension 50% Non-uniform curvature. Not within 10 degrees of parallel of the horizontal line   Lateral flexion (L)       Lateral flexion (R)       Right rotation 56 deg Chin/nose not in line with mid-clavicle. +/- 80 degrees   Left rotation 67 deg Chin/nose not in line with mid-clavicle. +/- 80 degrees      OA -  AA -     Repeated Movements:                          Static Tests:  Protrusion  Retraction  Flexion  Extension: sitting/prone/supine     Joint Mobility: lower-c-spine hypomobille noted,    PA's ,    Transverse glides ,      "  Thoracic mobility: decreased upper T-spine     Palpation: point tenderness to bilateral UT, levator scap, bilateral rhomboids     Treatment     Loren received the treatments listed below:      therapeutic exercises to develop strength, endurance, ROM, flexibility, posture, and core stabilization for 00 minutes including:    manual therapy techniques: Joint mobilizations, Manual traction, Myofacial release, and Soft tissue Mobilization were applied to the: cervical/thoracic region for 25 minutes, including:  Supine: sub-occipital inhibitive distraction, traction  Myofascial to lower cervical spine  See EMR under MEDIA for written consent provided 7/8/2024.     Palpation Assessment to determine the necessity for Functional Dry Needling  Yes (6).     Loren received the following manual therapy techniques: see above     neuromuscular re-education activities to improve: Sense and Posture for 15 minutes. The following activities were included:  Seated: cervical retraction 3/20", f/b 10 x 2" hold  Cervical retraction/rotation 2/10  Seated: thoracic rotation with cervical rotation 1/10 each    Patient Education and Home Exercises     Home Exercises Provided and Patient Education Provided     Education provided:   - yes    Written Home Exercises Provided: Patient instructed to cont prior HEP. Exercises were reviewed and Loren was able to demonstrate them prior to the end of the session.  Loren demonstrated good  understanding of the education provided. See EMR under Patient Instructions for exercises provided during therapy sessions    ASSESSMENT     Patient was given cueing on upright posture with cervical mobility noted. Patient demonstrated good tolerance with manual and NMR progression.  Patient demonstrated appropriate response to Functional Dry Needling. good  rhythmical contractions observed with estim to treated muscle groups. Sub-occipital, levator scap, UT . Right UT muscle twitch noted but was " fatigued out with estim noted. No adverse effects.    Loren Is progressing well towards her goals.   Pt prognosis is Good.     Pt will continue to benefit from skilled outpatient physical therapy to address the deficits listed in the problem list box on initial evaluation, provide pt/family education and to maximize pt's level of independence in the home and community environment.     Pt's spiritual, cultural and educational needs considered and pt agreeable to plan of care and goals.     Anticipated barriers to physical therapy: none    Goals: Goals:  Short Term Goals: 4 weeks   Independent with HEP with < 3/10 pain.  Improve cervical retraction by 25% for home management.  Improve cervical rotation > 5 degrees for driving, mobility purposes.  Report improvement in sleep cycle by 1 hr for functional mobility purposes.      PLAN     Continue with CHERYL Carlisle, PT

## 2024-07-29 ENCOUNTER — OFFICE VISIT (OUTPATIENT)
Dept: CARDIOLOGY | Facility: CLINIC | Age: 49
End: 2024-07-29
Payer: COMMERCIAL

## 2024-07-29 VITALS
SYSTOLIC BLOOD PRESSURE: 118 MMHG | WEIGHT: 202.38 LBS | BODY MASS INDEX: 29.98 KG/M2 | HEIGHT: 69 IN | DIASTOLIC BLOOD PRESSURE: 82 MMHG

## 2024-07-29 DIAGNOSIS — R00.2 PALPITATIONS: Primary | ICD-10-CM

## 2024-07-29 DIAGNOSIS — R00.0 TACHYCARDIA, UNSPECIFIED: ICD-10-CM

## 2024-07-29 PROCEDURE — 99204 OFFICE O/P NEW MOD 45 MIN: CPT | Mod: S$GLB,,, | Performed by: INTERNAL MEDICINE

## 2024-07-29 PROCEDURE — 3074F SYST BP LT 130 MM HG: CPT | Mod: CPTII,S$GLB,, | Performed by: INTERNAL MEDICINE

## 2024-07-29 PROCEDURE — 99999 PR PBB SHADOW E&M-EST. PATIENT-LVL III: CPT | Mod: PBBFAC,,, | Performed by: INTERNAL MEDICINE

## 2024-07-29 PROCEDURE — 1160F RVW MEDS BY RX/DR IN RCRD: CPT | Mod: CPTII,S$GLB,, | Performed by: INTERNAL MEDICINE

## 2024-07-29 PROCEDURE — 3079F DIAST BP 80-89 MM HG: CPT | Mod: CPTII,S$GLB,, | Performed by: INTERNAL MEDICINE

## 2024-07-29 PROCEDURE — 3008F BODY MASS INDEX DOCD: CPT | Mod: CPTII,S$GLB,, | Performed by: INTERNAL MEDICINE

## 2024-07-29 PROCEDURE — 1159F MED LIST DOCD IN RCRD: CPT | Mod: CPTII,S$GLB,, | Performed by: INTERNAL MEDICINE

## 2024-07-29 NOTE — PROGRESS NOTES
Subjective:    Patient ID:  Loren Lemus is a 48 y.o. female who presents for evaluation of Irregular Heart Beat      HPI  She comes with lightheadedness dizziness and actually episodes of falling mostly at night, when she feels her heart rate goes up blood pressure goes down.    She has been diagnosed with a lot of rheumatologic maladies.  No chest pain, no shortness of breath.  No syncope or such.  Blood pressure in the low side, which is normal for her      Review of Systems   Constitutional: Negative for decreased appetite, malaise/fatigue, weight gain and weight loss.   Cardiovascular:  Negative for chest pain, dyspnea on exertion, leg swelling, palpitations and syncope.   Respiratory:  Negative for cough and shortness of breath.    Gastrointestinal: Negative.    Neurological:  Negative for weakness.   All other systems reviewed and are negative.     Objective:      Physical Exam  Vitals and nursing note reviewed.   Constitutional:       Appearance: Normal appearance. She is well-developed.   HENT:      Head: Normocephalic.   Eyes:      Pupils: Pupils are equal, round, and reactive to light.   Neck:      Thyroid: No thyromegaly.      Vascular: No carotid bruit or JVD.   Cardiovascular:      Rate and Rhythm: Normal rate and regular rhythm.      Chest Wall: PMI is not displaced.      Pulses: Normal pulses and intact distal pulses.      Heart sounds: Normal heart sounds. No murmur heard.     No gallop.   Pulmonary:      Effort: Pulmonary effort is normal.      Breath sounds: Normal breath sounds.   Abdominal:      Palpations: Abdomen is soft. There is no mass.      Tenderness: There is no abdominal tenderness.   Musculoskeletal:         General: Normal range of motion.      Cervical back: Normal range of motion and neck supple.   Skin:     General: Skin is warm.   Neurological:      Mental Status: She is alert and oriented to person, place, and time.      Sensory: No sensory deficit.      Deep Tendon Reflexes:  Reflexes are normal and symmetric.         Most Recent EKG Results  Results for orders placed or performed during the hospital encounter of 18   EKG 12-lead    Collection Time: 18 10:27 AM    Narrative                             [facility]                                                                                  Test Date:    2018  Pat Name:     KIRILL CARIAS         Department:     Patient ID:   [id number]                 Room:           Gender:       Female                   Technician:   JOSEPH  :          1975               Requested By:   Order Number:                          Reading MD:   Khanh Brannon                                   Measurements  Intervals                              Axis            Rate:         67                       P:            68  FL:           148                      QRS:          1  QRSD:         80                       T:            40  QT:           408                                      QTc:          431                                                                 Interpretive Statements  Normal sinus rhythm with sinus arrhythmia  Normal ECG  No previous ECG available for comparison    Electronically Signed On 3- 12:20:01 CDT by Khanh Brannon         Most Recent Echocardiogram Results  Results for orders placed during the hospital encounter of 18    2D echo with color flow doppler    Narrative  Date of Procedure: 03/10/2018        TEST DESCRIPTION  Technical Quality: This is a technically adequate study.    Aorta: The aortic root is normal in size.    Left Atrium: The left atrial volume index is normal, measuring 18.51 cc/m2.    Left Ventricle: The left ventricle is normal in size, with an end-diastolic diameter of 4.4 cm, and an end-systolic diameter of 3.1 cm. LV wall thickness is normal, with the septum and the posterior wall each measuring 0.8 cm across. Relative wall  thickness was normal at 0.36, and  the LV mass index was 58.9 g/m2 consistent with normal left ventricular mass. There are no regional wall motion abnormalities. Left ventricular systolic function appears normal. Visually estimated ejection fraction is  55-60%.    Diastolic indices: E wave velocity 0.5 m/s, E/A ratio 1.2,  msec., E/e' ratio(avg) 5. Diastolic function is normal.    Right Atrium: The right atrium is normal in size, measuring 4.1 cm in length and 2.5 cm in width in the apical view.    Right Ventricle: The right ventricle is normal in size. Global right ventricular systolic function appears normal. The estimated PA systolic pressure is 16 mmHg.    Aortic Valve:  Aortic valve is normal in structure with normal leaflet mobility.    Mitral Valve:  Mitral valve is normal in structure with normal leaflet mobility. There is trivial mitral regurgitation.    Tricuspid Valve:  Tricuspid valve is normal in structure with normal leaflet mobility. There is trivial tricuspid regurgitation.    Pulmonary Valve:  Pulmonary valve is normal in structure with normal leaflet mobility.    IVC: IVC is normal in size and collapses > 50% with a sniff, suggesting normal right atrial pressure of 3 mmHg.    Intracavitary: There is no evidence of pericardial effusion, intracavity mass, thrombi, or vegetation.        CONCLUSIONS  1 - Normal left ventricular systolic function (EF 55-60%).  2 - Trivial mitral regurgitation.  3 - The estimated PA systolic pressure is 16 mmHg.            This document has been electronically  SIGNED BY: Sal Gonzalez MD On: 03/10/2018 12:21      Most Recent Nuclear Stress Test Results  No results found for this or any previous visit.      Most Recent Cardiac PET Stress Test Results  No results found for this or any previous visit.      Most Recent Cardiovascular Angiogram results  No results found for this or any previous visit.      Other Most Recent Cardiology Results  Results for orders placed during the hospital encounter of  03/09/18    CARDIAC MONITORING STRIPS      Labs reviewed    Assessment:       1. Palpitations    2. Tachycardia, unspecified         Plan:     Will get Holter monitor and echocardiogram.  Call with results  Regular exercise program

## 2024-08-08 ENCOUNTER — PATIENT MESSAGE (OUTPATIENT)
Dept: RHEUMATOLOGY | Facility: CLINIC | Age: 49
End: 2024-08-08
Payer: COMMERCIAL

## 2024-08-20 DIAGNOSIS — D84.9 IMMUNE DEFICIENCY DISORDER: ICD-10-CM

## 2024-08-20 DIAGNOSIS — R76.8 ANA POSITIVE: ICD-10-CM

## 2024-08-20 DIAGNOSIS — R76.8 LOW SERUM IGG2 SUBCLASS LEVEL: ICD-10-CM

## 2024-08-20 DIAGNOSIS — M35.1 MCTD (MIXED CONNECTIVE TISSUE DISEASE): ICD-10-CM

## 2024-08-20 DIAGNOSIS — M17.0 PRIMARY OSTEOARTHRITIS OF BOTH KNEES: ICD-10-CM

## 2024-08-20 DIAGNOSIS — D83.9 CVID (COMMON VARIABLE IMMUNODEFICIENCY): ICD-10-CM

## 2024-08-20 DIAGNOSIS — K90.41 NON-CELIAC GLUTEN SENSITIVITY: ICD-10-CM

## 2024-08-20 DIAGNOSIS — R76.8 LOW SERUM IGG4 SUBCLASS LEVEL: ICD-10-CM

## 2024-08-20 DIAGNOSIS — E06.3 HASHIMOTO'S THYROIDITIS: ICD-10-CM

## 2024-08-20 RX ORDER — THYROID 15 MG/1
15 TABLET ORAL
Qty: 30 TABLET | Refills: 11 | Status: CANCELLED | OUTPATIENT
Start: 2024-08-20 | End: 2025-08-20

## 2024-08-21 DIAGNOSIS — D83.9 CVID (COMMON VARIABLE IMMUNODEFICIENCY): ICD-10-CM

## 2024-08-21 DIAGNOSIS — R76.8 LOW SERUM IGG2 SUBCLASS LEVEL: ICD-10-CM

## 2024-08-21 DIAGNOSIS — D84.9 IMMUNE DEFICIENCY DISORDER: ICD-10-CM

## 2024-08-21 DIAGNOSIS — E06.3 HASHIMOTO'S THYROIDITIS: ICD-10-CM

## 2024-08-21 DIAGNOSIS — M35.1 MCTD (MIXED CONNECTIVE TISSUE DISEASE): ICD-10-CM

## 2024-08-21 DIAGNOSIS — R76.8 LOW SERUM IGG4 SUBCLASS LEVEL: ICD-10-CM

## 2024-08-21 DIAGNOSIS — R76.8 ANA POSITIVE: ICD-10-CM

## 2024-08-21 DIAGNOSIS — K90.41 NON-CELIAC GLUTEN SENSITIVITY: ICD-10-CM

## 2024-08-21 DIAGNOSIS — M17.0 PRIMARY OSTEOARTHRITIS OF BOTH KNEES: ICD-10-CM

## 2024-08-21 RX ORDER — THYROID 15 MG/1
15 TABLET ORAL
Qty: 30 TABLET | Refills: 11 | Status: SHIPPED | OUTPATIENT
Start: 2024-08-21 | End: 2025-08-21

## 2024-08-26 ENCOUNTER — HOSPITAL ENCOUNTER (OUTPATIENT)
Dept: CARDIOLOGY | Facility: HOSPITAL | Age: 49
Discharge: HOME | End: 2024-08-26
Attending: INTERNAL MEDICINE
Payer: COMMERCIAL

## 2024-08-26 VITALS — HEIGHT: 69 IN | WEIGHT: 202 LBS | BODY MASS INDEX: 29.92 KG/M2

## 2024-08-26 DIAGNOSIS — R00.2 PALPITATIONS: ICD-10-CM

## 2024-08-26 DIAGNOSIS — R00.0 TACHYCARDIA, UNSPECIFIED: ICD-10-CM

## 2024-08-26 LAB
ASCENDING AORTA: 2.86 CM
AV INDEX (PROSTH): 0.76
AV MEAN GRADIENT: 5 MMHG
AV PEAK GRADIENT: 9 MMHG
AV VALVE AREA BY VELOCITY RATIO: 2.54 CM²
AV VALVE AREA: 2.58 CM²
AV VELOCITY RATIO: 0.75
BSA FOR ECHO PROCEDURE: 2.11 M2
CV ECHO LV RWT: 0.46 CM
DOP CALC AO PEAK VEL: 1.5 M/S
DOP CALC AO VTI: 28.2 CM
DOP CALC LVOT AREA: 3.4 CM2
DOP CALC LVOT DIAMETER: 2.08 CM
DOP CALC LVOT PEAK VEL: 1.12 M/S
DOP CALC LVOT STROKE VOLUME: 72.68 CM3
DOP CALCLVOT PEAK VEL VTI: 21.4 CM
E WAVE DECELERATION TIME: 149.58 MSEC
E/A RATIO: 0.91
E/E' RATIO: 8.35 M/S
ECHO LV POSTERIOR WALL: 0.89 CM (ref 0.6–1.1)
FRACTIONAL SHORTENING: 36 % (ref 28–44)
INTERVENTRICULAR SEPTUM: 0.94 CM (ref 0.6–1.1)
IVRT: 66.6 MSEC
LEFT ATRIUM AREA SYSTOLIC (APICAL 2 CHAMBER): 12.49 CM2
LEFT ATRIUM AREA SYSTOLIC (APICAL 4 CHAMBER): 9.48 CM2
LEFT ATRIUM SIZE: 2.73 CM
LEFT ATRIUM VOLUME INDEX MOD: 11.1 ML/M2
LEFT ATRIUM VOLUME MOD: 22.91 CM3
LEFT INTERNAL DIMENSION IN SYSTOLE: 2.46 CM (ref 2.1–4)
LEFT VENTRICLE DIASTOLIC VOLUME INDEX: 30.72 ML/M2
LEFT VENTRICLE DIASTOLIC VOLUME: 63.6 ML
LEFT VENTRICLE END SYSTOLIC VOLUME APICAL 2 CHAMBER: 31.88 ML
LEFT VENTRICLE END SYSTOLIC VOLUME APICAL 4 CHAMBER: 15.96 ML
LEFT VENTRICLE MASS INDEX: 51 G/M2
LEFT VENTRICLE SYSTOLIC VOLUME INDEX: 10.3 ML/M2
LEFT VENTRICLE SYSTOLIC VOLUME: 21.42 ML
LEFT VENTRICULAR INTERNAL DIMENSION IN DIASTOLE: 3.84 CM (ref 3.5–6)
LEFT VENTRICULAR MASS: 105.15 G
LV LATERAL E/E' RATIO: 7.1 M/S
LV SEPTAL E/E' RATIO: 10.14 M/S
LVED V (TEICH): 63.6 ML
LVES V (TEICH): 21.42 ML
LVOT MG: 3.07 MMHG
LVOT MV: 0.84 CM/S
MV PEAK A VEL: 0.78 M/S
MV PEAK E VEL: 0.71 M/S
MV STENOSIS PRESSURE HALF TIME: 43.38 MS
MV VALVE AREA P 1/2 METHOD: 5.07 CM2
OHS CV RV/LV RATIO: 0.86 CM
PISA TR MAX VEL: 1.91 M/S
PULM VEIN S/D RATIO: 1.53
PV PEAK D VEL: 0.43 M/S
PV PEAK S VEL: 0.66 M/S
RA PRESSURE ESTIMATED: 3 MMHG
RA VOL SYS: 16.04 ML
RIGHT ATRIAL AREA: 8.5 CM2
RIGHT ATRIUM VOLUME AREA LENGTH APICAL 4 CHAMBER: 15.06 ML
RIGHT VENTRICLE DIASTOLIC LENGTH: 7.2 CM
RIGHT VENTRICLE DIASTOLIC MID DIMENSION: 2.2 CM
RIGHT VENTRICULAR END-DIASTOLIC DIMENSION: 3.3 CM
RIGHT VENTRICULAR LENGTH IN DIASTOLE (APICAL 4-CHAMBER VIEW): 7.19 CM
RV MID DIAMA: 2.23 CM
RV TB RVSP: 5 MMHG
RV TISSUE DOPPLER FREE WALL SYSTOLIC VELOCITY 1 (APICAL 4 CHAMBER VIEW): 15.55 CM/S
SINUS: 2.7 CM
STJ: 2.54 CM
TDI LATERAL: 0.1 M/S
TDI SEPTAL: 0.07 M/S
TDI: 0.09 M/S
TR MAX PG: 15 MMHG
TRICUSPID ANNULAR PLANE SYSTOLIC EXCURSION: 1.67 CM
TV REST PULMONARY ARTERY PRESSURE: 18 MMHG
Z-SCORE OF LEFT VENTRICULAR DIMENSION IN END DIASTOLE: -4.94
Z-SCORE OF LEFT VENTRICULAR DIMENSION IN END SYSTOLE: -3.54

## 2024-08-26 PROCEDURE — 93306 TTE W/DOPPLER COMPLETE: CPT | Mod: PO

## 2024-08-26 PROCEDURE — 93306 TTE W/DOPPLER COMPLETE: CPT | Mod: 26,,, | Performed by: INTERNAL MEDICINE

## 2024-08-26 PROCEDURE — 93242 EXT ECG>48HR<7D RECORDING: CPT | Mod: PO

## 2024-08-26 PROCEDURE — 93244 EXT ECG>48HR<7D REV&INTERPJ: CPT | Mod: ,,, | Performed by: INTERNAL MEDICINE

## 2024-08-26 PROCEDURE — 93243 EXT ECG>48HR<7D SCAN A/R: CPT | Mod: PO

## 2024-08-30 ENCOUNTER — TELEPHONE (OUTPATIENT)
Dept: CARDIOLOGY | Facility: CLINIC | Age: 49
End: 2024-08-30
Payer: COMMERCIAL

## 2024-08-30 LAB
OHS CV EVENT MONITOR DAY: 0
OHS CV HOLTER LENGTH DECIMAL HOURS: 48
OHS CV HOLTER LENGTH HOURS: 48
OHS CV HOLTER LENGTH MINUTES: 0
OHS CV HOLTER SINUS AVERAGE HR: 74
OHS CV HOLTER SINUS MAX HR: 148
OHS CV HOLTER SINUS MIN HR: 45

## 2024-08-30 NOTE — TELEPHONE ENCOUNTER
Attempted to call pt. No answer, left msg.    ----- Message from Flip De La Rosa MD sent at 8/30/2024  8:10 AM CDT -----  Holter monitor within normal limits

## 2024-09-10 ENCOUNTER — PROCEDURE VISIT (OUTPATIENT)
Dept: NEUROLOGY | Facility: CLINIC | Age: 49
End: 2024-09-10
Payer: COMMERCIAL

## 2024-09-10 VITALS
BODY MASS INDEX: 29.91 KG/M2 | HEIGHT: 69 IN | DIASTOLIC BLOOD PRESSURE: 83 MMHG | TEMPERATURE: 97 F | SYSTOLIC BLOOD PRESSURE: 121 MMHG | WEIGHT: 201.94 LBS | RESPIRATION RATE: 17 BRPM | HEART RATE: 76 BPM

## 2024-09-10 DIAGNOSIS — G43.711 INTRACTABLE CHRONIC MIGRAINE WITHOUT AURA AND WITH STATUS MIGRAINOSUS: Primary | ICD-10-CM

## 2024-09-10 PROCEDURE — 64615 CHEMODENERV MUSC MIGRAINE: CPT | Mod: S$GLB,,, | Performed by: PSYCHIATRY & NEUROLOGY

## 2024-09-10 NOTE — PROCEDURES
ProceduresBOTOX PROCEDURE    PROCEDURE PERFORMED: Botulinum toxin injection (16228)    CLINICAL INDICATION: G43.719    A time out was conducted just before the start of the procedure to verify the correct patient and procedure, procedure location, and all relevant critical information.   The patient meets criteria for chronic headaches according to the ICHD-II, the patient has more than 15 headaches a month out of at least 8 are migraines which last for more than 4 hours a day.    The Botox injections have achieved well over 50%  improvement in the patient's symptoms. Migraines have been reduced at least 7 days per month and the number of cumulative hours suffering with headaches has been reduced at least 100 total hours per month. Today she does have a headache indicating that the Botox has worn off. Frequency of treatment is every 3 months unless no response to the treatments, at which time we will discontinue the injections.      DESCRIPTION OF PROCEDURE: After obtaining informed consent and under aseptic technique, a total of 185 units of botulinum toxin type A were injected in the following muscles: Procerus 5 units,  5 units bilaterally, frontalis 20 units, temporalis 20 units bilaterally, Masseters 15 units bilaterally (3 sites, 5 units per site), occipitalis 15 units, upper cervical paraspinals 10 units bilaterally and trapezius 15 units bilaterally. The patient was given a total of 185 units in 37 sites.The patient tolerated the procedure well. There were no complications. The patient was given a prescription for repeat treatment in 3 months.     Unavoidable waste 15 units      Mitchell Hill M.D  Medical Director, Headache and Facial Pain  [facility]

## 2024-09-17 ENCOUNTER — TELEPHONE (OUTPATIENT)
Dept: RHEUMATOLOGY | Facility: CLINIC | Age: 49
End: 2024-09-17
Payer: COMMERCIAL

## 2024-09-17 NOTE — TELEPHONE ENCOUNTER
----- Message from Haylee Walls, Brijesh sent at 9/16/2024  9:36 AM CDT -----  Regarding: Prescription switch  Good morning,     I just made a call to contact Ms. Terry about her rinvoq refill. She states that she stopped taking the medication at the end of August/beginning of September due to numerous SVT episodes. She believes the episodes have gotten worse and more frequent due to the rinvoq. She states that she has attempted to contact the office about this in the beginning of September and has not heard back. If someone could please contact her to discuss her options.     Thank you,     Haylee Walls, PharmD   Clinical Pharmacist  [facility]  [address] [place] [address]  Phone: [phone]  Fax: [phone]

## 2024-09-17 NOTE — TELEPHONE ENCOUNTER
Of note, she has tried/failed AZA, HCQ, MTX, and now Rinvoq. Unclear why TNFi were not tried but will discuss if this could be a potential option for patient. Other potential options - Cosentyx, Taltz, Orencia.    Discussed with Jen Worley PA-C. Patient to stop Rinvoq and needs an appointment to discuss alternative treatment options with either Jen Orr, or me    Staff,  Please reach out to patient to schedule her for an appointment. Thanks!

## 2024-09-23 ENCOUNTER — OFFICE VISIT (OUTPATIENT)
Dept: RHEUMATOLOGY | Facility: CLINIC | Age: 49
End: 2024-09-23
Payer: COMMERCIAL

## 2024-09-23 VITALS
SYSTOLIC BLOOD PRESSURE: 134 MMHG | DIASTOLIC BLOOD PRESSURE: 86 MMHG | BODY MASS INDEX: 30.55 KG/M2 | WEIGHT: 207 LBS | HEART RATE: 120 BPM

## 2024-09-23 DIAGNOSIS — L40.50 PSA (PSORIATIC ARTHRITIS): Primary | ICD-10-CM

## 2024-09-23 DIAGNOSIS — Z51.81 MEDICATION MONITORING ENCOUNTER: ICD-10-CM

## 2024-09-23 DIAGNOSIS — D84.9 IMMUNOCOMPROMISED: ICD-10-CM

## 2024-09-23 DIAGNOSIS — Z71.85 IMMUNIZATION COUNSELING: ICD-10-CM

## 2024-09-23 PROCEDURE — 3075F SYST BP GE 130 - 139MM HG: CPT | Mod: CPTII,S$GLB,, | Performed by: INTERNAL MEDICINE

## 2024-09-23 PROCEDURE — 1160F RVW MEDS BY RX/DR IN RCRD: CPT | Mod: CPTII,S$GLB,, | Performed by: INTERNAL MEDICINE

## 2024-09-23 PROCEDURE — 99999 PR PBB SHADOW E&M-EST. PATIENT-LVL IV: CPT | Mod: PBBFAC,,,

## 2024-09-23 PROCEDURE — 99213 OFFICE O/P EST LOW 20 MIN: CPT | Mod: S$GLB,,, | Performed by: INTERNAL MEDICINE

## 2024-09-23 PROCEDURE — 3008F BODY MASS INDEX DOCD: CPT | Mod: CPTII,S$GLB,, | Performed by: INTERNAL MEDICINE

## 2024-09-23 PROCEDURE — 1159F MED LIST DOCD IN RCRD: CPT | Mod: CPTII,S$GLB,, | Performed by: INTERNAL MEDICINE

## 2024-09-23 PROCEDURE — 3079F DIAST BP 80-89 MM HG: CPT | Mod: CPTII,S$GLB,, | Performed by: INTERNAL MEDICINE

## 2024-09-23 RX ORDER — SECUKINUMAB 150 MG/ML
150 INJECTION SUBCUTANEOUS
Qty: 1 ML | Refills: 11 | Status: SHIPPED | OUTPATIENT
Start: 2024-09-23 | End: 2024-09-25 | Stop reason: ALTCHOICE

## 2024-09-23 RX ORDER — SECUKINUMAB 150 MG/ML
150 INJECTION SUBCUTANEOUS WEEKLY
Qty: 5 ML | Refills: 0 | Status: SHIPPED | OUTPATIENT
Start: 2024-09-23 | End: 2024-09-25 | Stop reason: ALTCHOICE

## 2024-09-23 NOTE — Clinical Note
Cris, this is the patient I mentioned earlier that needs a letter for not being able to receive vaccines. Patient would like to  letter sometime this week if able. Thanks!

## 2024-09-23 NOTE — PROGRESS NOTES
Time: 45 mins  # of DX: 4    Subjective:     Patient ID:  Loren Lemus    Chief Complaint: Discussion of Treatment Options     Rheumatology Provider: Dr. Rasmussen    History of Present Illness:  Pt is a 48 y.o. female who presents to the clinic for discussion of treatment options. This is a new patient to me but has been following with Dr. Rasmussen & Jen Worley PA-C. Patient is presenting with CVID, PsA, non celiac gluten hypersenitivity, hashimoto's thyroiditis, and MCTD.    Patient sent a message to the office that she has been on the Rinvoq but stopped it at the end of August/beginning of September d/t numerous SVT episodes. Patient did not think that Rinvoq caused these issues but has noticed an increase and worsening in SVT events since being on Rinvoq and would like to discuss other options.    Prior Rheum Therapies:   AZA  MTX  HCQ  Rinvoq    PsA:  Last took Rinvoq about 3 weeks ago  Having intense joint pain that affects ability to work and do day to day tasks  Joint pain especially in her hips, legs, and hands  Rated joint pain and stiffness at 6/10 and sometimes it goes up to 7  More so the stiffness that is very bothersome and sometimes causes her to fall often  Having some dizziness while standing and has to sent more often  Stated that Dr. 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  Does have a upcoming procedure; planning for sometime in Nov  Planning to have cosmetic surgery - breast implants removed and possibly new set inserted, 360 lipo, mini tummy tuck    Vaccines:  Recommend influenza annually, prevnar 20 if not completed yet, TdaP every 10 years, shingrix x 2, COVID, Hep B (adults 19-60 yo and 60 or older if have risk factors) and RSV if 60 years old or  "older  Patient is due for Influenza (annually), Prevnar 20 (once), COVID, Shingrix (2 doses 2-6 months apart once in lifetime), and TdaP (every 10 years)  Patient declined the following vaccines: all vaccines    Influenza: Discuss and recommend annually; high dose recommended. Due now. Allergic  PCV 20: Discuss and recommend 1 dose  Tetanus (TdaP): Discuss and recommend booster every 10 years. Due now  Shingrix: Discuss and recommend. 2 dose series 2-6 months apart. Due now  Hepatitis B: Immune  Covid: CDC recommends 3 new 3735-2019 dose in non-vaccinated immunocompromised patients. Allergic    Had a severe reaction to the flu vaccine and since does not get any vaccines. Stated that the COVID vaccines are similar to the Flu and why it was also added a severe allergy. Inquired about Dr. Rasmussen writing her a letter so she does not have to get any vaccines. Works as a nurse [employer]. Notified Dr. Grady Lynch's nurse, who will try to have the letter ready for the patient to  sometime this week    Current Medications:  Current Outpatient Medications   Medication Instructions    AIMOVIG AUTOINJECTOR 140 mg, Subcutaneous, Every 28 days    blunt needle, disposable 18 x 1 1/2 " Ndle Use to inject once a week    cetirizine (ZYRTEC) 10 mg, Oral, 2 times daily    DULoxetine (CYMBALTA) 30 mg, Oral, 2 times daily    insulin syringe-needle U-100 0.5 mL 31 gauge x 5/16" Syrg Use 1 each by Misc.(Non-Drug; Combo Route) route once daily.    ketorolac (TORADOL) 60 mg, Intramuscular, Every 30 days    naproxen sodium (ANAPROX) 550 mg, Oral, 2 times daily    ondansetron (ZOFRAN) 8 mg, Oral, As needed (PRN)    ondansetron (ZOFRAN) 4 mg, Oral, Every 6 hours PRN    pramipexole (MIRAPEX) 0.25 mg, Oral, Nightly    RINVOQ 15 mg, Oral, Daily    safety needles 25 gauge x 1 1/2" Ndle Use to inject every 7 days    syringe, disposable, 1 mL Syrg Use 1 Syringe once a week.    thyroid, pork, (NP THYROID) 15 mg Tab Take 1 tablet (15 mg " total) by mouth daily before breakfast with water    topiramate (TOPAMAX) 50 mg, Oral, 2 times daily    ubrogepant (UBRELVY) 100 mg tablet Start 1 tablet (ubrelvy 100 mg) for headache attacks. May repeat once in 1 hours to a max of 2 per day    ZOLMitriptan (ZOMIG) 5 mg Spry Use 1 spray in one nostril as needed for migraine. May repeat once in 2 hours. No more than 2 days/week.     Objective:     Vitals:    09/23/24 1411   BP: 134/86   Pulse: (!) 120   Weight: 93.9 kg (207 lb)   PainSc:   4   PainLoc: Generalized      BP Readings from Last 4 Encounters:   09/10/24 121/83   07/29/24 118/82   06/24/24 111/70   06/18/24 (!) 144/88     Body mass index is 30.55 kg/m².         2/5/2024     8:39 AM   Rapid3 Question Responses and Scores   MDHAQ Score 2   Psychologic Score 7.7   Pain Score 6   When you awakened in the morning OVER THE LAST WEEK, did you feel stiff? Yes   If Yes, please indicate the number of hours until you are as limber as you will be for the day 8   Fatigue Score 10   Global Health Score 10   RAPID3 Score 7.56     Monitoring Lab Results:  Lab Results   Component Value Date    WBC 8.16 02/07/2024    RBC 5.37 02/07/2024    HGB 15.7 02/07/2024    HCT 46.9 02/07/2024    MCV 87 02/07/2024    MCH 29.2 02/07/2024    MCHC 33.5 02/07/2024    RDW 13.0 02/07/2024     02/07/2024      Lab Results   Component Value Date     02/07/2024    K 3.5 02/07/2024     02/07/2024    CO2 22 (L) 02/07/2024    GLU 94 02/07/2024    BUN 6 02/07/2024    CREATININE 0.8 02/07/2024    CALCIUM 9.3 02/07/2024    PROT 6.9 02/07/2024    ALBUMIN 4.1 02/07/2024    BILITOT 1.1 (H) 02/07/2024    ALKPHOS 47 (L) 02/07/2024    AST 17 02/07/2024    ALT 24 02/07/2024    ANIONGAP 9 02/07/2024    EGFRNORACEVR >60.0 02/07/2024     Lab Results   Component Value Date    SEDRATE <2 02/07/2024    CRP 1.6 02/07/2024      Lab Results   Component Value Date    YRUTOQHJ25PJ 42 06/10/2022    ZUXMOBWG67 >2000 (H) 06/03/2021      Lab Results  "  Component Value Date    CCPANTIBODIE <0.5 06/03/2021     Lab Results   Component Value Date    ANASCREEN Negative <1:80 06/10/2022    ANATITER 1:80 04/06/2021    DSDNA Negative 1:10 04/06/2021     No results found for: "HLABB27"    Infectious Disease Screening:  Lab Results   Component Value Date    HEPBSAG Non-reactive 02/07/2024    HEPBCAB Non-reactive 02/07/2024    HEPBSAB 88.40 02/07/2024    HEPBSAB Reactive 02/07/2024    HEPBSURFABQU POSITIVE 02/07/2024    HEPBSURFABQU 96 02/07/2024     Lab Results   Component Value Date    HEPCAB Non-reactive 02/07/2024     Lab Results   Component Value Date    TBGOLDPLUS Positive (A) 02/07/2024      Assessment:     Patient  is a 48 y.o. female with psoriatic arthritis was referred to the Rheumatology pharmacist for discussion of treatment options. Will initiate secukinumab (COSENTYX). Provided pt with education (MOA, frequency, route of administration, onset of action, injection instructions and safety profile) on secukinumab (COSENTYX). Counseled pt on how to inject new biologic therapy. Patient to inject 150 mg SC weekly x 5 weeks then every 4 weeks. Explained importance of vaccines considering the increased risk of infections. Patient has severe reactions to vaccines but aware to be careful. Encouraged pt to practice proper hand hygiene considering increased risk of infection with biologics and to avoid raw seafood/live vaccines. Reviewed needed labs to ensure medication is being used safely.     Plan:      Encounter Diagnoses   Name Primary?    PSA (psoriatic arthritis) Yes    Immunocompromised     Medication monitoring encounter     Immunization counseling      1. Discussion of Treatment Options:   Initiate Cosentyx 150 mg weekly x 5 weeks then every 4 weeks; Rxs sent to [facility].   Patient to make us aware if they ever experiences s/sx of an infection including fever, chills, URI symptoms or UTI symptoms.  Reviewed injection instructions  Patient verbalized understanding " instructions    2. Health Maintenance  Up to date with needed labs  Severe allergy to vaccines    Follow-up scheduled on 1/22/2025 with Dr. Grady Reid[facility] Pharm[facility]  Rheumatology Clinical Pharmacist  [facility]

## 2024-09-23 NOTE — PATIENT INSTRUCTIONS
It was a pleasure talking with you today. As a reminder, my name is Dr. Katlin Reid. I'm the clinical pharmacist in the Rheumatology office. If you have any questions or need any assistance, please reach out to the office.

## 2024-09-24 ENCOUNTER — PATIENT MESSAGE (OUTPATIENT)
Dept: OTHER | Facility: OTHER | Age: 49
End: 2024-09-24
Payer: COMMERCIAL

## 2024-09-25 ENCOUNTER — TELEPHONE (OUTPATIENT)
Dept: RHEUMATOLOGY | Facility: CLINIC | Age: 49
End: 2024-09-25
Payer: COMMERCIAL

## 2024-09-25 DIAGNOSIS — L40.50 PSA (PSORIATIC ARTHRITIS): Primary | ICD-10-CM

## 2024-09-25 RX ORDER — IXEKIZUMAB 80 MG/ML
80 INJECTION, SOLUTION SUBCUTANEOUS
Qty: 1 ML | Refills: 11 | Status: ACTIVE | OUTPATIENT
Start: 2024-09-25

## 2024-09-25 RX ORDER — IXEKIZUMAB 80 MG/ML
INJECTION, SOLUTION SUBCUTANEOUS
Qty: 2 ML | Refills: 0 | Status: ACTIVE | OUTPATIENT
Start: 2024-09-25

## 2024-09-25 NOTE — TELEPHONE ENCOUNTER
----- Message from Haylee Walls, PharmD sent at 9/25/2024  2:25 PM CDT -----  Regarding: Cosentyx Prescription  Good afternoon Katlin,     We received Ms. Pimentel's prescription for cosentyx. Cosentyx is not preferred on her insurance and will most probably get denied. However, Shahzad would be on the preferred list if you wanted to send over that prescription.     Please let me know how to proceed.     Thanks,   Haylee Walls, PharmD   Clinical Pharmacist  [facility]  [address] [place] [id number]  Phone: [phone]  Fax: [phone]

## 2024-09-26 DIAGNOSIS — H20.9 IRITIS: Primary | ICD-10-CM

## 2024-09-26 NOTE — PROGRESS NOTES
Physician attestation:   Credentials and Title of Author: Wu Rasmussen MD  Title: Rheumatologist    This is an established patient that has been following with me. I confirm the findings and recommendations made by the ambulatory clinical pharmacist. I also created the plan of care and discussed the treatment plan with the ambulatory clinical pharmacist. I agree with the proposed management plan as documented in her note.     Wu Rasmussen MD  Rheumatology Dept  [place], [place]

## 2024-09-27 ENCOUNTER — TELEPHONE (OUTPATIENT)
Dept: NEUROLOGY | Facility: CLINIC | Age: 49
End: 2024-09-27
Payer: COMMERCIAL

## 2024-09-27 NOTE — TELEPHONE ENCOUNTER
The prior authorization for Loren Lemus's Ubrelvy prescription has been APPROVED FROM 9/27/24 TO 3/26/25 with copayment of $0.00.

## 2024-09-30 RX ORDER — DEXAMETHASONE 1 MG/ML
2 SUSPENSION/ DROPS OPHTHALMIC EVERY 12 HOURS
Qty: 10 ML | Refills: 1 | Status: SHIPPED | OUTPATIENT
Start: 2024-09-30 | End: 2024-10-15

## 2024-09-30 RX ORDER — GENTAMICIN SULFATE 3 MG/ML
1 SOLUTION/ DROPS OPHTHALMIC 3 TIMES DAILY
Qty: 10 ML | Refills: 2 | Status: SHIPPED | OUTPATIENT
Start: 2024-09-30

## 2024-10-01 NOTE — PROGRESS NOTES
Patient ID: Loren Lemus is a 48 y.o. White female    Subjective  Chief Complaint: patient presents for medical weight loss management.    Contraindications to GLP-1 receptor agonist therapy:   Denies personal or family history of MTC, personal history of MEN2, history of allergic reaction while taking a GLP-1 receptor agonist, and history of pancreatitis while taking a GLP-1 receptor agonist     Co-morbidities: metabolic syndrome    History of weight loss therapy:  Pt took Rybelsus during Covid and lost 80 lbs. Pt was switching between Ozempic and Mounjaro but is off of therapy at this time. Pt reports tolerating all medications well.     Weight loss history:  Starting weight:    9/23/2024   Recent Readings    Weight (lbs) 210 lb    BMI 31.01 BMI      Objective  Lab Results   Component Value Date     02/07/2024     06/10/2022     03/26/2021     Lab Results   Component Value Date    K 3.5 02/07/2024    K 4.1 06/10/2022    K 3.6 03/26/2021     Lab Results   Component Value Date     02/07/2024     06/10/2022     03/26/2021     Lab Results   Component Value Date    CO2 22 (L) 02/07/2024    CO2 23 06/10/2022    CO2 23 03/26/2021     Lab Results   Component Value Date    BUN 6 02/07/2024    BUN 13 06/10/2022    BUN 9 03/26/2021     Lab Results   Component Value Date    GLU 94 02/07/2024    GLU 75 06/10/2022    GLU 52 (L) 03/26/2021     Lab Results   Component Value Date    CALCIUM 9.3 02/07/2024    CALCIUM 9.6 06/10/2022    CALCIUM 8.7 03/26/2021     Lab Results   Component Value Date    PROT 6.9 02/07/2024    PROT 6.7 06/10/2022    PROT 7.0 03/26/2021     Lab Results   Component Value Date    ALBUMIN 4.1 02/07/2024    ALBUMIN 4.2 06/10/2022    ALBUMIN 3.7 03/26/2021     Lab Results   Component Value Date    BILITOT 1.1 (H) 02/07/2024    BILITOT 1.3 (H) 06/10/2022    BILITOT 1.1 (H) 03/26/2021     Lab Results   Component Value Date    AST 17 02/07/2024    AST 16 06/10/2022    AST  29 03/26/2021     Lab Results   Component Value Date    ALT 24 02/07/2024    ALT 22 06/10/2022    ALT 97 (H) 03/26/2021     Lab Results   Component Value Date    ANIONGAP 9 02/07/2024    ANIONGAP 11 06/10/2022    ANIONGAP 10 03/26/2021     Lab Results   Component Value Date    CREATININE 0.8 02/07/2024    CREATININE 1.0 06/10/2022    CREATININE 0.7 03/26/2021     Lab Results   Component Value Date    EGFRNORACEVR >60.0 02/07/2024     Assessment/Plan  -Restarting GLP-1 therapy  - Initiate Zepbound 2.5 mg SQ weekly x 4 weeks  - Then increase to Zepbound 5 mg SQ weekly  - RTC in 3 months for follow-up evaluation    Patient consented to pharmacist management via collaborative practice.

## 2024-10-02 ENCOUNTER — PATIENT MESSAGE (OUTPATIENT)
Dept: INTERNAL MEDICINE | Facility: CLINIC | Age: 49
End: 2024-10-02

## 2024-10-02 ENCOUNTER — OFFICE VISIT (OUTPATIENT)
Dept: INTERNAL MEDICINE | Facility: CLINIC | Age: 49
End: 2024-10-02
Payer: COMMERCIAL

## 2024-10-02 DIAGNOSIS — E66.9 OBESITY, UNSPECIFIED CLASS, UNSPECIFIED OBESITY TYPE, UNSPECIFIED WHETHER SERIOUS COMORBIDITY PRESENT: Primary | ICD-10-CM

## 2024-10-02 PROCEDURE — 99499 UNLISTED E&M SERVICE: CPT | Mod: 95,,,

## 2024-10-02 RX ORDER — TIRZEPATIDE 2.5 MG/.5ML
2.5 INJECTION, SOLUTION SUBCUTANEOUS
Qty: 2 ML | Refills: 0 | Status: ACTIVE | OUTPATIENT
Start: 2024-10-02

## 2024-10-02 RX ORDER — TIRZEPATIDE 5 MG/.5ML
5 INJECTION, SOLUTION SUBCUTANEOUS
Qty: 2 ML | Refills: 1 | Status: ACTIVE | OUTPATIENT
Start: 2024-10-30

## 2024-10-02 NOTE — PATIENT INSTRUCTIONS
Zepbound Patient Education  Savings Card  Follow this link to sign up for your Zepbound savings card. You will need this information when the [facility] contacts you to help pay for your prescription.  Zepbound Savings Card    Dosing Schedule      Choosing an Injection Site and Using your Pen       - Pens are stored in the refrigerator and can be removed 20-30 minutes before the injection to allow the medication to come to room temperature to avoid irritation, burning, or bruising with injection.     What to Expect      Rare, but Serious Side Effects  - Zepbound may cause pancreatitis or gallstones. If you experience severe abdominal pain that may radiate to the back and may or may not be accompanied by vomiting, you are encouraged to seek medical attention to assess your symptoms.     Reproduction, Pregnancy, and Lactation Considerations  - Zepbound is not recommended for use in patients who are currently pregnant or breastfeeding.   - If you plan to become pregnant, the use of this medication is not recommended at the time of conception. It is recommended that this medication should be discontinued at least 2 months prior to conception.     Patient's using Oral Contraceptives  - Use of Zepbound may decrease the effectiveness of your oral hormonal contraceptives. You may consider switching to an alternative therapy.  - Otherwise, while using Zepbound alongside your oral hormonal contraceptive, you should add a barrier method of contraception for 4 weeks after initiation and for 4 weeks after each dose titration of Zepbound.     Missed or Changing Your Dosing Schedule  - If you miss a dose of Zepbound, take it as soon as possible, within 4 days of your scheduled dose. If more than 4 days have passed, skip the missed dose and take your next dose on your regularly scheduled day.  - If you would like to change the day of the week you take your Zepbound dose, make sure there are at least 3 days  between doses.

## 2024-10-03 ENCOUNTER — TELEPHONE (OUTPATIENT)
Dept: NEUROLOGY | Facility: CLINIC | Age: 49
End: 2024-10-03
Payer: COMMERCIAL

## 2024-10-03 NOTE — TELEPHONE ENCOUNTER
The prior authorization for Loren Lemus's Aimovig prescription has been APPROVED FROM 10/3/24 TO 4/1/25 with copayment of $25.00.

## 2024-10-31 ENCOUNTER — PATIENT MESSAGE (OUTPATIENT)
Dept: RHEUMATOLOGY | Facility: CLINIC | Age: 49
End: 2024-10-31
Payer: COMMERCIAL

## 2024-11-19 ENCOUNTER — PATIENT MESSAGE (OUTPATIENT)
Dept: RHEUMATOLOGY | Facility: CLINIC | Age: 49
End: 2024-11-19
Payer: COMMERCIAL

## 2024-11-19 ENCOUNTER — OFFICE VISIT (OUTPATIENT)
Dept: OTOLARYNGOLOGY | Facility: CLINIC | Age: 49
End: 2024-11-19
Payer: COMMERCIAL

## 2024-11-19 DIAGNOSIS — J06.9 UPPER RESPIRATORY TRACT INFECTION, UNSPECIFIED TYPE: Primary | ICD-10-CM

## 2024-11-19 DIAGNOSIS — J01.00 ACUTE MAXILLARY SINUSITIS, RECURRENCE NOT SPECIFIED: ICD-10-CM

## 2024-11-19 DIAGNOSIS — L40.50 PSA (PSORIATIC ARTHRITIS): Primary | ICD-10-CM

## 2024-11-19 DIAGNOSIS — J04.0 LARYNGITIS: ICD-10-CM

## 2024-11-19 PROCEDURE — 99999 PR PBB SHADOW E&M-EST. PATIENT-LVL I: CPT | Mod: PBBFAC,,, | Performed by: STUDENT IN AN ORGANIZED HEALTH CARE EDUCATION/TRAINING PROGRAM

## 2024-11-19 PROCEDURE — 99203 OFFICE O/P NEW LOW 30 MIN: CPT | Mod: S$GLB,,, | Performed by: STUDENT IN AN ORGANIZED HEALTH CARE EDUCATION/TRAINING PROGRAM

## 2024-11-19 RX ORDER — AMOXICILLIN AND CLAVULANATE POTASSIUM 875; 125 MG/1; MG/1
1 TABLET, FILM COATED ORAL EVERY 12 HOURS
Qty: 20 TABLET | Refills: 0 | Status: SHIPPED | OUTPATIENT
Start: 2024-11-19 | End: 2024-11-29

## 2024-11-19 RX ORDER — PREDNISONE 20 MG/1
20 TABLET ORAL DAILY
Qty: 5 TABLET | Refills: 0 | Status: SHIPPED | OUTPATIENT
Start: 2024-11-19 | End: 2024-11-24

## 2024-11-19 NOTE — PROGRESS NOTES
Otolaryngology Clinic Note    Subjective:       Patient ID: Loren Lemus is a 48 y.o. female.    Chief Complaint: No chief complaint on file.      History of Present Illness: Loren Lemus is a 48 y.o. female with Pmh PsA, migraines presenting with illness past week. Left face hurt badly last night.   Has had laryngitis before but this is worse. Jaw was swollen and was concerned. Feels lymph node on left. This happened a couple of months ago and dentist assessed with no teeth issues. She gets masseter botox and was not sure if this was tighter but then eyes got puffy. Nose was hurting on left. Has had DNS surgery twice. No sinus surgery. She feels PND. Can smell infection she thinks. Has pressure in cheek. Taking advil all day.   Recent family exposure to walking PNA and mono.   Taking zyrtec 1-2 times a day. No regular sinus issues.   Migraines are different from this.   Has grandson who has been ill as well.   She took amoxil for a couple of days and felt better then got worse.     Past Surgical History:   Procedure Laterality Date    BREAST SURGERY Bilateral     augmentation with implants    KNEE SURGERY Left     PELVIC LAPAROSCOPY      SEPTORHINOPLASTY  1988    SINUS SURGERY      TONSILLECTOMY      TYMPANOSTOMY TUBE PLACEMENT       Past Medical History:   Diagnosis Date    Allergy     Asthma     Endometriosis     Kidney stone     x 1    Migraine     Miscarriage     MVP (mitral valve prolapse)      Social Drivers of Health     Tobacco Use: Low Risk  (10/2/2024)    Patient History     Smoking Tobacco Use: Never     Smokeless Tobacco Use: Never     Passive Exposure: Not on file   Alcohol Use: Not At Risk (2/20/2024)    AUDIT-C     Frequency of Alcohol Consumption: Monthly or less     Average Number of Drinks: 1 or 2     Frequency of Binge Drinking: Never   Financial Resource Strain: Low Risk  (2/20/2024)    Overall Financial Resource Strain (CARDIA)     Difficulty of Paying Living Expenses: Not very hard  "  Food Insecurity: No Food Insecurity (2/20/2024)    Hunger Vital Sign     Worried About Running Out of Food in the Last Year: Never true     Ran Out of Food in the Last Year: Never true   Transportation Needs: No Transportation Needs (2/20/2024)    PRAPARE - Transportation     Lack of Transportation (Medical): No     Lack of Transportation (Non-Medical): No   Physical Activity: Insufficiently Active (2/20/2024)    Exercise Vital Sign     Days of Exercise per Week: 1 day     Minutes of Exercise per Session: 30 min   Stress: Stress Concern Present (2/20/2024)    [employer] - Occupational Stress Questionnaire     Feeling of Stress : Very much   Housing Stability: Low Risk  (2/20/2024)    Housing Stability Vital Sign     Unable to Pay for Housing in the Last Year: No     Number of Places Lived in the Last Year: 1     Unstable Housing in the Last Year: No   Depression: Not on file   Utilities: Not on file   Health Literacy: Not on file   Social Isolation: Not on file     Review of patient's allergies indicates:   Allergen Reactions    Codeine Hives    Covid-19 vacc,mrna(pfizer)(pf) Anaphylaxis     To influenza    Flu vaccine [id number](6mos up) Anaphylaxis    Latex, natural rubber Swelling    Oxycodone Hives     Current Outpatient Medications   Medication Instructions    AIMOVIG AUTOINJECTOR 140 mg, Subcutaneous, Every 28 days    blunt needle, disposable 18 x 1 1/2 " Ndle Use to inject once a week    cetirizine (ZYRTEC) 10 mg, Oral, 2 times daily    DULoxetine (CYMBALTA) 30 mg, Oral, 2 times daily    gentamicin (GARAMYCIN) 0.3 % ophthalmic solution 1 drop, Both Eyes, 3 times daily    naproxen sodium (ANAPROX) 550 mg, Oral, 2 times daily    ondansetron (ZOFRAN) 8 mg, Oral, As needed (PRN)    ondansetron (ZOFRAN) 4 mg, Oral, Every 6 hours PRN    pramipexole (MIRAPEX) 0.25 mg, Oral, Nightly    safety needles 25 gauge x 1 1/2" Ndle Use to inject every 7 days    syringe, disposable, 1 mL Syrg Use " 1 Syringe once a week.    thyroid, pork, (NP THYROID) 15 mg Tab Take 1 tablet (15 mg total) by mouth daily before breakfast with water    topiramate (TOPAMAX) 50 mg, Oral, 2 times daily    ubrogepant (UBRELVY) 100 mg tablet Start 1 tablet (ubrelvy 100 mg) for headache attacks. May repeat once in 1 hours to a max of 2 per day    ZEPBOUND 5 mg, Subcutaneous, Every 7 days    ZOLMitriptan (ZOMIG) 5 mg Spry Use 1 spray in one nostril as needed for migraine. May repeat once in 2 hours. No more than 2 days/week.         ENT ROS negative except as stated above.     Patient answers are not available for this visit.            Objective:      There were no vitals filed for this visit.    General: NAD, well appearing  Eyes: Normal conjunctiva and lids  Face: symmetric, nerve intact  Nose: The nose is without any evidence of any deformity. The nasal mucosa is moist. The septum is mildly deviated left. The middle turbinate contacts septum. Some mild cloudy drainage. There is no evidence of septal hematoma. The turbinates are without abnormality.   Ears: The ears are with normal-appearing pinna. Examination of the canals is normal appearing bilaterally. There is no drainage or erythema noted. The tympanic membranes are normal appearing with pearly color, normal-appearing landmarks and normal light reflex. Hearing is grossly intact.  Mouth: No obvious abnormalities to the lips. The teeth are unremarkable. The gingivae are without any obvious evidence of infection or lesion. The oral mucosa is moist and pink. There are no obvious masses to the hard or soft palate.   Oropharynx: The uvula is midline.  The tongue is midline. The posterior pharynx is mildly inflamed. The tonsils are normal appearing 1+.  Salivary glands: The salivary glands are symmetric and not enlarged, no masses  Neck: No lymphadenopathy, trachea midline, thryoid not enlarged.  Psych: Normal mood and affect.   Neuro: Grossly intact  Speech: fluent          Assessment and Plan:       1. Upper respiratory tract infection, unspecified type    2. Acute maxillary sinusitis, recurrence not specified    3. Laryngitis          Will do Augmentin course and prednisone 5 days now.   Not regularly sick. If not improving, will plan to scope.     RTC: PRN    Plan of care was discussed in detail with the patient, who agreed with the plan as above. All questions were answered in detail.     Debbi Patiño MD  Otolaryngology

## 2024-11-21 ENCOUNTER — OFFICE VISIT (OUTPATIENT)
Dept: OBSTETRICS AND GYNECOLOGY | Facility: CLINIC | Age: 49
End: 2024-11-21
Payer: COMMERCIAL

## 2024-11-21 VITALS
DIASTOLIC BLOOD PRESSURE: 80 MMHG | BODY MASS INDEX: 29.41 KG/M2 | SYSTOLIC BLOOD PRESSURE: 112 MMHG | WEIGHT: 199.31 LBS

## 2024-11-21 DIAGNOSIS — Z01.419 WELL WOMAN EXAM WITH ROUTINE GYNECOLOGICAL EXAM: Primary | ICD-10-CM

## 2024-11-21 PROCEDURE — 99386 PREV VISIT NEW AGE 40-64: CPT | Mod: S$GLB,,, | Performed by: OBSTETRICS & GYNECOLOGY

## 2024-11-21 PROCEDURE — 3074F SYST BP LT 130 MM HG: CPT | Mod: CPTII,S$GLB,, | Performed by: OBSTETRICS & GYNECOLOGY

## 2024-11-21 PROCEDURE — 99999 PR PBB SHADOW E&M-EST. PATIENT-LVL IV: CPT | Mod: PBBFAC,,, | Performed by: OBSTETRICS & GYNECOLOGY

## 2024-11-21 PROCEDURE — 3079F DIAST BP 80-89 MM HG: CPT | Mod: CPTII,S$GLB,, | Performed by: OBSTETRICS & GYNECOLOGY

## 2024-11-21 PROCEDURE — 1159F MED LIST DOCD IN RCRD: CPT | Mod: CPTII,S$GLB,, | Performed by: OBSTETRICS & GYNECOLOGY

## 2024-11-21 PROCEDURE — 3008F BODY MASS INDEX DOCD: CPT | Mod: CPTII,S$GLB,, | Performed by: OBSTETRICS & GYNECOLOGY

## 2024-11-21 RX ORDER — APREMILAST 30 MG/1
30 TABLET, FILM COATED ORAL 2 TIMES DAILY
Qty: 60 TABLET | Refills: 11 | Status: SHIPPED | OUTPATIENT
Start: 2024-11-21

## 2024-11-21 RX ORDER — FLUCONAZOLE 100 MG/1
100 TABLET ORAL DAILY
Qty: 3 TABLET | Refills: 1 | Status: SHIPPED | OUTPATIENT
Start: 2024-11-21

## 2024-11-21 NOTE — TELEPHONE ENCOUNTER
Tiana Hadley, PharmD  Wu Rasmussen MD; Katlin Reid, PharmD  Cc: Haylee Walls, PharmD  Good afternoon,    Ms. Lemus asked us to reach out on her behalf regarding Taltz. She stated she sent a message to the office but has not received a reply yet. She informed me she had a reaction to her first injection which was similar to the anaphylactic reaction she had to a flu shot. She does not want to continue the Taltz or start a new biologic. She is requesting an order to restart her plaquenil bc she feels this worked best, and she is willing to tolerate the dry eye side effect she experienced.    Her next appointment is scheduled for 1/22/25. Please let me know if there is a message I can relay to her.    Thank you,    Tiana Hadley, PharmD  Clinical Pharmacist  [facility]- [address] [place] [address]   Phone: [phone]

## 2024-11-21 NOTE — TELEPHONE ENCOUNTER
Discussed with Jen Worley PA-C. Patient can try Otezla. Jen sent patient message about if she is ok with trying Otezla    Patient responded that she is. Rx sent to [facility]

## 2024-11-21 NOTE — PROGRESS NOTES
HPI:  48 y.o.   OB History          4    Para   3    Term   3            AB   1    Living   3         SAB        IAB        Ectopic        Multiple        Live Births                  Patient's last menstrual period was 2016.   Patient here for her annual gynecologic exam.  She has no complaints at this time.    ROS:  GENERAL: No fever, chills, fatigability or weight loss.  SKIN: No rashes, itching or changes in color or texture of skin.  HEAD: No headaches or recent head trauma.  EYES: Visual acuity fine. No photophobia, ocular pain or diplopia.  EARS: Denies ear pain, discharge or vertigo.  NOSE: No loss of smell, no epistaxis or postnasal drip.  MOUTH & THROAT: No hoarseness or change in voice. No excessive gum bleeding.  NODES: Denies swollen glands.  CHEST: Denies DONOHUE, cyanosis, wheezing, cough and sputum production.  CARDIOVASCULAR: Denies chest pain, PND, orthopnea or reduced exercise tolerance.  ABDOMEN: Appetite fine. No weight loss. Denies diarrhea, abdominal pain, hematemesis or blood in stool.  URINARY: No flank pain, dysuria or hematuria.  PERIPHERAL VASCULAR: No claudication or cyanosis.  MUSCULOSKELETAL: No joint stiffness or swelling. Denies back pain.  NEUROLOGIC: No history of seizures, paralysis, alteration of gait or coordination.    PE:   /80   Wt 90.4 kg (199 lb 4.7 oz)   LMP 2016   BMI 29.41 kg/m²   APPEARANCE: Well nourished, well developed, in no acute distress.  NECK: Neck symmetric without masses or thyromegaly.  NODES: No inguinal lymph node enlargement.  ABDOMEN: Soft. No tenderness or masses. No hepatosplenomegaly. No hernias.  BREASTS: Symmetrical, no skin changes or visible lesions. No palpable masses, nipple discharge or adenopathy bilaterally.  PELVIC: Normal external female genitalia without lesions. Normal hair distribution. Adequate perineal body, normal urethral meatus. Vagina moist and well rugated without lesions or discharge. No  significant cystocele or rectocele. Uterus and cervix surgically absent. Bimanual exam revealed no masses, tenderness or abnormality.    Procedure:  Pap Smear    Assessment:  Normal Gynecologic Exam    Plan:  Mammogram and Colonoscopy as per current recommendations.   Return to clinic in one year or for any problems or complaints.  Ov in  Fh ovary cancer mom  No hrt, migraines

## 2024-12-04 ENCOUNTER — HOSPITAL ENCOUNTER (OUTPATIENT)
Dept: RADIOLOGY | Facility: HOSPITAL | Age: 49
Discharge: HOME | End: 2024-12-04
Attending: OBSTETRICS & GYNECOLOGY
Payer: COMMERCIAL

## 2024-12-04 ENCOUNTER — PATIENT MESSAGE (OUTPATIENT)
Dept: OBSTETRICS AND GYNECOLOGY | Facility: CLINIC | Age: 49
End: 2024-12-04
Payer: COMMERCIAL

## 2024-12-04 DIAGNOSIS — T85.9XXA: Primary | ICD-10-CM

## 2024-12-04 DIAGNOSIS — Z01.419 WELL WOMAN EXAM WITH ROUTINE GYNECOLOGICAL EXAM: ICD-10-CM

## 2024-12-06 ENCOUNTER — PROCEDURE VISIT (OUTPATIENT)
Dept: NEUROLOGY | Facility: CLINIC | Age: 49
End: 2024-12-06
Payer: COMMERCIAL

## 2024-12-06 VITALS
WEIGHT: 197.75 LBS | HEIGHT: 69 IN | SYSTOLIC BLOOD PRESSURE: 126 MMHG | BODY MASS INDEX: 29.29 KG/M2 | DIASTOLIC BLOOD PRESSURE: 79 MMHG | HEART RATE: 93 BPM

## 2024-12-06 DIAGNOSIS — R42 SEVERE DIZZINESS: ICD-10-CM

## 2024-12-06 DIAGNOSIS — G43.711 INTRACTABLE CHRONIC MIGRAINE WITHOUT AURA AND WITH STATUS MIGRAINOSUS: Primary | ICD-10-CM

## 2024-12-06 NOTE — PROCEDURES
ProceduresBOTOX PROCEDURE    PROCEDURE PERFORMED: Botulinum toxin injection (03067)    CLINICAL INDICATION: G43.719    A time out was conducted just before the start of the procedure to verify the correct patient and procedure, procedure location, and all relevant critical information.   The patient meets criteria for chronic headaches according to the ICHD-II, the patient has more than 15 headaches a month out of at least 8 are migraines which last for more than 4 hours a day.    The Botox injections have achieved well over 50%  improvement in the patient's symptoms. Migraines have been reduced at least 7 days per month and the number of cumulative hours suffering with headaches has been reduced at least 100 total hours per month. Today she does have a headache indicating that the Botox has worn off. Frequency of treatment is every 3 months unless no response to the treatments, at which time we will discontinue the injections.      DESCRIPTION OF PROCEDURE: After obtaining informed consent and under aseptic technique, a total of 185 units of botulinum toxin type A were injected in the following muscles: Procerus 5 units,  5 units bilaterally, frontalis 20 units, temporalis 20 units bilaterally, Masseters 15 units bilaterally (3 sites, 5 units per site), occipitalis 15 units, upper cervical paraspinals 10 units bilaterally and trapezius 15 units bilaterally. The patient was given a total of 185 units in 37 sites.The patient tolerated the procedure well. There were no complications. The patient was given a prescription for repeat treatment in 3 months.     Unavoidable waste 15 units      The patient reports dizziness and imbalance, she has multiple co-morbidities. Will obtain MRI of the brain to rule out structural lesions.    Mitchell Hill M.D  Medical Director, Headache and Facial Pain  [facility]

## 2024-12-12 NOTE — PROGRESS NOTES
Patient ID: Loren Lemus is a 48 y.o. White female    Subjective  Chief Complaint: patient presents for medical weight loss management.    Co-morbidities: metabolic syndrome    HPI: Patient [facility] with [facility] in October 2024 and is currently managed on Zepbound 5 mg. Pt took Rybelsus during Covid and lost 80 lbs. Pt was switching between Ozempic and Mounjaro prior to being see at the [facility].     Tolerance to current therapy:  Denies nausea, vomiting, diarrhea, constipation, abdominal pain    Weight loss history:  Starting weight:    9/23/2024   Recent Readings    Weight (lbs) 210 lb    BMI 31.01 BMI    Current weight:    11/21/2024   Recent Readings    Weight (lbs) 198 lb    BMI 29.24 BMI    % weight loss since GLP-1 initiation: 5.7 %    Objective  Lab Results   Component Value Date     02/07/2024     06/10/2022     03/26/2021     Lab Results   Component Value Date    K 3.5 02/07/2024    K 4.1 06/10/2022    K 3.6 03/26/2021     Lab Results   Component Value Date     02/07/2024     06/10/2022     03/26/2021     Lab Results   Component Value Date    CO2 22 (L) 02/07/2024    CO2 23 06/10/2022    CO2 23 03/26/2021     Lab Results   Component Value Date    BUN 6 02/07/2024    BUN 13 06/10/2022    BUN 9 03/26/2021     Lab Results   Component Value Date    GLU 94 02/07/2024    GLU 75 06/10/2022    GLU 52 (L) 03/26/2021     Lab Results   Component Value Date    CALCIUM 9.3 02/07/2024    CALCIUM 9.6 06/10/2022    CALCIUM 8.7 03/26/2021     Lab Results   Component Value Date    PROT 6.9 02/07/2024    PROT 6.7 06/10/2022    PROT 7.0 03/26/2021     Lab Results   Component Value Date    ALBUMIN 4.1 02/07/2024    ALBUMIN 4.2 06/10/2022    ALBUMIN 3.7 03/26/2021     Lab Results   Component Value Date    BILITOT 1.1 (H) 02/07/2024    BILITOT 1.3 (H) 06/10/2022    BILITOT 1.1 (H) 03/26/2021     Lab Results   Component Value Date    AST 17  02/07/2024    AST 16 06/10/2022    AST 29 03/26/2021     Lab Results   Component Value Date    ALT 24 02/07/2024    ALT 22 06/10/2022    ALT 97 (H) 03/26/2021     Lab Results   Component Value Date    ANIONGAP 9 02/07/2024    ANIONGAP 11 06/10/2022    ANIONGAP 10 03/26/2021     Lab Results   Component Value Date    CREATININE 0.8 02/07/2024    CREATININE 1.0 06/10/2022    CREATININE 0.7 03/26/2021     Lab Results   Component Value Date    EGFRNORACEVR >60.0 02/07/2024     Assessment/Plan  - Continue Zepbound 5 mg SQ weekly  - RTC in 6 months for follow-up evaluation    Patient consented to pharmacist management via collaborative practice.

## 2024-12-13 ENCOUNTER — OFFICE VISIT (OUTPATIENT)
Dept: INTERNAL MEDICINE | Facility: CLINIC | Age: 49
End: 2024-12-13
Payer: COMMERCIAL

## 2024-12-13 ENCOUNTER — PATIENT MESSAGE (OUTPATIENT)
Dept: INTERNAL MEDICINE | Facility: CLINIC | Age: 49
End: 2024-12-13

## 2024-12-13 DIAGNOSIS — E66.3 OVERWEIGHT WITH BODY MASS INDEX (BMI) OF 29 TO 29.9 IN ADULT: Primary | ICD-10-CM

## 2024-12-13 RX ORDER — TIRZEPATIDE 5 MG/.5ML
5 INJECTION, SOLUTION SUBCUTANEOUS
Qty: 2 ML | Refills: 5 | Status: ACTIVE | OUTPATIENT
Start: 2024-12-13

## 2024-12-30 ENCOUNTER — HOSPITAL ENCOUNTER (OUTPATIENT)
Dept: RADIOLOGY | Facility: HOSPITAL | Age: 49
Discharge: HOME | End: 2024-12-30
Attending: PSYCHIATRY & NEUROLOGY
Payer: COMMERCIAL

## 2024-12-30 DIAGNOSIS — R42 SEVERE DIZZINESS: ICD-10-CM

## 2024-12-30 PROCEDURE — 70553 MRI BRAIN STEM W/O & W/DYE: CPT | Mod: 26,,, | Performed by: RADIOLOGY

## 2024-12-30 PROCEDURE — 70553 MRI BRAIN STEM W/O & W/DYE: CPT | Mod: TC,PO

## 2024-12-30 PROCEDURE — A9585 GADOBUTROL INJECTION: HCPCS | Mod: PO | Performed by: PSYCHIATRY & NEUROLOGY

## 2024-12-30 PROCEDURE — 25500020 PHARM REV CODE 255: Mod: PO | Performed by: PSYCHIATRY & NEUROLOGY

## 2024-12-30 RX ORDER — GADOBUTROL 604.72 MG/ML
9 INJECTION INTRAVENOUS
Status: COMPLETED | OUTPATIENT
Start: 2024-12-30 | End: 2024-12-30

## 2024-12-30 RX ADMIN — GADOBUTROL 9 ML: 604.72 INJECTION INTRAVENOUS at 09:12

## 2025-01-02 ENCOUNTER — HOSPITAL ENCOUNTER (OUTPATIENT)
Dept: RADIOLOGY | Facility: HOSPITAL | Age: 50
Discharge: HOME | End: 2025-01-02
Attending: OBSTETRICS & GYNECOLOGY
Payer: COMMERCIAL

## 2025-01-02 DIAGNOSIS — T85.9XXA: ICD-10-CM

## 2025-01-02 PROCEDURE — A9577 INJ MULTIHANCE: HCPCS | Mod: PO | Performed by: OBSTETRICS & GYNECOLOGY

## 2025-01-02 PROCEDURE — 25500020 PHARM REV CODE 255: Mod: PO | Performed by: OBSTETRICS & GYNECOLOGY

## 2025-01-02 PROCEDURE — 77049 MRI BREAST C-+ W/CAD BI: CPT | Mod: 26,,, | Performed by: RADIOLOGY

## 2025-01-02 PROCEDURE — 77049 MRI BREAST C-+ W/CAD BI: CPT | Mod: TC,PO

## 2025-01-02 RX ADMIN — GADOBENATE DIMEGLUMINE 16 ML: 529 INJECTION, SOLUTION INTRAVENOUS at 12:01

## 2025-01-06 ENCOUNTER — PATIENT MESSAGE (OUTPATIENT)
Dept: NEUROLOGY | Facility: CLINIC | Age: 50
End: 2025-01-06
Payer: COMMERCIAL

## 2025-01-06 DIAGNOSIS — G43.711 CHRONIC MIGRAINE WITHOUT AURA, WITH INTRACTABLE MIGRAINE, SO STATED, WITH STATUS MIGRAINOSUS: Primary | ICD-10-CM

## 2025-01-22 ENCOUNTER — OFFICE VISIT (OUTPATIENT)
Dept: RHEUMATOLOGY | Facility: CLINIC | Age: 50
End: 2025-01-22
Payer: COMMERCIAL

## 2025-01-22 DIAGNOSIS — L40.50 PSA (PSORIATIC ARTHRITIS): ICD-10-CM

## 2025-01-22 DIAGNOSIS — Z15.89 MTHFR MUTATION: Primary | ICD-10-CM

## 2025-01-22 DIAGNOSIS — I47.10 SVT (SUPRAVENTRICULAR TACHYCARDIA): ICD-10-CM

## 2025-01-22 DIAGNOSIS — E78.9 LIPID DISORDER: ICD-10-CM

## 2025-01-22 DIAGNOSIS — G43.911 INTRACTABLE MIGRAINE WITH STATUS MIGRAINOSUS, UNSPECIFIED MIGRAINE TYPE: ICD-10-CM

## 2025-01-22 DIAGNOSIS — E66.9 OBESITY, UNSPECIFIED CLASS, UNSPECIFIED OBESITY TYPE, UNSPECIFIED WHETHER SERIOUS COMORBIDITY PRESENT: ICD-10-CM

## 2025-01-22 DIAGNOSIS — N39.0 URINARY TRACT INFECTION WITHOUT HEMATURIA, SITE UNSPECIFIED: ICD-10-CM

## 2025-01-22 DIAGNOSIS — G50.0 TRIGEMINAL NEURALGIA: ICD-10-CM

## 2025-01-22 PROCEDURE — 1159F MED LIST DOCD IN RCRD: CPT | Mod: CPTII,95,, | Performed by: INTERNAL MEDICINE

## 2025-01-22 PROCEDURE — 1160F RVW MEDS BY RX/DR IN RCRD: CPT | Mod: CPTII,95,, | Performed by: INTERNAL MEDICINE

## 2025-01-22 PROCEDURE — 98007 SYNCH AUDIO-VIDEO EST HI 40: CPT | Mod: 95,,, | Performed by: INTERNAL MEDICINE

## 2025-01-22 RX ORDER — TIRZEPATIDE 5 MG/.5ML
5 INJECTION, SOLUTION SUBCUTANEOUS
Qty: 2 ML | Refills: 5 | Status: SHIPPED | OUTPATIENT
Start: 2025-01-22 | End: 2025-01-22 | Stop reason: SDUPTHER

## 2025-01-22 RX ORDER — IXEKIZUMAB 80 MG/ML
80 INJECTION, SOLUTION SUBCUTANEOUS
Qty: 1 ML | Refills: 11 | Status: ACTIVE | OUTPATIENT
Start: 2025-01-22

## 2025-01-22 RX ORDER — LEUCOVORIN CALCIUM 5 MG/1
5 TABLET ORAL DAILY
Qty: 30 TABLET | Refills: 11 | Status: SHIPPED | OUTPATIENT
Start: 2025-01-22 | End: 2026-01-22

## 2025-01-22 RX ORDER — NITROFURANTOIN 25; 75 MG/1; MG/1
100 CAPSULE ORAL 2 TIMES DAILY
Qty: 20 CAPSULE | Refills: 0 | Status: SHIPPED | OUTPATIENT
Start: 2025-01-22 | End: 2025-02-02

## 2025-01-22 RX ORDER — PROPRANOLOL HYDROCHLORIDE 10 MG/1
10 TABLET ORAL 2 TIMES DAILY
Qty: 60 TABLET | Refills: 11 | Status: SHIPPED | OUTPATIENT
Start: 2025-01-22 | End: 2026-01-22

## 2025-01-22 RX ORDER — CARBAMAZEPINE 100 MG/1
100 TABLET, EXTENDED RELEASE ORAL 2 TIMES DAILY
Qty: 60 TABLET | Refills: 11 | Status: SHIPPED | OUTPATIENT
Start: 2025-01-22 | End: 2026-01-22

## 2025-01-22 RX ORDER — TIRZEPATIDE 5 MG/.5ML
5 INJECTION, SOLUTION SUBCUTANEOUS
Qty: 2 ML | Refills: 2 | Status: ACTIVE | OUTPATIENT
Start: 2025-01-22

## 2025-01-22 NOTE — PROGRESS NOTES
Subjective:     Patient ID:  Loren Lemus    Chief Complaint:  Disease Management     History of Present Illness:  Pt is a 49 y.o. female  CVID, PSA, non celiac gluten hypersenitivity, hashimoto's thyroiditis, MCDT on MTX doing poorly severe fatigue dc plaquenil. She tried taltz but stopped and startH/o migraine Patient complains of arthralgias and myalgias for which has been present for a few years. She has lost 5% of her body weight but is unable to  lose any more. She has tried diet exercise and other medications including Alii, adepix and contrave without results.Pain is located in multiple joints, both shoulder(s), both elbow(s), both wrist(s), both MCP(s): 1st, 2nd, 3rd, 4th and 5th, both PIP(s): 1st, 2nd, 3rd, 4th and 5th, both DIP(s): 1st and 2nd, both hip(s), both knee(s) and both MTP(s): 1st, 2nd, 3rd, 4th and 5th, is described as aching, pulsating, shooting and throbbing, and is constant, moderate .  Associated symptoms include: crepitation, decreased range of motion, edema, effusion, tenderness and warmth.       Rheumatologic History:   - Diagnosis/es:  - Positive serologies:  - Infectious screening labs:  - Previous Treatments:  - Current Treatments:     Interval History:   Hospitalization since last office visit: No    Patient Active Problem List    Diagnosis Date Noted    Obesity, unspecified 10/02/2024    Neck pain 07/01/2024    Decreased ROM of intervertebral discs of cervical spine 07/01/2024    PSA (psoriatic arthritis) 06/24/2024    Hypovitaminosis D 03/27/2021    Perimenopause 03/27/2021    Dysglycemia 03/26/2021    Weight gain 03/26/2021    Dysmetabolic syndrome 03/26/2021    History of endometriosis 03/26/2021    History of nephrolithiasis 03/26/2021    History of hysterectomy 03/26/2021    Tachycardia, unspecified 03/26/2021    Contusion of left wrist 06/12/2020    Intractable chronic migraine without aura and with status migrainosus 09/25/2019    Migraine with aura and without status  "migrainosus, not intractable 09/25/2019    Palpitations 07/16/2018    Dyspnea 03/09/2018    Cervical high risk HPV (human papillomavirus) test positive 01/17/2017    Musculoskeletal chest pain 01/18/2010    Anxiety, generalized 01/18/2010    Headache, migraine 12/07/2009    Mastitis 12/07/2009    Airway hyperreactivity 12/07/2009    Abscess 12/07/2009     Past Surgical History:   Procedure Laterality Date    BREAST SURGERY Bilateral     augmentation with implants    HYSTERECTOMY      KNEE SURGERY Left     PELVIC LAPAROSCOPY      SEPTORHINOPLASTY  1988    SINUS SURGERY      TONSILLECTOMY      TYMPANOSTOMY TUBE PLACEMENT       Social History     Tobacco Use    Smoking status: Never    Smokeless tobacco: Never   Substance Use Topics    Alcohol use: Not Currently    Drug use: No     Family History   Problem Relation Name Age of Onset    Prostate cancer Paternal Grandfather      Immunodeficiency Maternal Grandmother      Nephrolithiasis Father          severe    Hypertension Father      Clotting disorder Mother      Hypertension Mother      Immunodeficiency Daughter Ana     Immunodeficiency Daughter Gwen     Allergic rhinitis Neg Hx      Allergies Neg Hx      Angioedema Neg Hx      Asthma Neg Hx      Atopy Neg Hx      Eczema Neg Hx      Rhinitis Neg Hx      Urticaria Neg Hx       Review of patient's allergies indicates:   Allergen Reactions    Codeine Hives    Covid-19 vacc,mrna(pfizer)(pf) Anaphylaxis     To influenza    Flu vaccine [id number](6mos up) Anaphylaxis    Latex, natural rubber Swelling    Oxycodone Hives       Review of Systems   Review of Systems     Current Medications:  Current Outpatient Medications   Medication Instructions    AIMOVIG AUTOINJECTOR 140 mg, Subcutaneous, Every 28 days    blunt needle, disposable 18 x 1 1/2 " Ndle Use to inject once a week    carBAMazepine (TEGRETOL XR) 100 mg, Oral, 2 times daily    cetirizine (ZYRTEC) 10 mg, 2 times daily    DULoxetine (CYMBALTA) 30 mg, Oral, 2 " "times daily    fluconazole (DIFLUCAN) 100 mg, Oral, Daily    gentamicin (GARAMYCIN) 0.3 % ophthalmic solution 1 drop, Both Eyes, 3 times daily    leucovorin (WELLCOVORIN) 5 mg, Oral, Daily    naproxen sodium (ANAPROX) 550 mg, Oral, 2 times daily    ondansetron (ZOFRAN) 8 mg, As needed (PRN)    ondansetron (ZOFRAN) 4 mg, Oral, Every 6 hours PRN    OTEZLA 30 mg, Oral, 2 times daily    pramipexole (MIRAPEX) 0.25 mg, Oral, Nightly    propranoloL (INDERAL) 10 mg, Oral, 2 times daily    safety needles 25 gauge x 1 1/2" Ndle Use to inject every 7 days    syringe, disposable, 1 mL Syrg Use 1 Syringe once a week.    TALTZ AUTOINJECTOR 80 mg, Subcutaneous, Every 28 days    thyroid, pork, (NP THYROID) 15 mg Tab Take 1 tablet (15 mg total) by mouth daily before breakfast with water    topiramate (TOPAMAX) 50 mg, Oral, 2 times daily    ubrogepant (UBRELVY) 100 mg tablet Start 1 tablet (ubrelvy 100 mg) for headache attacks. May repeat once in 1 hours to a max of 2 per day    ZEPBOUND 5 mg, Subcutaneous, Every 7 days    ZOLMitriptan (ZOMIG) 5 mg Spry Use 1 spray in one nostril as needed for migraine. May repeat once in 2 hours. No more than 2 days/week.         Objective:     There were no vitals filed for this visit.   There is no height or weight on file to calculate BMI.     Physical Examinations:  Physical Exam     Disease Assessment Scores:  Patient's Global Assessment of arthritis (0-10): 2  Physician's Global Assessment of arthritis (0-10): 2  Number of Tender Joints (0-28): 2  Number of Swollen Joints (0-28): 2        2/5/2024     8:39 AM   Rapid3 Question Responses and Scores   MDHAQ Score 2   Psychologic Score 7.7   Pain Score 6   When you awakened in the morning OVER THE LAST WEEK, did you feel stiff? Yes   If Yes, please indicate the number of hours until you are as limber as you will be for the day 8   Fatigue Score 10   Global Health Score 10   RAPID3 Score 7.56       Monitoring Lab Results:  Lab Results   Component " "Value Date    WBC 8.16 02/07/2024    RBC 5.37 02/07/2024    HGB 15.7 02/07/2024    HCT 46.9 02/07/2024    MCV 87 02/07/2024    MCH 29.2 02/07/2024    MCHC 33.5 02/07/2024    RDW 13.0 02/07/2024     02/07/2024        Lab Results   Component Value Date     02/07/2024    K 3.5 02/07/2024     02/07/2024    CO2 22 (L) 02/07/2024    GLU 94 02/07/2024    BUN 6 02/07/2024    CREATININE 0.8 02/07/2024    CALCIUM 9.3 02/07/2024    PROT 6.9 02/07/2024    ALBUMIN 4.1 02/07/2024    BILITOT 1.1 (H) 02/07/2024    ALKPHOS 47 (L) 02/07/2024    AST 17 02/07/2024    ALT 24 02/07/2024    ANIONGAP 9 02/07/2024    EGFRNORACEVR >60.0 02/07/2024       Lab Results   Component Value Date    SEDRATE <2 02/07/2024    CRP 1.6 02/07/2024        Lab Results   Component Value Date    OTLTRRSD30FB 42 06/10/2022    VKAPJDSJ04 >2000 (H) 06/03/2021        Lab Results   Component Value Date    CHOL 200 (H) 06/10/2022    HDL 74 06/10/2022    LDLCALC 111.0 06/10/2022    TRIG 75 06/10/2022       Lab Results   Component Value Date    CCPANTIBODIE <0.5 06/03/2021     Lab Results   Component Value Date    ANASCREEN Negative <1:80 06/10/2022    ANATITER 1:80 04/06/2021    DSDNA Negative 1:10 04/06/2021     No results found for: "HLABB27"    Infectious Disease Screening:  Lab Results   Component Value Date    HEPBSAG Non-reactive 02/07/2024    HEPBCAB Non-reactive 02/07/2024    HEPBSAB 88.40 02/07/2024    HEPBSAB Reactive 02/07/2024    HEPBSURFABQU POSITIVE 02/07/2024    HEPBSURFABQU 96 02/07/2024     Lab Results   Component Value Date    HEPCAB Non-reactive 02/07/2024     Lab Results   Component Value Date    TBGOLDPLUS Positive (A) 02/07/2024     No results found for: "QUANTIFERON", "SVCMT", "QUANTAGVALUE", "QUANTNILVALU", "QUANTMITOGEN", "QFTTBAG", "QINT"     Imaging:  DEXA, Xrays, MRIs, CTs, etc    Old & Outside Medical Records:  Reviewed old and all outside medical records available in Care Everywhere     Assessment:     Encounter " Diagnoses   Name Primary?    MTHFR mutation Yes    SVT (supraventricular tachycardia)     Intractable migraine with status migrainosus, unspecified migraine type     BMI 30.0-30.9,adult     PSA (psoriatic arthritis)     Lipid disorder     Trigeminal neuralgia     Urinary tract infection without hematuria, site unspecified        Plan:      Encounter Diagnoses   Name Primary?    MTHFR mutation Yes    SVT (supraventricular tachycardia)     Intractable migraine with status migrainosus, unspecified migraine type     BMI 30.0-30.9,adult     PSA (psoriatic arthritis)     Lipid disorder     Trigeminal neuralgia      Diagnoses and all orders for this visit:    MTHFR mutation  -     leucovorin (WELLCOVORIN) 5 mg Tab; Take 1 tablet (5 mg total) by mouth once daily.  -     Sedimentation rate; Future  -     C-Reactive Protein; Future  -     Comprehensive Metabolic Panel; Future  -     CBC Auto Differential; Future  -     Anti-Thyroglobulin Antibody; Future  -     T4, Free; Future  -     Thyroid Peroxidase Antibody; Future  -     TSH; Future  -     T3, Free; Future  -     LIPID PANEL; Future    SVT (supraventricular tachycardia)  -     Sedimentation rate; Future  -     C-Reactive Protein; Future  -     Comprehensive Metabolic Panel; Future  -     CBC Auto Differential; Future  -     Anti-Thyroglobulin Antibody; Future  -     T4, Free; Future  -     Thyroid Peroxidase Antibody; Future  -     TSH; Future  -     T3, Free; Future  -     LIPID PANEL; Future    Intractable migraine with status migrainosus, unspecified migraine type  -     propranoloL (INDERAL) 10 MG tablet; Take 1 tablet (10 mg total) by mouth 2 (two) times daily.  -     Sedimentation rate; Future  -     C-Reactive Protein; Future  -     Comprehensive Metabolic Panel; Future  -     CBC Auto Differential; Future  -     Anti-Thyroglobulin Antibody; Future  -     T4, Free; Future  -     Thyroid Peroxidase Antibody; Future  -     TSH; Future  -     T3, Free; Future  -      LIPID PANEL; Future    BMI 30.0-30.9,adult  -     tirzepatide, weight loss, (ZEPBOUND) 5 mg/0.5 mL PnIj; Inject 5 mg into the skin every 7 days.    PSA (psoriatic arthritis)  -     ixekizumab (TALTZ AUTOINJECTOR) 80 mg/mL AtIn; Inject 80 mg into the skin every 28 days.  -     Sedimentation rate; Future  -     C-Reactive Protein; Future  -     Comprehensive Metabolic Panel; Future  -     CBC Auto Differential; Future  -     Anti-Thyroglobulin Antibody; Future  -     T4, Free; Future  -     Thyroid Peroxidase Antibody; Future  -     TSH; Future  -     T3, Free; Future  -     LIPID PANEL; Future    Lipid disorder  -     LIPID PANEL; Future    Trigeminal neuralgia  -     carBAMazepine (TEGRETOL XR) 100 MG 12 hr tablet; Take 1 tablet (100 mg total) by mouth 2 (two) times daily.        1. Start taltz trial again  2. Labs ordered  3. Zepbound, ordered  4. Econsult, neuro     Follow-up 6 months    The patient location is: home  The chief complaint leading to consultation is: psa,mctd    Visit type: audiovisual    Face to Face time with patient: 45   minutes of total time spent on the encounter, which includes face to face time and non-face to face time preparing to see the patient (eg, review of tests), Obtaining and/or reviewing separately obtained history, Documenting clinical information in the electronic or other health record, Independently interpreting results (not separately reported) and communicating results to the patient/family/caregiver, or Care coordination (not separately reported).         Each patient to whom he or she provides medical services by telemedicine is:  (1) informed of the relationship between the physician and patient and the respective role of any other health care provider with respect to management of the patient; and (2) notified that he or she may decline to receive medical services by telemedicine and may withdraw from such care at any time.    Notes:

## 2025-01-24 ENCOUNTER — PATIENT MESSAGE (OUTPATIENT)
Dept: CARDIOLOGY | Facility: CLINIC | Age: 50
End: 2025-01-24
Payer: COMMERCIAL

## 2025-01-24 ENCOUNTER — LAB VISIT (OUTPATIENT)
Dept: LAB | Facility: HOSPITAL | Age: 50
End: 2025-01-24
Attending: OBSTETRICS & GYNECOLOGY
Payer: COMMERCIAL

## 2025-01-24 ENCOUNTER — PATIENT MESSAGE (OUTPATIENT)
Dept: OTOLARYNGOLOGY | Facility: CLINIC | Age: 50
End: 2025-01-24
Payer: COMMERCIAL

## 2025-01-24 DIAGNOSIS — Z15.89 MTHFR MUTATION: ICD-10-CM

## 2025-01-24 DIAGNOSIS — Z01.419 WELL WOMAN EXAM WITH ROUTINE GYNECOLOGICAL EXAM: ICD-10-CM

## 2025-01-24 DIAGNOSIS — L40.50 PSA (PSORIATIC ARTHRITIS): ICD-10-CM

## 2025-01-24 DIAGNOSIS — G43.711 INTRACTABLE CHRONIC MIGRAINE WITHOUT AURA AND WITH STATUS MIGRAINOSUS: ICD-10-CM

## 2025-01-24 DIAGNOSIS — G43.911 INTRACTABLE MIGRAINE WITH STATUS MIGRAINOSUS, UNSPECIFIED MIGRAINE TYPE: ICD-10-CM

## 2025-01-24 DIAGNOSIS — I47.10 SVT (SUPRAVENTRICULAR TACHYCARDIA): ICD-10-CM

## 2025-01-24 DIAGNOSIS — E78.9 LIPID DISORDER: ICD-10-CM

## 2025-01-24 LAB
ALBUMIN SERPL BCP-MCNC: 4.1 G/DL (ref 3.5–5.2)
ALP SERPL-CCNC: 54 U/L (ref 40–150)
ALT SERPL W/O P-5'-P-CCNC: 26 U/L (ref 10–44)
ANION GAP SERPL CALC-SCNC: 11 MMOL/L (ref 8–16)
AST SERPL-CCNC: 20 U/L (ref 10–40)
BASOPHILS # BLD AUTO: 0.15 K/UL (ref 0–0.2)
BASOPHILS NFR BLD: 1.5 % (ref 0–1.9)
BILIRUB SERPL-MCNC: 0.5 MG/DL (ref 0.1–1)
BUN SERPL-MCNC: 8 MG/DL (ref 6–20)
CALCIUM SERPL-MCNC: 9.3 MG/DL (ref 8.7–10.5)
CHLORIDE SERPL-SCNC: 107 MMOL/L (ref 95–110)
CHOLEST SERPL-MCNC: 197 MG/DL (ref 120–199)
CHOLEST/HDLC SERPL: 2.8 {RATIO} (ref 2–5)
CO2 SERPL-SCNC: 23 MMOL/L (ref 23–29)
CREAT SERPL-MCNC: 0.9 MG/DL (ref 0.5–1.4)
CRP SERPL-MCNC: 5.5 MG/L (ref 0–8.2)
DIFFERENTIAL METHOD BLD: NORMAL
EOSINOPHIL # BLD AUTO: 0.3 K/UL (ref 0–0.5)
EOSINOPHIL NFR BLD: 3.1 % (ref 0–8)
ERYTHROCYTE [DISTWIDTH] IN BLOOD BY AUTOMATED COUNT: 12.2 % (ref 11.5–14.5)
ERYTHROCYTE [SEDIMENTATION RATE] IN BLOOD BY PHOTOMETRIC METHOD: 7 MM/HR (ref 0–36)
EST. GFR  (NO RACE VARIABLE): >60 ML/MIN/1.73 M^2
GLUCOSE SERPL-MCNC: 105 MG/DL (ref 70–110)
HCT VFR BLD AUTO: 45.1 % (ref 37–48.5)
HDLC SERPL-MCNC: 71 MG/DL (ref 40–75)
HDLC SERPL: 36 % (ref 20–50)
HGB BLD-MCNC: 14.8 G/DL (ref 12–16)
IMM GRANULOCYTES # BLD AUTO: 0.03 K/UL (ref 0–0.04)
IMM GRANULOCYTES NFR BLD AUTO: 0.3 % (ref 0–0.5)
LDLC SERPL CALC-MCNC: 97 MG/DL (ref 63–159)
LYMPHOCYTES # BLD AUTO: 2 K/UL (ref 1–4.8)
LYMPHOCYTES NFR BLD: 20.2 % (ref 18–48)
MCH RBC QN AUTO: 28.6 PG (ref 27–31)
MCHC RBC AUTO-ENTMCNC: 32.8 G/DL (ref 32–36)
MCV RBC AUTO: 87 FL (ref 82–98)
MONOCYTES # BLD AUTO: 0.7 K/UL (ref 0.3–1)
MONOCYTES NFR BLD: 6.9 % (ref 4–15)
NEUTROPHILS # BLD AUTO: 6.7 K/UL (ref 1.8–7.7)
NEUTROPHILS NFR BLD: 68 % (ref 38–73)
NONHDLC SERPL-MCNC: 126 MG/DL
NRBC BLD-RTO: 0 /100 WBC
PLATELET # BLD AUTO: 360 K/UL (ref 150–450)
PMV BLD AUTO: 11.2 FL (ref 9.2–12.9)
POTASSIUM SERPL-SCNC: 4.2 MMOL/L (ref 3.5–5.1)
PROT SERPL-MCNC: 7.2 G/DL (ref 6–8.4)
RBC # BLD AUTO: 5.18 M/UL (ref 4–5.4)
SODIUM SERPL-SCNC: 141 MMOL/L (ref 136–145)
TRIGL SERPL-MCNC: 145 MG/DL (ref 30–150)
WBC # BLD AUTO: 9.86 K/UL (ref 3.9–12.7)

## 2025-01-24 PROCEDURE — 80053 COMPREHEN METABOLIC PANEL: CPT | Performed by: INTERNAL MEDICINE

## 2025-01-24 PROCEDURE — 84439 ASSAY OF FREE THYROXINE: CPT | Performed by: INTERNAL MEDICINE

## 2025-01-24 PROCEDURE — 86376 MICROSOMAL ANTIBODY EACH: CPT | Performed by: INTERNAL MEDICINE

## 2025-01-24 PROCEDURE — 84481 FREE ASSAY (FT-3): CPT | Performed by: INTERNAL MEDICINE

## 2025-01-24 PROCEDURE — 80061 LIPID PANEL: CPT | Performed by: INTERNAL MEDICINE

## 2025-01-24 PROCEDURE — 85652 RBC SED RATE AUTOMATED: CPT | Performed by: INTERNAL MEDICINE

## 2025-01-24 PROCEDURE — 85025 COMPLETE CBC W/AUTO DIFF WBC: CPT | Performed by: INTERNAL MEDICINE

## 2025-01-24 PROCEDURE — 84443 ASSAY THYROID STIM HORMONE: CPT | Performed by: INTERNAL MEDICINE

## 2025-01-24 PROCEDURE — 86140 C-REACTIVE PROTEIN: CPT | Performed by: INTERNAL MEDICINE

## 2025-01-24 PROCEDURE — 86304 IMMUNOASSAY TUMOR CA 125: CPT | Performed by: OBSTETRICS & GYNECOLOGY

## 2025-01-24 PROCEDURE — 36415 COLL VENOUS BLD VENIPUNCTURE: CPT | Mod: PO | Performed by: OBSTETRICS & GYNECOLOGY

## 2025-01-24 PROCEDURE — 86800 THYROGLOBULIN ANTIBODY: CPT | Performed by: INTERNAL MEDICINE

## 2025-01-24 RX ORDER — PREDNISONE 20 MG/1
20 TABLET ORAL DAILY
Qty: 5 TABLET | Refills: 0 | Status: SHIPPED | OUTPATIENT
Start: 2025-01-24 | End: 2025-01-29

## 2025-01-24 RX ORDER — UBROGEPANT 100 MG/1
TABLET ORAL
Qty: 16 TABLET | Refills: 2 | Status: SHIPPED | OUTPATIENT
Start: 2025-01-24

## 2025-01-24 RX ORDER — FLUTICASONE PROPIONATE 50 MCG
1 SPRAY, SUSPENSION (ML) NASAL 2 TIMES DAILY
Qty: 16 G | Refills: 11 | Status: SHIPPED | OUTPATIENT
Start: 2025-01-24 | End: 2026-01-24

## 2025-01-24 RX ORDER — AZELASTINE 1 MG/ML
1 SPRAY, METERED NASAL 2 TIMES DAILY
Qty: 30 ML | Refills: 11 | Status: SHIPPED | OUTPATIENT
Start: 2025-01-24 | End: 2026-01-24

## 2025-01-25 ENCOUNTER — PATIENT MESSAGE (OUTPATIENT)
Dept: OBSTETRICS AND GYNECOLOGY | Facility: CLINIC | Age: 50
End: 2025-01-25
Payer: COMMERCIAL

## 2025-01-25 DIAGNOSIS — R97.1 ELEVATED CANCER ANTIGEN 125 (CA 125): Primary | ICD-10-CM

## 2025-01-25 LAB
CANCER AG125 SERPL-ACNC: 35 U/ML (ref 0–30)
T3FREE SERPL-MCNC: 2.9 PG/ML (ref 2.3–4.2)
T4 FREE SERPL-MCNC: 0.75 NG/DL (ref 0.71–1.51)
THYROGLOB AB SERPL IA-ACNC: <4 IU/ML (ref 0–3.9)
THYROPEROXIDASE IGG SERPL-ACNC: <6 IU/ML
TSH SERPL DL<=0.005 MIU/L-ACNC: 0.99 UIU/ML (ref 0.4–4)

## 2025-01-26 ENCOUNTER — TELEPHONE (OUTPATIENT)
Dept: OBSTETRICS AND GYNECOLOGY | Facility: CLINIC | Age: 50
End: 2025-01-26
Payer: COMMERCIAL

## 2025-01-28 RX ORDER — TIRZEPATIDE 5 MG/.5ML
5 INJECTION, SOLUTION SUBCUTANEOUS
Qty: 2 ML | Refills: 5 | Status: SHIPPED | OUTPATIENT
Start: 2025-01-28 | End: 2025-07-27

## 2025-02-03 ENCOUNTER — HOSPITAL ENCOUNTER (OUTPATIENT)
Dept: RADIOLOGY | Facility: HOSPITAL | Age: 50
Discharge: HOME | End: 2025-02-03
Attending: OBSTETRICS & GYNECOLOGY
Payer: COMMERCIAL

## 2025-02-03 DIAGNOSIS — R97.1 ELEVATED CANCER ANTIGEN 125 (CA 125): ICD-10-CM

## 2025-02-03 PROCEDURE — 76830 TRANSVAGINAL US NON-OB: CPT | Mod: 26,,, | Performed by: RADIOLOGY

## 2025-02-03 PROCEDURE — 76856 US EXAM PELVIC COMPLETE: CPT | Mod: TC,PO

## 2025-02-03 PROCEDURE — 76856 US EXAM PELVIC COMPLETE: CPT | Mod: 26,,, | Performed by: RADIOLOGY

## 2025-02-04 ENCOUNTER — PATIENT MESSAGE (OUTPATIENT)
Dept: OBSTETRICS AND GYNECOLOGY | Facility: CLINIC | Age: 50
End: 2025-02-04
Payer: COMMERCIAL

## 2025-02-05 ENCOUNTER — HOSPITAL ENCOUNTER (OUTPATIENT)
Dept: RADIOLOGY | Facility: HOSPITAL | Age: 50
Discharge: HOME | End: 2025-02-05
Payer: COMMERCIAL

## 2025-02-05 ENCOUNTER — OFFICE VISIT (OUTPATIENT)
Dept: CARDIOLOGY | Facility: CLINIC | Age: 50
End: 2025-02-05
Payer: COMMERCIAL

## 2025-02-05 VITALS
WEIGHT: 201.25 LBS | HEART RATE: 80 BPM | SYSTOLIC BLOOD PRESSURE: 134 MMHG | HEIGHT: 69 IN | DIASTOLIC BLOOD PRESSURE: 84 MMHG | BODY MASS INDEX: 29.81 KG/M2

## 2025-02-05 DIAGNOSIS — R00.2 PALPITATIONS: ICD-10-CM

## 2025-02-05 DIAGNOSIS — J90 CHRONIC BILATERAL PLEURAL EFFUSIONS: ICD-10-CM

## 2025-02-05 DIAGNOSIS — R06.00 DYSPNEA, UNSPECIFIED TYPE: Primary | ICD-10-CM

## 2025-02-05 DIAGNOSIS — R06.00 DYSPNEA, UNSPECIFIED TYPE: ICD-10-CM

## 2025-02-05 DIAGNOSIS — R00.0 TACHYCARDIA, UNSPECIFIED: ICD-10-CM

## 2025-02-05 PROCEDURE — 3079F DIAST BP 80-89 MM HG: CPT | Mod: CPTII,S$GLB,,

## 2025-02-05 PROCEDURE — 71046 X-RAY EXAM CHEST 2 VIEWS: CPT | Mod: TC,FY,PO

## 2025-02-05 PROCEDURE — 71046 X-RAY EXAM CHEST 2 VIEWS: CPT | Mod: 26,,, | Performed by: RADIOLOGY

## 2025-02-05 PROCEDURE — 3008F BODY MASS INDEX DOCD: CPT | Mod: CPTII,S$GLB,,

## 2025-02-05 PROCEDURE — 1159F MED LIST DOCD IN RCRD: CPT | Mod: CPTII,S$GLB,,

## 2025-02-05 PROCEDURE — 3075F SYST BP GE 130 - 139MM HG: CPT | Mod: CPTII,S$GLB,,

## 2025-02-05 PROCEDURE — 99999 PR PBB SHADOW E&M-EST. PATIENT-LVL III: CPT | Mod: PBBFAC,,,

## 2025-02-05 PROCEDURE — 99214 OFFICE O/P EST MOD 30 MIN: CPT | Mod: S$GLB,,,

## 2025-02-05 NOTE — PROGRESS NOTES
Subjective:    Patient ID:  Loren Lemus is a 49 y.o. female patient here for evaluation Follow-up    History of Present Illness:     Usually follows with Dr. De La Rosa here today because her HR has been varying from 40 at night to >100 during the day. She is having chest pressure and DONOHUE. Presyncope when sitting, questions POTS. They saw bilateral pleural effusions on mammogram. Diagnosed with MTHR. Neuro wanted to start her on ideal for migraines, tachycardia, anxiety but she has not started yet.     Most Recent Echocardiogram Results  Results for orders placed during the hospital encounter of 08/26/24    Echo    Interpretation Summary    Left Ventricle: The left ventricle is normal in size. There is normal systolic function with a visually estimated ejection fraction of 55 - 60%. There is normal diastolic function.    Right Ventricle: Normal right ventricular cavity size. Systolic function is normal.    Pulmonary Artery: The estimated pulmonary artery systolic pressure is 18 mmHg.    IVC/SVC: Normal venous pressure at 3 mmHg.      Most Recent Nuclear Stress Test Results  No results found for this or any previous visit.      Most Recent Cardiac PET Stress Test Results  No results found for this or any previous visit.      Most Recent Cardiovascular Angiogram results  No results found for this or any previous visit.      Other Most Recent Cardiology Results  Results for orders placed during the hospital encounter of 08/26/24    Holter monitor - 72 hour    Interpretation Summary    The predominant rhythm is sinus.    Uneventful Holter monitor.    No symptoms reported      REVIEW OF SYSTEMS: As noted in HPI   CARDIOVASCULAR: No recent arm/neck/jaw pain, or edema.  RESPIRATORY: No recent fever, cough  : No blood in the urine  GI: No reflux, nausea, vomiting, or blood in stool.   MUSCULOSKELETAL: No falls.   NEURO: No headaches, syncope, or dizziness.  EYES: No sudden changes in vision.     Past Medical History:    Diagnosis Date    Allergy     Asthma     CVID (common variable immunodeficiency)     Endometriosis     Kidney stone     x 1    MCTD (mixed connective tissue disease)     Migraine     Miscarriage     MVP (mitral valve prolapse)     Rheumatoid arthritis     Systemic lupus erythematosus     Thyroid disease      Past Surgical History:   Procedure Laterality Date    BREAST SURGERY Bilateral     augmentation with implants    HYSTERECTOMY      KNEE SURGERY Left     PELVIC LAPAROSCOPY      SEPTORHINOPLASTY      SINUS SURGERY      TONSILLECTOMY      TYMPANOSTOMY TUBE PLACEMENT       Social History     Tobacco Use    Smoking status: Never    Smokeless tobacco: Never   Substance Use Topics    Alcohol use: Not Currently    Drug use: No         Objective      Vitals:    25 1121   BP: 134/84   Pulse: 80       LAST EKG  Results for orders placed or performed during the hospital encounter of 18   EKG 12-lead    Collection Time: 18 10:27 AM    [facility]                                                                                  Test Date:    2018  Pat Name:     KIRILL CARIAS         Department:     Patient ID:   [id number]                 Room:           Gender:       Female                   Technician:   FAHAD  :          1975               Requested By:   Order Number:                          Reading MD:   Khanh Brannon                                   Measurements  Intervals                              Axis            Rate:         67                       P:            68  IL:           148                      QRS:          1  QRSD:         80                       T:            40  QT:           408                                      QTc:          431                                                                 Interpretive Statements  Normal sinus rhythm with sinus arrhythmia  Normal ECG  No previous ECG available for  comparison    Electronically Signed On 3- 12:20:01 CDT by Khanh Brannon       LIPIDS - LAST 2   Lab Results   Component Value Date    CHOL 197 01/24/2025    CHOL 200 (H) 06/10/2022    HDL 71 01/24/2025    HDL 74 06/10/2022    LDLCALC 97.0 01/24/2025    LDLCALC 111.0 06/10/2022    TRIG 145 01/24/2025    TRIG 75 06/10/2022    CHOLHDL 36.0 01/24/2025    CHOLHDL 37.0 06/10/2022     CARDIAC PROFILE - LAST 2  Lab Results   Component Value Date    CPK 43 04/06/2021     04/06/2021    TROPONINI <0.012 03/10/2018    TROPONINI <0.012 03/09/2018      CBC - LAST 2  Lab Results   Component Value Date    WBC 9.86 01/24/2025    WBC 8.16 02/07/2024    HGB 14.8 01/24/2025    HGB 15.7 02/07/2024    HCT 45.1 01/24/2025    HCT 46.9 02/07/2024     01/24/2025     02/07/2024     Lab Results   Component Value Date    APTT 23.0 (L) 01/18/2017     CHEMISTRY - LAST 2  Lab Results   Component Value Date     01/24/2025     02/07/2024    K 4.2 01/24/2025    K 3.5 02/07/2024    CO2 23 01/24/2025    CO2 22 (L) 02/07/2024    BUN 8 01/24/2025    BUN 6 02/07/2024    CREATININE 0.9 01/24/2025    CREATININE 0.8 02/07/2024     01/24/2025    GLU 94 02/07/2024    CALCIUM 9.3 01/24/2025    CALCIUM 9.3 02/07/2024    MG 2.1 04/06/2021    ALBUMIN 4.1 01/24/2025    ALBUMIN 4.1 02/07/2024    ALT 26 01/24/2025    ALT 24 02/07/2024    AST 20 01/24/2025    AST 17 02/07/2024      ENDOCRINE - LAST 2  Lab Results   Component Value Date    HGBA1C 4.9 06/10/2022    HGBA1C 5.3 03/26/2021    TSH 0.986 01/24/2025    TSH 0.725 02/07/2024        PHYSICAL EXAM  CONSTITUTIONAL: Well built, well nourished in no apparent distress  NECK: no carotid bruit, no JVD  LUNGS: CTA  CHEST WALL: no tenderness  HEART: regular rate and rhythm, S1, S2 normal, no murmur, click, rub or gallop   ABDOMEN: soft, non-tender; bowel sounds normal; no masses,  no organomegaly  EXTREMITIES: Extremities normal, no edema, no calf tenderness noted  NEURO: AAO X  "3    I HAVE REVIEWED :    The vital signs, most recent cardiac testing, and most recent pertinent non-cardiology provider notes.    Current Outpatient Medications   Medication Instructions    AIMOVIG AUTOINJECTOR 140 mg, Subcutaneous, Every 28 days    aspirin 81 mg, Daily    azelastine (ASTELIN) 137 mcg (0.1 %) nasal spray Use one spray in each nostril 2 (two) times daily.    blunt needle, disposable 18 x 1 1/2 " Ndle Use to inject once a week    carBAMazepine (TEGRETOL XR) 100 mg, Oral, 2 times daily    cetirizine (ZYRTEC) 10 mg, 2 times daily    DULoxetine (CYMBALTA) 30 mg, Oral, 2 times daily    fluconazole (DIFLUCAN) 100 mg, Oral, Daily    fluticasone propionate (FLONASE) 50 mcg/actuation nasal spray Use one spray in each nostril twice daily    gentamicin (GARAMYCIN) 0.3 % ophthalmic solution 1 drop, Both Eyes, 3 times daily    leucovorin (WELLCOVORIN) 5 mg, Oral, Daily    naproxen sodium (ANAPROX) 550 mg, Oral, 2 times daily    ondansetron (ZOFRAN) 8 mg, As needed (PRN)    ondansetron (ZOFRAN) 4 mg, Oral, Every 6 hours PRN    OTEZLA 30 mg, Oral, 2 times daily    pramipexole (MIRAPEX) 0.25 mg, Oral, Nightly    propranoloL (INDERAL) 10 mg, Oral, 2 times daily    safety needles 25 gauge x 1 1/2" Ndle Use to inject every 7 days    syringe, disposable, 1 mL Syrg Use 1 Syringe once a week.    TALTZ AUTOINJECTOR 80 mg, Subcutaneous, Every 28 days    thyroid, pork, (NP THYROID) 15 mg Tab Take 1 tablet (15 mg total) by mouth daily before breakfast with water    topiramate (TOPAMAX) 50 mg, Oral, 2 times daily    ubrogepant (UBRELVY) 100 mg tablet Start 1 tablet (ubrelvy 100 mg) by mouth for headache attacks.   May repeat once in 1 hours to a max of 2 per day.    ZEPBOUND 5 mg, Subcutaneous, Every 7 days    ZEPBOUND 5 mg, Subcutaneous, Every 7 days    ZOLMitriptan (ZOMIG) 5 mg Spry Use 1 spray in one nostril as needed for migraine. May repeat once in 2 hours. No more than 2 days/week.        Assessment & Plan   1. " Dyspnea, unspecified type (Primary)  - X-Ray Chest PA And Lateral; Future  - Cardiac Monitor - 3-15 Day Adult (Cupid Only); Future  - Stress Echo Which stress agent will be used? Pharmacological; Color Flow Doppler? No; Future  - B-TYPE NATRIURETIC PEPTIDE; Future  - D-DIMER, QUANTITATIVE; Future  - Troponin I; Future    2. Palpitations  - X-Ray Chest PA And Lateral; Future  - Cardiac Monitor - 3-15 Day Adult (Cupid Only); Future  - Stress Echo Which stress agent will be used? Pharmacological; Color Flow Doppler? No; Future  - B-TYPE NATRIURETIC PEPTIDE; Future  - D-DIMER, QUANTITATIVE; Future  - Troponin I; Future    3. Bilateral pleural effusions  - X-Ray Chest PA And Lateral; Future  - Cardiac Monitor - 3-15 Day Adult (Cupid Only); Future  - Stress Echo Which stress agent will be used? Pharmacological; Color Flow Doppler? No; Future  - B-TYPE NATRIURETIC PEPTIDE; Future  - D-DIMER, QUANTITATIVE; Future  - Troponin I; Future    4. Tachycardia, unspecified  Exercise stress echo and 14 day monitor for above   Will start inderal if RHR >90 bpm         6 mo    Naomi Peters, PA-C [facility]   Office: [phone]

## 2025-02-06 ENCOUNTER — HOSPITAL ENCOUNTER (OUTPATIENT)
Dept: CARDIOLOGY | Facility: HOSPITAL | Age: 50
Discharge: HOME | End: 2025-02-06
Payer: COMMERCIAL

## 2025-02-06 DIAGNOSIS — R06.00 DYSPNEA, UNSPECIFIED TYPE: ICD-10-CM

## 2025-02-06 DIAGNOSIS — R00.2 PALPITATIONS: ICD-10-CM

## 2025-02-06 DIAGNOSIS — J90 CHRONIC BILATERAL PLEURAL EFFUSIONS: ICD-10-CM

## 2025-02-06 PROCEDURE — 93246 EXT ECG>7D<15D RECORDING: CPT | Mod: PO

## 2025-02-06 PROCEDURE — 93248 EXT ECG>7D<15D REV&INTERPJ: CPT | Mod: ,,, | Performed by: INTERNAL MEDICINE

## 2025-02-12 ENCOUNTER — PATIENT MESSAGE (OUTPATIENT)
Dept: OBSTETRICS AND GYNECOLOGY | Facility: CLINIC | Age: 50
End: 2025-02-12
Payer: COMMERCIAL

## 2025-02-12 DIAGNOSIS — Z80.3 FAMILY HISTORY OF BREAST CANCER: Primary | ICD-10-CM

## 2025-02-18 ENCOUNTER — HOSPITAL ENCOUNTER (OUTPATIENT)
Dept: CARDIOLOGY | Facility: HOSPITAL | Age: 50
Discharge: HOME | End: 2025-02-18
Payer: COMMERCIAL

## 2025-02-18 VITALS — HEIGHT: 69 IN | BODY MASS INDEX: 29.77 KG/M2 | WEIGHT: 201 LBS

## 2025-02-18 DIAGNOSIS — J90 CHRONIC BILATERAL PLEURAL EFFUSIONS: ICD-10-CM

## 2025-02-18 DIAGNOSIS — R06.00 DYSPNEA, UNSPECIFIED TYPE: ICD-10-CM

## 2025-02-18 DIAGNOSIS — R00.2 PALPITATIONS: ICD-10-CM

## 2025-02-18 PROCEDURE — 93351 STRESS TTE COMPLETE: CPT | Mod: PO

## 2025-02-19 ENCOUNTER — RESULTS FOLLOW-UP (OUTPATIENT)
Dept: CARDIOLOGY | Facility: CLINIC | Age: 50
End: 2025-02-19
Payer: COMMERCIAL

## 2025-02-19 ENCOUNTER — PATIENT MESSAGE (OUTPATIENT)
Dept: RHEUMATOLOGY | Facility: CLINIC | Age: 50
End: 2025-02-19
Payer: COMMERCIAL

## 2025-02-19 LAB
BSA FOR ECHO PROCEDURE: 2.11 M2
CV ECHO LV RWT: 0.33 CM
CV STRESS BASE HR: 73 BPM
DIASTOLIC BLOOD PRESSURE: 67 MMHG
DOP CALC LVOT AREA: 4.2 CM2
DOP CALC LVOT DIAMETER: 2.3 CM
DOP CALC LVOT PEAK VEL: 0.9 M/S
DOP CALC LVOT STROKE VOLUME: 80.1 CM3
DOP CALCLVOT PEAK VEL VTI: 19.3 CM
E WAVE DECELERATION TIME: 212 MSEC
E/A RATIO: 0.81
E/E' RATIO: 7 M/S
ECHO LV POSTERIOR WALL: 0.7 CM (ref 0.6–1.1)
FRACTIONAL SHORTENING: 35.7 % (ref 28–44)
INTERVENTRICULAR SEPTUM: 0.8 CM (ref 0.6–1.1)
LEFT ATRIUM AREA SYSTOLIC (APICAL 2 CHAMBER): 11.91 CM2
LEFT ATRIUM AREA SYSTOLIC (APICAL 4 CHAMBER): 11.48 CM2
LEFT ATRIUM SIZE: 2.7 CM
LEFT ATRIUM VOLUME INDEX MOD: 13 ML/M2
LEFT ATRIUM VOLUME MOD: 26 ML
LEFT INTERNAL DIMENSION IN SYSTOLE: 2.7 CM (ref 2.1–4)
LEFT VENTRICLE DIASTOLIC VOLUME INDEX: 38.06 ML/M2
LEFT VENTRICLE DIASTOLIC VOLUME: 78.79 ML
LEFT VENTRICLE END SYSTOLIC VOLUME APICAL 2 CHAMBER: 27.5 ML
LEFT VENTRICLE END SYSTOLIC VOLUME APICAL 4 CHAMBER: 24.09 ML
LEFT VENTRICLE MASS INDEX: 44.9 G/M2
LEFT VENTRICLE SYSTOLIC VOLUME INDEX: 13.4 ML/M2
LEFT VENTRICLE SYSTOLIC VOLUME: 27.79 ML
LEFT VENTRICULAR INTERNAL DIMENSION IN DIASTOLE: 4.2 CM (ref 3.5–6)
LEFT VENTRICULAR MASS: 93 G
LV LATERAL E/E' RATIO: 7.1 M/S
LV SEPTAL E/E' RATIO: 7.1 M/S
LVED V (TEICH): 78.79 ML
LVES V (TEICH): 27.79 ML
LVOT MG: 1.9 MMHG
LVOT MV: 0.67 CM/S
MV PEAK A VEL: 0.79 M/S
MV PEAK E VEL: 0.64 M/S
OHS CV CPX 1 MINUTE RECOVERY HEART RATE: 139 BPM
OHS CV CPX 85 PERCENT MAX PREDICTED HEART RATE MALE: 145
OHS CV CPX MAX PREDICTED HEART RATE: 170
OHS CV CPX PATIENT IS FEMALE: 1
OHS CV CPX PATIENT IS MALE: 0
OHS CV CPX PEAK DIASTOLIC BLOOD PRESSURE: 64 MMHG
OHS CV CPX PEAK HEAR RATE: 142 BPM
OHS CV CPX PEAK RATE PRESSURE PRODUCT: NORMAL
OHS CV CPX PEAK SYSTOLIC BLOOD PRESSURE: 135 MMHG
OHS CV CPX PERCENT MAX PREDICTED HEART RATE ACHIEVED: 88
OHS CV CPX RATE PRESSURE PRODUCT PRESENTING: 7592
OHS CV INITIAL DOSE: 10 MCG/KG/MIN
OHS CV PEAK DOSE: 20 MCG/KG/MIN
PISA TR MAX VEL: 2.1 M/S
RA PRESSURE ESTIMATED: 3 MMHG
RV TB RVSP: 5 MMHG
SINUS: 2.73 CM
SYSTOLIC BLOOD PRESSURE: 104 MMHG
TDI LATERAL: 0.09 M/S
TDI SEPTAL: 0.09 M/S
TDI: 0.09 M/S
TR MAX PG: 18 MMHG
TV REST PULMONARY ARTERY PRESSURE: 21 MMHG
Z-SCORE OF LEFT VENTRICULAR DIMENSION IN END DIASTOLE: -4.04
Z-SCORE OF LEFT VENTRICULAR DIMENSION IN END SYSTOLE: -2.81

## 2025-02-21 ENCOUNTER — TELEPHONE (OUTPATIENT)
Dept: OBSTETRICS AND GYNECOLOGY | Facility: CLINIC | Age: 50
End: 2025-02-21
Payer: COMMERCIAL

## 2025-02-21 ENCOUNTER — RESULTS FOLLOW-UP (OUTPATIENT)
Dept: OBSTETRICS AND GYNECOLOGY | Facility: CLINIC | Age: 50
End: 2025-02-21
Payer: COMMERCIAL

## 2025-02-21 DIAGNOSIS — N83.209 CYST OF OVARY, UNSPECIFIED LATERALITY: Primary | ICD-10-CM

## 2025-02-24 NOTE — TELEPHONE ENCOUNTER
Hello. I sent a message on Friday but it appears it did not get to you. Per Dr Benson you can take an extra dose of propranolol when you have an episode, until we have the results of your monitor back.

## 2025-02-28 ENCOUNTER — PROCEDURE VISIT (OUTPATIENT)
Dept: NEUROLOGY | Facility: CLINIC | Age: 50
End: 2025-02-28
Payer: COMMERCIAL

## 2025-02-28 VITALS
WEIGHT: 198.88 LBS | SYSTOLIC BLOOD PRESSURE: 123 MMHG | BODY MASS INDEX: 29.37 KG/M2 | HEART RATE: 98 BPM | RESPIRATION RATE: 17 BRPM | DIASTOLIC BLOOD PRESSURE: 85 MMHG

## 2025-02-28 DIAGNOSIS — G43.711 INTRACTABLE CHRONIC MIGRAINE WITHOUT AURA AND WITH STATUS MIGRAINOSUS: ICD-10-CM

## 2025-02-28 DIAGNOSIS — R76.8 LOW SERUM IGG2 SUBCLASS LEVEL: ICD-10-CM

## 2025-02-28 DIAGNOSIS — K90.41 NON-CELIAC GLUTEN SENSITIVITY: ICD-10-CM

## 2025-02-28 DIAGNOSIS — M35.1 MCTD (MIXED CONNECTIVE TISSUE DISEASE): ICD-10-CM

## 2025-02-28 DIAGNOSIS — D84.9 IMMUNE DEFICIENCY DISORDER: ICD-10-CM

## 2025-02-28 DIAGNOSIS — M17.0 PRIMARY OSTEOARTHRITIS OF BOTH KNEES: ICD-10-CM

## 2025-02-28 DIAGNOSIS — D83.9 CVID (COMMON VARIABLE IMMUNODEFICIENCY): ICD-10-CM

## 2025-02-28 DIAGNOSIS — G43.711 INTRACTABLE CHRONIC MIGRAINE WITHOUT AURA AND WITH STATUS MIGRAINOSUS: Primary | ICD-10-CM

## 2025-02-28 DIAGNOSIS — R76.8 LOW SERUM IGG4 SUBCLASS LEVEL: ICD-10-CM

## 2025-02-28 DIAGNOSIS — R76.8 ANA POSITIVE: ICD-10-CM

## 2025-02-28 DIAGNOSIS — E06.3 HASHIMOTO'S THYROIDITIS: ICD-10-CM

## 2025-02-28 RX ORDER — TOPIRAMATE 50 MG/1
50 TABLET, FILM COATED ORAL 2 TIMES DAILY
Qty: 60 TABLET | Refills: 5 | Status: CANCELLED | OUTPATIENT
Start: 2025-02-28

## 2025-02-28 RX ORDER — ONDANSETRON 4 MG/1
4 TABLET, FILM COATED ORAL EVERY 6 HOURS PRN
Qty: 30 TABLET | Refills: 11 | Status: CANCELLED | OUTPATIENT
Start: 2025-02-28

## 2025-02-28 RX ORDER — DULOXETIN HYDROCHLORIDE 30 MG/1
30 CAPSULE, DELAYED RELEASE ORAL 2 TIMES DAILY
Qty: 60 CAPSULE | Refills: 5 | Status: CANCELLED | OUTPATIENT
Start: 2025-02-28

## 2025-02-28 RX ORDER — ONDANSETRON 4 MG/1
4 TABLET, FILM COATED ORAL EVERY 6 HOURS PRN
Qty: 30 TABLET | Refills: 1 | Status: SHIPPED | OUTPATIENT
Start: 2025-02-28

## 2025-02-28 NOTE — PROCEDURES
ProceduresBOTOX PROCEDURE    PROCEDURE PERFORMED: Botulinum toxin injection (60213)    CLINICAL INDICATION: G43.719    A time out was conducted just before the start of the procedure to verify the correct patient and procedure, procedure location, and all relevant critical information.   The patient meets criteria for chronic headaches according to the ICHD-II, the patient has more than 15 headaches a month out of at least 8 are migraines which last for more than 4 hours a day.    The Botox injections have achieved well over 50%  improvement in the patient's symptoms. Migraines have been reduced at least 7 days per month and the number of cumulative hours suffering with headaches has been reduced at least 100 total hours per month. Today she does have a headache indicating that the Botox has worn off. Frequency of treatment is every 3 months unless no response to the treatments, at which time we will discontinue the injections.      DESCRIPTION OF PROCEDURE: After obtaining informed consent and under aseptic technique, a total of 185 units of botulinum toxin type A were injected in the following muscles: Procerus 5 units,  5 units bilaterally, frontalis 20 units, temporalis 20 units bilaterally, Masseters 15 units bilaterally (3 sites, 5 units per site), occipitalis 15 units, upper cervical paraspinals 10 units bilaterally and trapezius 15 units bilaterally. The patient was given a total of 185 units in 37 sites.The patient tolerated the procedure well. There were no complications. The patient was given a prescription for repeat treatment in 3 months.     Unavoidable waste 15 units      The patient reports dizziness and imbalance, she has multiple co-morbidities. Will obtain MRI of the brain to rule out structural lesions.    Mitchell Hill M.D  Medical Director, Headache and Facial Pain  [facility]

## 2025-03-02 RX ORDER — TOPIRAMATE 50 MG/1
50 TABLET, FILM COATED ORAL 2 TIMES DAILY
Qty: 60 TABLET | Refills: 5 | Status: SHIPPED | OUTPATIENT
Start: 2025-03-02

## 2025-03-02 RX ORDER — DULOXETIN HYDROCHLORIDE 30 MG/1
30 CAPSULE, DELAYED RELEASE ORAL 2 TIMES DAILY
Qty: 60 CAPSULE | Refills: 5 | Status: SHIPPED | OUTPATIENT
Start: 2025-03-02

## 2025-03-03 PROBLEM — Z80.3 FAMILY HISTORY OF BREAST CANCER: Status: ACTIVE | Noted: 2025-03-03

## 2025-03-03 LAB
OHS CV EVENT MONITOR DAY: 13
OHS CV HOLTER HOOKUP DATE: NORMAL
OHS CV HOLTER HOOKUP TIME: NORMAL
OHS CV HOLTER LENGTH DECIMAL HOURS: 324
OHS CV HOLTER LENGTH HOURS: 12
OHS CV HOLTER LENGTH MINUTES: 0
OHS CV HOLTER SCAN DATE: NORMAL
OHS CV HOLTER SINUS AVERAGE HR: 78 BPM
OHS CV HOLTER SINUS MAX HR: 155 BPM
OHS CV HOLTER SINUS MIN HR: 44 BPM
OHS CV HOLTER STUDY END DATE: NORMAL
OHS CV HOLTER STUDY END TIME: NORMAL

## 2025-03-03 NOTE — PROGRESS NOTES
"Cancer Genetics  Hereditary and High-Risk Clinic  Department of Hematology and Oncology  [facility]    Date of Service:  3/11/25  Visit Provider:  Sana Bailey MS, [facility]    Patient ID  Name: Loren Lemus    : 1975    MRN: [id number]      Referring Provider  Wiedemann, Michael A., MD  [address] [address]    Face-to-face time with patient:  Approximately 33 minutes.    Approximately 55 minutes in total were spent on the day of this encounter, which includes face-to-face time and non-face-to-face time preparing to see the patient (e.g., review of records and tests), obtaining and/or reviewing separately obtained history, documenting clinical information in the electronic or other health record, independently interpreting results (not separately reported) and communicating results to the patient/family/caregiver, or care coordination (not separately reported).      IMPRESSION      Loren Lemus is a pleasant 49 y.o. female patient seen in genetic counseling given her family history of breast and ovarian cancer. Loren Lemus was unaccompanied today. She shared that she is concerned given her family history and recent CA-125 result. She also shared that her medical journey these past few years have been stressful and she now wants to focus on taking care of herself. We discussed that the combination of breast and ovarian cancer can be seen in individuals with hereditary predispositions. She meets NCCN HBOPP criteria based on her mother having ovarian cancer. She elected to proceed with genetic testing today through the [facility] xG panel with RNA. A blood sample was collected today 3/11/2025.    FOCUSED PERSONAL HISTORY     Chief Complaint: Genetic Evaluation (Family history of breast and ovarian cancer)    History of Present Illness (HPI):  Loren Lemus ("Loren"), 49 y.o., assigned female sex at birth, is new to the [facility] Department of " "Hematology and Oncology and to me.  She was referred by  OBGYN  for hereditary cancer risk assessment given her family history of breast cancer.    Cancer History-Masses/tumors/lesions  Post-Hysterectomy Pathology: 2027  CERVIX:   - MODERATE DYSPLASIA; CHRONIC CERVICITIS WITH SQUAMOUS METAPLASIA;    NABOTHIAN CYSTS.   ENDOMETRIUM :   - PROLIFERATIVE PHASE.   MYOMETRIUM:   - LEIOMYOMATA;  ADENOMYOSIS.   FALLOPIAN TUBES:   - HYDATID CYSTS OF MORGAGNI.   choroid plexus lipoma of the brain    Focused Medical History  Previous germline cancer genetic testing:  No  Colonoscopy: No  Mammogram: Yes  Most recent mammogram: does not do currently due to implants  Breast MRI:  Yes (1/2/2025)   Pancreatitis:  No    Focused Surgical History  Reproductive organs:  ovaries intact and s/p hysterectomy with cervicectomy; fallopian tubes removed    Breast Cancer Risk Assessment Questionnaire  Age:  49 y.o.   Race and ethnicity:  White, Not  or /a  Weight:  198 lb  Height:  5'9"  Mammographic breast density:  scattered fibroglandular densities    Age at menarche:  10y  Age at first live childbirth:  21  Menopausal status:  postmenopausal  Hormone replacement therapy use history:  None  OCPs: No  Breast biopsy history and findings:  No  Thoracic radiation therapy history:  No    Tobacco Use  Tobacco Use: Low Risk  (2/28/2025)    Patient History     Smoking Tobacco Use: Never     Smokeless Tobacco Use: Never     Passive Exposure: Not on file       Review of Systems   Patient's Stress Score today was 8/10 (with 10 being the worst).  Patient attributes this to her increase in health burdens these past few years.      FAMILY HISTORY     Cancer Pedigree       Maternal:  Mother with ovarian cancer in her 50s, and breast cancer in her 70s  Grandfather with lung cancer   Great-grandfather with lung cancer  Great-grandmother with breast cancer    Paternal:  Father with unknown pancreatic concern  Grandfather with colon " cancer  Grandmother with lung cancer    A family history of birth defects, intellectual disability, SIDS, sudden early death, multiple miscarriages and consanguinity were denied. Please refer to above pedigree for further details. A larger copy has been scanned in the Media tab.     DISCUSSION     Approximately 5-10% of breast cancers are due to hereditary causes. Things that make us suspicious of a hereditary cause of cancer include the type of cancers seen in the family, number of people with cancer, ages of people with cancer, and certain cancer pathologies. The majority of hereditary breast cancers (>50%) are due to mutations in BRCA1 or BRCA2.  Around 12-30% are due to mutations in other highly penetrant genes, such as PALB2, PTEN and TP53, or in moderately penetrant genes, such as JERALD, BARD1, and CHEK2.  The remaining percentage are caused by unknown/unidentified genes. Additionally, up to 20% of ovarian cancers are hereditary, with the majority being due to mutations in JERALD, BRCA1, BRCA2, BRIP1, LS genes [MLH1, MSH2, MSH6, EPCAM], PALB2, RAD51C, and RAD51D. Loren meets NCCN criteria for genetic testing based on her mother having ovarian cancer. Therefore, she was offered phenotype-driven and broad panel testing. Loren opted for the  panel with RNA through 39 of the following  genes associated with hereditary breast, gastrointestinal, gynecologic, pancreatic, prostate, skin and other cancers:    APC, JERALD, AXIN2, BAP1, BARD1, BMPR1A, BRCA1, BRCA2, BRIP1, CDH1, CDK4, CDKN2A, CHEK2, EPCAM, FH, FLCN, GREM1, HOXB13, MBD4, MET, MLH1, MSH2, MSH3, MSH6, MUTYH, NF1, NTHL1, PALB2, PMS2, POLD1, POLE, PTEN, RAD51C, RAD51D, SMAD4, STK11, TP53, TSC1, TSC2, VHL     We reviewed that mutations in the highly penetrant genes put an individual at a significantly increased risk of breast and other cancers.  There are established screening and surgery guidelines for these syndromes. Mutations in the moderately penetrant  genes increase the risk of breast and other cancers, but less is understood regarding their role in cancer risk. There may not be standard screening or management guidelines for individuals who have mutations in these genes.    Furthermore, we discussed the psychosocial implications of a positive result, including anxiety, fear and guilt if a mutation is passed on to a child. Loren expressed wanting information for her daughters and now grandchild.     Most Informative Person to Test: Genetic testing usually provides the most information when completed for the family member who is most likely to test positive. This would be someone who had a personal history of suspicious cancer(s) (based on type, age, or number). If this individual tests negative, it is very unlikely that others in the family would have a hereditary cancer risk (unless others also have a suspicious personal history). If this family member tests positive, then testing would be recommended for other relatives.      If someone who does not have a personal history of suspicious cancer has genetic testing, a negative result is much more difficult to interpret (unless there is a known hereditary cancer predisposition in the family). There are several possibilities for a negative result in this situation:    The cancer in the family could be due to a hereditary cancer risk that this individual did not inherit. In this case, a negative result would likely reduce the risk of related cancers.  The cancer in the family may not be due to an identifiable hereditary cancer risk. The cancers in the family may be related to shared environmental or lifestyle factors or a genetic factor that cannot be identified by the test completed using current technology. As a result, the risk of cancer may still be increased based on the family history.     In Loren's family, the most appropriate individual to have genetic testing would be her mother. However, we  discussed that this may be difficult given her mother's dementia. We discussed that Loren could undergo genetic testing, with the limitation that a negative result will not provide as much information about her cancer risks.      Possible Results:    Positive (pathogenic or likely pathogenic variant): A genetic variant was found that is suspected or known to impact the function of the gene. The impact of a positive result on an individual's risk of cancer varies based on the gene, specific variant, individual's sex assigned at birth, personal cancer history, other health history (such as surgical history), and family history. A positive result can impact screening and risk management recommendations. However, there are not always available guidelines for management based on a specific gene variant. Family history and personal risk factors should always be considered. Sometimes, a positive result can also have treatment or reproductive implications.   Negative: No clinically significant variants were reported in the tested genes. A negative result does not indicate that an individual cannot develop cancer or even that the individual is at average risk. An individual may still be at an increased risk for cancer based on personal risk factors or family history. Additionally, there could be a hereditary cancer predisposition that was not included in a chosen panel or is not detected with current technology.   Variant of Uncertain Significance (VUS): A variant was found. However, the lab does not have enough information to determine if the variant is benign (harmless) or pathogenic (impacts the function of the gene). The laboratory may update (reclassify) the variant over time as more information becomes available. When reclassified, most variants of uncertain significance are reclassified to benign/likely benign. Typically, it is not recommended to  based on the presence of a VUS. The chance of  finding a VUS varies based on the test performed. Generally, the chance of finding a VUS increases with the number of genes tested and decreases with the amount of testing of that gene (genes that are tested more frequently or for a longer period of time have a lower VUS rate).    Genetic Mutation Inheritance:  When an individual has a gene mutation, their first-degree relatives (parents, children, and siblings) each have a 50% chance of carrying the same mutation. Other, more distant blood relatives can also be at risk of carrying the same mutation. At-risk relatives of an individual with a mutation should consider genetic testing to help determine their risk for cancer.     Genetic Discrimination: The Genetic Information Nondiscrimination Act of 2008 (VILMA) is a [place] federal law that provides some protections against the use of an individual's genetic information by their health insurer and by their employer. Title I of VILMA prohibits most health insurers (except for insurance obtained through a job with the  or the Federal Employees Health Benefits Plan) from utilizing an individual's genetic information to make decisions regarding insurance eligibility or premium charges. Title II of VILMA prohibits covered entities from requesting or requiring the genetic information of employees and applicants and from using said information to make employment decisions. This does not apply to employers with fewer than 15 employees or to the .  VILMA also does not protect individuals from genetic discrimination by any other type of policy or entity, including but not limited to life insurance, disability insurance, long-term care insurance,  benefits, and [facility] benefits.    There is also a possibility for the patient to incur out-of-pocket costs related to this testing. Loren appeared to have a good understanding of the information as she asked appropriate questions.  Loren  "received comprehensive counseling regarding panel testing and has elected to proceed with this testing. A sample was scheduled to be submitted on 3/11/2025 to [facility].  Loren's results should be available in approximately 3 weeks.  In the meantime, she is welcome to contact me if she has any questions, concerns, or updates to her family history.       ASSESSMENT / PLAN      Loren Lemus ("Loren"), 49 y.o., presented today for hereditary cancer risk assessment and genetic counseling given her family history of breast and ovarian cancer:  Her mother's personal history of cancer is suspicious given the combination of breast and ovarian cancer in a single person  Her mother has dementia, testing her may be difficult  Loren understands a negative test for her does not rule out a predisposition in her mother  Her paternal family history is not suspicious    She meets NCCN HBOPP criteria based on her mother having ovarian cancer  She elected to proceed with testing through the [facility] xG panel with RNA  Blood sample collected today  Financial form filled out in clinic  I will call her in ~3 weeks when her results are ready    Breast:    Estimated Residual Breast Cancer Risk Model Used Patient's Score Patient's Risk Category   5-year Hina Model 2.77%  []N/A given age <35   [] Average risk (<1.7%)   [x] Intermediate risk (1.7-3%)   [] Increased risk (>=3%)   10-year Tyrer-Cuzick v8.0b 5.68%  []<5%   [x]>=5%    Lifetime (to age 85) Tyrer-Cuzick v8.0b 18.72%  []Average risk (<15%)   [x] Intermediate risk (15% - <20%)   [] Increased risk (>=20%)   Your risk can change over time if there are any changes to your family history or personal risk factors, as well as just as you age. Your risk should be re-evaluated on a regular basis. Please note, this risk was generated using information you provided. Any missing, unknown, or inaccurate information may impact this risk.       ICD-10-CM ICD-9-CM   1. Family history of " breast cancer  Z80.3 V16.3   2. Family history of ovarian cancer  Z80.41 V16.41   3. Family history of colon cancer  Z80.0 V16.0   4. Family history of lung cancer  Z80.1 V16.1     1. Family history of breast cancer  - Ambulatory referral/consult to Genetics  - [facility] - Hereditary Cancer with RNA; Future    2. Family history of ovarian cancer  - [facility] - Hereditary Cancer with RNA; Future    3. Family history of colon cancer  - [facility] - Hereditary Cancer with RNA; Future    4. Family history of lung cancer  - [facility] - Hereditary Cancer with RNA; Future       Genetic Test Information  Testing lab: [facility]   Test panel: xG with RNA     ICD-10 code(s): Z80.3, Z80.41, Z80.0, Z80.1   Verbal informed consent: Obtained   Written informed consent: Obtained   Specimen type: Blood  (Patient denies blood disorders that would necessitate a skin fibroblast specimen)   Specimen collection by: [facility]   Specimen collection date: 03/11/2025   Results expected by: Approximately 2-3 weeks after the genetic testing lab receives the specimen   Results disclosure plan: Post-test visit if positive or complex result; otherwise, results will be communicated through phone call       Follow-up:  No follow-ups on file.    Questions were encouraged and answered to the patient's satisfaction, and she verbalized understanding of the information and agreement with the plan.         Approximately 33 minutes were spent face-to-face with the patient.  Approximately 55 minutes in total were spent on this encounter, which includes face-to-face time and non-face-to-face time preparing to see the patient (e.g., review of tests), obtaining and/or reviewing separately obtained history, documenting clinical information in the electronic or other health record, independently interpreting results (not separately reported) and communicating results to the patient/family/caregiver, or care coordination (not separately reported).     This assessment  is based on the history and reports provided, as well as the current scientific knowledge regarding cancer genetics.         Sana Bailey MS, [facility]  Genetic Counselor, Hereditary and High-Risk Clinic  Department of Hematology and Oncology  [facility]        CC:  Dr. Michael A. Wiedemann

## 2025-03-06 ENCOUNTER — PATIENT MESSAGE (OUTPATIENT)
Dept: HEMATOLOGY/ONCOLOGY | Facility: CLINIC | Age: 50
End: 2025-03-06
Payer: COMMERCIAL

## 2025-03-11 ENCOUNTER — PATIENT MESSAGE (OUTPATIENT)
Dept: OBSTETRICS AND GYNECOLOGY | Facility: CLINIC | Age: 50
End: 2025-03-11
Payer: COMMERCIAL

## 2025-03-11 ENCOUNTER — OFFICE VISIT (OUTPATIENT)
Dept: HEMATOLOGY/ONCOLOGY | Facility: CLINIC | Age: 50
End: 2025-03-11
Payer: COMMERCIAL

## 2025-03-11 ENCOUNTER — LAB VISIT (OUTPATIENT)
Dept: LAB | Facility: HOSPITAL | Age: 50
End: 2025-03-11
Attending: INTERNAL MEDICINE
Payer: COMMERCIAL

## 2025-03-11 DIAGNOSIS — N83.209 CYST OF OVARY, UNSPECIFIED LATERALITY: ICD-10-CM

## 2025-03-11 DIAGNOSIS — Z80.0 FAMILY HISTORY OF COLON CANCER: ICD-10-CM

## 2025-03-11 DIAGNOSIS — Z80.1 FAMILY HISTORY OF LUNG CANCER: ICD-10-CM

## 2025-03-11 DIAGNOSIS — Z80.3 FAMILY HISTORY OF BREAST CANCER: Primary | ICD-10-CM

## 2025-03-11 DIAGNOSIS — Z80.41 FAMILY HISTORY OF OVARIAN CANCER: ICD-10-CM

## 2025-03-11 DIAGNOSIS — Z80.3 FAMILY HISTORY OF BREAST CANCER: ICD-10-CM

## 2025-03-11 PROCEDURE — 36415 COLL VENOUS BLD VENIPUNCTURE: CPT | Mod: PN

## 2025-03-11 PROCEDURE — 86304 IMMUNOASSAY TUMOR CA 125: CPT | Performed by: OBSTETRICS & GYNECOLOGY

## 2025-03-11 PROCEDURE — 99999 PR PBB SHADOW E&M-EST. PATIENT-LVL II: CPT | Mod: PBBFAC,,, | Performed by: BEHAVIOR TECHNICIAN

## 2025-03-11 PROCEDURE — 99499 UNLISTED E&M SERVICE: CPT | Mod: S$GLB,,, | Performed by: BEHAVIOR TECHNICIAN

## 2025-03-11 PROCEDURE — 96041 GENETIC COUNSELING SVC EA 30: CPT | Mod: S$GLB,,, | Performed by: BEHAVIOR TECHNICIAN

## 2025-03-12 LAB — CANCER AG125 SERPL-ACNC: 36 U/ML (ref 0–30)

## 2025-03-17 ENCOUNTER — OFFICE VISIT (OUTPATIENT)
Dept: CARDIOLOGY | Facility: CLINIC | Age: 50
End: 2025-03-17
Attending: INTERNAL MEDICINE
Payer: COMMERCIAL

## 2025-03-17 VITALS
DIASTOLIC BLOOD PRESSURE: 81 MMHG | HEART RATE: 89 BPM | WEIGHT: 196 LBS | HEIGHT: 69 IN | SYSTOLIC BLOOD PRESSURE: 125 MMHG | BODY MASS INDEX: 29.03 KG/M2

## 2025-03-17 DIAGNOSIS — R00.2 PALPITATIONS: Primary | ICD-10-CM

## 2025-03-17 DIAGNOSIS — I47.10 SVT (SUPRAVENTRICULAR TACHYCARDIA): ICD-10-CM

## 2025-03-17 DIAGNOSIS — I31.39 PERICARDIAL EFFUSION: ICD-10-CM

## 2025-03-17 DIAGNOSIS — F41.1 ANXIETY, GENERALIZED: ICD-10-CM

## 2025-03-17 DIAGNOSIS — R00.0 TACHYCARDIA, UNSPECIFIED: ICD-10-CM

## 2025-03-17 PROCEDURE — 3008F BODY MASS INDEX DOCD: CPT | Mod: CPTII,S$GLB,,

## 2025-03-17 PROCEDURE — 3079F DIAST BP 80-89 MM HG: CPT | Mod: CPTII,S$GLB,,

## 2025-03-17 PROCEDURE — 3074F SYST BP LT 130 MM HG: CPT | Mod: CPTII,S$GLB,,

## 2025-03-17 PROCEDURE — 99999 PR PBB SHADOW E&M-EST. PATIENT-LVL V: CPT | Mod: PBBFAC,,,

## 2025-03-17 PROCEDURE — 1159F MED LIST DOCD IN RCRD: CPT | Mod: CPTII,S$GLB,,

## 2025-03-17 PROCEDURE — 99214 OFFICE O/P EST MOD 30 MIN: CPT | Mod: S$GLB,,,

## 2025-03-17 NOTE — PROGRESS NOTES
Subjective:    Patient ID:  Loren Lemus is a 49 y.o. female patient here for evaluation Follow-up    History of Present Illness:     Patient of Dr. De La Rosa's here today to review her monitor results.   She is not able to sleep when her heart rate is low. Lowest 44 on monitor.   She felt each time she went into SVT while she was wearing the monitor. She said she felt two hours of SVT one day and watched it on the monitor, but the Holter only caught 19 seconds as the longest one.   Multiple autoimmune illness. Undergoing genetic workup for family history of cancer.       Most Recent Echocardiogram Results  Results for orders placed during the hospital encounter of 02/18/25    Stress Echo Which stress agent will be used? Pharmacological; Color Flow Doppler? No    Interpretation Summary    Stress Protocol: The patient was infused intravenously with dobutamine. The patient received a graduated infusion of the stress agent beginning at 10.0 mcg/kg/min to a peak dose of 20.0 mcg/kg/min. The peak heart rate was 142 bpm, which is 88% of age predicted maximum heart rate. The test was stopped because the target heart rate was achieved    Left Ventricle: The left ventricle is normal in size. There is normal systolic function with a visually estimated ejection fraction of 55 - 60%. There is normal diastolic function.    Right Ventricle: Normal right ventricular cavity size. Systolic function is normal.    Pulmonary Artery: The estimated pulmonary artery systolic pressure is 21 mmHg.    IVC/SVC: Normal venous pressure at 3 mmHg.    Pericardium: There is a trivial to small circumferential effusion. No indication of cardiac tamponade.    Baseline ECG: The Baseline ECG reveals sinus rhythm.    Stress ECG: There is no ST segment deviation identified during the protocol. There are no arrhythmias during stress. There is normal blood pressure response with stress.    ECG Conclusion: The ECG portion of the study is negative for  ischemia.    Post-stress Conclusion: The study is negative with no echocardiographic evidence of stress induced ischemia.      Most Recent Nuclear Stress Test Results  No results found for this or any previous visit.      Most Recent Cardiac PET Stress Test Results  No results found for this or any previous visit.      Most Recent Cardiovascular Angiogram results  No results found for this or any previous visit.      Other Most Recent Cardiology Results  Results for orders placed during the hospital encounter of 02/06/25    Cardiac Monitor - 3-15 Day Adult (Cupid Only)    Interpretation Summary    The predominant rhythm is sinus.    No clinically significant arrhythmias    Patient had a min HR of 44 bpm, max HR of 155 bpm, and avg HR of 78 bpm. Predominant underlying rhythm was Sinus Rhythm. 3 Supraventricular Tachycardia runs occurred, the run with the fastest interval lasting 4 beats with a max rate of 135 bpm, the longest lasting 19.2 secs with an avg rate of 96 bpm. Isolated SVEs were rare (<1.0%), SVE Couplets were rare (<1.0%), and SVE Triplets were rare (<1.0%). Isolated VEs were rare (<1.0%), and no VE Couplets or VE Triplets were present.      REVIEW OF SYSTEMS: As noted in HPI   CARDIOVASCULAR: No recent  arm/neck/jaw pain, or edema.  RESPIRATORY: No recent fever, cough  : No blood in the urine  GI: No reflux, nausea, vomiting, or blood in stool.   MUSCULOSKELETAL: No falls.   NEURO: No headaches, syncope, or dizziness.  EYES: No sudden changes in vision.     Past Medical History:   Diagnosis Date    Allergy     Asthma     CVID (common variable immunodeficiency)     Endometriosis     Kidney stone     x 1    MCTD (mixed connective tissue disease)     Migraine     Miscarriage     MVP (mitral valve prolapse)     Rheumatoid arthritis     Systemic lupus erythematosus     Thyroid disease      Past Surgical History:   Procedure Laterality Date    BREAST SURGERY Bilateral     augmentation with implants     HYSTERECTOMY      KNEE SURGERY Left     PELVIC LAPAROSCOPY      SEPTORHINOPLASTY      SINUS SURGERY      TONSILLECTOMY      TYMPANOSTOMY TUBE PLACEMENT       Social History[1]      Objective    There were no vitals filed for this visit.    LAST EKG  Results for orders placed or performed during the hospital encounter of 18   EKG 12-lead    Collection Time: 18 10:27 AM    Narrative                             [facility]                                                                                  Test Date:    2018  Pat Name:     KIRILL CARIAS         Department:     Patient ID:   [id number]                 Room:           Gender:       Female                   Technician:   FAHAD  :          1975               Requested By:   Order Number:                          Reading MD:   Khanh Brannon                                   Measurements  Intervals                              Axis            Rate:         67                       P:            68  VT:           148                      QRS:          1  QRSD:         80                       T:            40  QT:           408                                      QTc:          431                                                                 Interpretive Statements  Normal sinus rhythm with sinus arrhythmia  Normal ECG  No previous ECG available for comparison    Electronically Signed On 3- 12:20:01 CDT by Khanh Brannon       LIPIDS - LAST 2   Lab Results   Component Value Date    CHOL 197 2025    CHOL 200 (H) 06/10/2022    HDL 71 2025    HDL 74 06/10/2022    LDLCALC 97.0 2025    LDLCALC 111.0 06/10/2022    TRIG 145 2025    TRIG 75 06/10/2022    CHOLHDL 36.0 2025    CHOLHDL 37.0 06/10/2022     CARDIAC PROFILE - LAST 2  Lab Results   Component Value Date    BNP <10 2025    CPK 43 2021     2021    TROPONINI <0.012 03/10/2018    TROPONINI <0.012 2018  "     CBC - LAST 2  Lab Results   Component Value Date    WBC 9.86 01/24/2025    WBC 8.16 02/07/2024    HGB 14.8 01/24/2025    HGB 15.7 02/07/2024    HCT 45.1 01/24/2025    HCT 46.9 02/07/2024     01/24/2025     02/07/2024     Lab Results   Component Value Date    APTT 23.0 (L) 01/18/2017     CHEMISTRY - LAST 2  Lab Results   Component Value Date     01/24/2025     02/07/2024    K 4.2 01/24/2025    K 3.5 02/07/2024    CO2 23 01/24/2025    CO2 22 (L) 02/07/2024    BUN 8 01/24/2025    BUN 6 02/07/2024    CREATININE 0.9 01/24/2025    CREATININE 0.8 02/07/2024     01/24/2025    GLU 94 02/07/2024    CALCIUM 9.3 01/24/2025    CALCIUM 9.3 02/07/2024    MG 2.1 04/06/2021    ALBUMIN 4.1 01/24/2025    ALBUMIN 4.1 02/07/2024    ALT 26 01/24/2025    ALT 24 02/07/2024    AST 20 01/24/2025    AST 17 02/07/2024      ENDOCRINE - LAST 2  Lab Results   Component Value Date    HGBA1C 4.9 06/10/2022    HGBA1C 5.3 03/26/2021    TSH 0.986 01/24/2025    TSH 0.725 02/07/2024        PHYSICAL EXAM  CONSTITUTIONAL: Well built, well nourished in no apparent distress  NECK: no carotid bruit, no JVD  LUNGS: CTA  CHEST WALL: no tenderness  HEART: regular rate and rhythm, S1, S2 normal, no murmur, click, rub or gallop   ABDOMEN: soft, non-tender; bowel sounds normal; no masses,  no organomegaly  EXTREMITIES: Extremities normal, no edema, no calf tenderness noted  NEURO: AAO X 3    I HAVE REVIEWED :    The vital signs, most recent cardiac testing, and most recent pertinent non-cardiology provider notes.    Current Outpatient Medications   Medication Instructions    AIMOVIG AUTOINJECTOR 140 mg, Subcutaneous, Every 28 days    aspirin 81 mg, Daily    azelastine (ASTELIN) 137 mcg (0.1 %) nasal spray Use one spray in each nostril 2 (two) times daily.    blunt needle, disposable 18 x 1 1/2 " Ndle Use to inject once a week    carBAMazepine (TEGRETOL XR) 100 mg, Oral, 2 times daily    cetirizine (ZYRTEC) 10 mg, 2 times daily " "   DULoxetine (CYMBALTA) 30 mg, Oral, 2 times daily    fluconazole (DIFLUCAN) 100 mg, Oral, Daily    fluticasone propionate (FLONASE) 50 mcg/actuation nasal spray Use one spray in each nostril twice daily    gentamicin (GARAMYCIN) 0.3 % ophthalmic solution 1 drop, Both Eyes, 3 times daily    leucovorin (WELLCOVORIN) 5 mg, Oral, Daily    naproxen sodium (ANAPROX) 550 mg, Oral, 2 times daily    ondansetron (ZOFRAN) 8 mg, As needed (PRN)    ondansetron (ZOFRAN) 4 mg, Oral, Every 6 hours PRN    propranoloL (INDERAL) 10 mg, Oral, 2 times daily    safety needles 25 gauge x 1 1/2" Ndle Use to inject every 7 days    TALTZ AUTOINJECTOR 80 mg, Subcutaneous, Every 28 days    thyroid, pork, (NP THYROID) 15 mg Tab Take 1 tablet (15 mg total) by mouth daily before breakfast with water    topiramate (TOPAMAX) 50 mg, Oral, 2 times daily    ubrogepant (UBRELVY) 100 mg tablet Start 1 tablet (ubrelvy 100 mg) by mouth for headache attacks.   May repeat once in 1 hours to a max of 2 per day.    ZEPBOUND 5 mg, Subcutaneous, Every 7 days    ZOLMitriptan (ZOMIG) 5 mg Spry Use 1 spray in one nostril as needed for migraine. May repeat once in 2 hours. No more than 2 days/week.        Assessment & Plan   1. Palpitations (Primary)  2. Tachycardia, unspecified  Start inderal 10 mg once in the AM to assess response     3. Anxiety, generalized  AS per PCP      4. Pericardial effusion   Trivial 2024   Repeat echo in few months to assess for resolution/monitoring   Likely from multiple autoimmune illnesses     She request to see an EP.     Carol Castro PA-C   [facility]   Office: [phone]         [1]   Social History  Tobacco Use    Smoking status: Never    Smokeless tobacco: Never   Substance Use Topics    Alcohol use: Not Currently    Drug use: No     "

## 2025-03-21 ENCOUNTER — OFFICE VISIT (OUTPATIENT)
Dept: CARDIOLOGY | Facility: CLINIC | Age: 50
End: 2025-03-21
Payer: COMMERCIAL

## 2025-03-21 VITALS
DIASTOLIC BLOOD PRESSURE: 85 MMHG | BODY MASS INDEX: 29.29 KG/M2 | WEIGHT: 197.75 LBS | HEART RATE: 79 BPM | SYSTOLIC BLOOD PRESSURE: 129 MMHG | HEIGHT: 69 IN

## 2025-03-21 DIAGNOSIS — R00.2 PALPITATIONS: Primary | ICD-10-CM

## 2025-03-21 DIAGNOSIS — I47.10 SVT (SUPRAVENTRICULAR TACHYCARDIA): ICD-10-CM

## 2025-03-21 DIAGNOSIS — R00.0 TACHYCARDIA, UNSPECIFIED: ICD-10-CM

## 2025-03-21 PROCEDURE — 99999 PR PBB SHADOW E&M-EST. PATIENT-LVL V: CPT | Mod: PBBFAC,,, | Performed by: INTERNAL MEDICINE

## 2025-03-21 NOTE — PROGRESS NOTES
SUBJECTIVE:    Patient ID:  Loren Lemus is a 49 y.o. female who was referred to electrophysiology clinic as a new patient for evaluation of palpitations.      Medical history:  Palpitations   Tachycardia  Migraine  Psoriatic arthritis    Cardiologist: Dr. De La Rosa  Referred by PETER Mcallister    Patient was referred for palpitation tachycardia evaluation.  She recently underwent a 14 day Zio monitor which was overall unremarkable and only showed short episodes of nonsustained VT for which the patient did not report any symptoms.  Average heart rate 70 beats per minute (range 44 to 155 beats per minute).  She reports an episode of tachycardia with a heart rate up to 180s with minimal exertion.  She reports perform vagal maneuvers with improvement of her heart rate into the low 100s.  She was started on Inderal by Cardiology, but denies any changes symptoms.    72 hour Holter 8/24 with no significant arrhythmias.    All ECGs in EMR personally reviewed which show normal sinus rhythm    Relevant labs: cr 0.9, TSH 1  Relevant EP meds:  Inderal 10 mg b.i.d.    I personally reviewed the ECG/telemetry strip today. My interpretation is normal sinus rhythm without ectopy.  Normal intervals.    Past Medical History:   Diagnosis Date    Allergy     Asthma     CVID (common variable immunodeficiency)     Endometriosis     Kidney stone     x 1    MCTD (mixed connective tissue disease)     Migraine     Miscarriage     MVP (mitral valve prolapse)     Rheumatoid arthritis     Systemic lupus erythematosus     Thyroid disease        Past Surgical History:   Procedure Laterality Date    BREAST SURGERY Bilateral     augmentation with implants    HYSTERECTOMY      KNEE SURGERY Left     PELVIC LAPAROSCOPY      SEPTORHINOPLASTY  1988    SINUS SURGERY      TONSILLECTOMY      TYMPANOSTOMY TUBE PLACEMENT         Family History   Problem Relation Name Age of Onset    Colon cancer Paternal Grandfather  85    Immunodeficiency Maternal  Grandmother Olena     Nephrolithiasis Father          severe    Hypertension Father      Ovarian cancer Mother Diane 50 - 59    Breast cancer Mother Diane 70 - 79        lumpectomy    Clotting disorder Mother Diane     Hypertension Mother Diane     Immunodeficiency Daughter Ana     Immunodeficiency Daughter Gwen     Lung cancer Maternal Grandfather Ronny         adenocarcinoma    Lung cancer Paternal Grandmother      Dementia Maternal Uncle Rickie yañez body dementia    Lung cancer Other Ronny     Breast cancer Other Capri 70 - 79        maybe 80s    Allergic rhinitis Neg Hx      Allergies Neg Hx      Angioedema Neg Hx      Asthma Neg Hx      Atopy Neg Hx      Eczema Neg Hx      Rhinitis Neg Hx      Urticaria Neg Hx         Social History     Socioeconomic History    Marital status: Significant Other   Occupational History    Occupation: emergency room nurse with [employer]     Comment: [employer]   Tobacco Use    Smoking status: Never    Smokeless tobacco: Never   Substance and Sexual Activity    Alcohol use: Not Currently    Drug use: No    Sexual activity: Yes     Birth control/protection: See Surgical Hx     Social Drivers of Health     Financial Resource Strain: Low Risk  (3/13/2025)    Overall Financial Resource Strain (CARDIA)     Difficulty of Paying Living Expenses: Not hard at all   Food Insecurity: No Food Insecurity (3/13/2025)    Hunger Vital Sign     Worried About Running Out of Food in the Last Year: Never true     Ran Out of Food in the Last Year: Never true   Transportation Needs: No Transportation Needs (3/13/2025)    PRAPARE - Transportation     Lack of Transportation (Medical): No     Lack of Transportation (Non-Medical): No   Physical Activity: Insufficiently Active (3/13/2025)    Exercise Vital Sign     Days of Exercise per Week: 3 days     Minutes of Exercise per Session: 10 min   Stress: No Stress Concern Present (3/13/2025)    [place] of  "Occupational Health - Occupational Stress Questionnaire     Feeling of Stress : Not at all   Housing Stability: Low Risk  (3/13/2025)    Housing Stability Vital Sign     Unable to Pay for Housing in the Last Year: No     Number of Times Moved in the Last Year: 1     Homeless in the Last Year: No       Review of patient's allergies indicates:   Allergen Reactions    Codeine Hives    Covid-19 vacc,mrna(pfizer)(pf) Anaphylaxis     To influenza    Flu vaccine [id number](6mos up) Anaphylaxis    Latex, natural rubber Swelling    Oxycodone Hives    Methotrexate analogues      Liver toxicity       Review of Systems   All other systems reviewed and are negative.           OBJECTIVE:     Vitals:    03/21/25 1140   BP: 129/85   Pulse: 79   Weight: 89.7 kg (197 lb 12 oz)   Height: 5' 9" (1.753 m)       BP Readings from Last 5 Encounters:   03/21/25 129/85   03/17/25 125/81   02/28/25 123/85   02/05/25 134/84   12/06/24 126/79        Physical Exam  Vitals and nursing note reviewed.   Constitutional:       General: She is not in acute distress.     Appearance: She is well-developed. She is not diaphoretic.   HENT:      Head: Normocephalic and atraumatic.   Eyes:      General: No scleral icterus.        Right eye: No discharge.         Left eye: No discharge.   Cardiovascular:      Rate and Rhythm: Normal rate and regular rhythm. No extrasystoles are present.     Pulses: Normal pulses and intact distal pulses.           Radial pulses are 2+ on the right side and 2+ on the left side.      Heart sounds: Normal heart sounds, S1 normal and S2 normal. Heart sounds not distant. No opening snap. No murmur heard.     No friction rub. No gallop. No S3 or S4 sounds.   Pulmonary:      Effort: Pulmonary effort is normal. No respiratory distress.      Breath sounds: No wheezing or rales.   Abdominal:      General: Bowel sounds are normal. There is no distension.      Palpations: Abdomen is soft.      Tenderness: There is no abdominal " "tenderness.   Musculoskeletal:         General: No deformity. Normal range of motion.      Cervical back: Normal range of motion and neck supple.   Skin:     General: Skin is warm and dry.      Findings: No erythema or rash.   Neurological:      Mental Status: She is alert.             Current Outpatient Medications   Medication Instructions    AIMOVIG AUTOINJECTOR 140 mg, Subcutaneous, Every 28 days    aspirin 81 mg, Daily    azelastine (ASTELIN) 137 mcg (0.1 %) nasal spray Use one spray in each nostril 2 (two) times daily.    blunt needle, disposable 18 x 1 1/2 " Ndle Use to inject once a week    carBAMazepine (TEGRETOL XR) 100 mg, Oral, 2 times daily    cetirizine (ZYRTEC) 10 mg, 2 times daily    DULoxetine (CYMBALTA) 30 mg, Oral, 2 times daily    fluconazole (DIFLUCAN) 100 mg, Oral, Daily    fluticasone propionate (FLONASE) 50 mcg/actuation nasal spray Use one spray in each nostril twice daily    gentamicin (GARAMYCIN) 0.3 % ophthalmic solution 1 drop, Both Eyes, 3 times daily    leucovorin (WELLCOVORIN) 5 mg, Oral, Daily    naproxen sodium (ANAPROX) 550 mg, Oral, 2 times daily    ondansetron (ZOFRAN) 8 mg, As needed (PRN)    ondansetron (ZOFRAN) 4 mg, Oral, Every 6 hours PRN    propranoloL (INDERAL) 10 mg, Oral, 2 times daily    safety needles 25 gauge x 1 1/2" Ndle Use to inject every 7 days    TALTZ AUTOINJECTOR 80 mg, Subcutaneous, Every 28 days    thyroid, pork, (NP THYROID) 15 mg Tab Take 1 tablet (15 mg total) by mouth daily before breakfast with water    topiramate (TOPAMAX) 50 mg, Oral, 2 times daily    ubrogepant (UBRELVY) 100 mg tablet Start 1 tablet (ubrelvy 100 mg) by mouth for headache attacks.   May repeat once in 1 hours to a max of 2 per day.    ZEPBOUND 5 mg, Subcutaneous, Every 7 days    ZOLMitriptan (ZOMIG) 5 mg Spry Use 1 spray in one nostril as needed for migraine. May repeat once in 2 hours. No more than 2 days/week.       Lipid Panel:   Lab Results   Component Value Date    CHOL 197 " 2025    HDL 71 2025    LDLCALC 97.0 2025    TRIG 145 2025    CHOLHDL 36.0 2025         The 10-year ASCVD risk score (Kiley LOCKWOOD, et al., 2019) is: 0.9%    Values used to calculate the score:      Age: 49 years      Sex: Female      Is Non- : No      Diabetic: No      Tobacco smoker: No      Systolic Blood Pressure: 129 mmHg      Is BP treated: No      HDL Cholesterol: 71 mg/dL      Total Cholesterol: 197 mg/dL    Most Recent EKG Results  Results for orders placed or performed during the hospital encounter of 18   EKG 12-lead    Collection Time: 18 10:27 AM    [facility]                                                                                  Test Date:    2018  Pat Name:     KIRILL CARIAS         Department:     Patient ID:   [id number]                 Room:           Gender:       Female                   Technician:   FAHAD  :          1975               Requested By:   Order Number:                          Reading MD:   Khanh Brannon                                   Measurements  Intervals                              Axis            Rate:         67                       P:            68  AL:           148                      QRS:          1  QRSD:         80                       T:            40  QT:           408                                      QTc:          431                                                                 Interpretive Statements  Normal sinus rhythm with sinus arrhythmia  Normal ECG  No previous ECG available for comparison    Electronically Signed On 3- 12:20:01 CDT by Khanh Brannon         Most Recent Echocardiogram Results  Results for orders placed during the hospital encounter of 25    Stress Echo Which stress agent will be used? Pharmacological; Color Flow Doppler? No    Interpretation Summary    Stress Protocol: The patient  was infused intravenously with dobutamine. The patient received a graduated infusion of the stress agent beginning at 10.0 mcg/kg/min to a peak dose of 20.0 mcg/kg/min. The peak heart rate was 142 bpm, which is 88% of age predicted maximum heart rate. The test was stopped because the target heart rate was achieved    Left Ventricle: The left ventricle is normal in size. There is normal systolic function with a visually estimated ejection fraction of 55 - 60%. There is normal diastolic function.    Right Ventricle: Normal right ventricular cavity size. Systolic function is normal.    Pulmonary Artery: The estimated pulmonary artery systolic pressure is 21 mmHg.    IVC/SVC: Normal venous pressure at 3 mmHg.    Pericardium: There is a trivial to small circumferential effusion. No indication of cardiac tamponade.    Baseline ECG: The Baseline ECG reveals sinus rhythm.    Stress ECG: There is no ST segment deviation identified during the protocol. There are no arrhythmias during stress. There is normal blood pressure response with stress.    ECG Conclusion: The ECG portion of the study is negative for ischemia.    Post-stress Conclusion: The study is negative with no echocardiographic evidence of stress induced ischemia.      Most Recent Nuclear Stress Test Results  No results found for this or any previous visit.      Most Recent Cardiac PET Stress Test Results  No results found for this or any previous visit.      Most Recent Cardiovascular Angiogram results  No results found for this or any previous visit.      Other Most Recent Cardiology Results  Results for orders placed during the hospital encounter of 02/06/25    Cardiac Monitor - 3-15 Day Adult (Cupid Only)    Interpretation Summary    The predominant rhythm is sinus.    No clinically significant arrhythmias    Patient had a min HR of 44 bpm, max HR of 155 bpm, and avg HR of 78 bpm. Predominant underlying rhythm was Sinus Rhythm. 3 Supraventricular Tachycardia  runs occurred, the run with the fastest interval lasting 4 beats with a max rate of 135 bpm, the longest lasting 19.2 secs with an avg rate of 96 bpm. Isolated SVEs were rare (<1.0%), SVE Couplets were rare (<1.0%), and SVE Triplets were rare (<1.0%). Isolated VEs were rare (<1.0%), and no VE Couplets or VE Triplets were present.        All pertinent data including labs, imaging, EKGs, and studies listed above were reviewed.  All EKG tracing in [facility] were personally interpreted by this provider.    ASSESSMENT:   Loren Lemus is a 49 y.o. female who presents for evaluation of palpitations and tachycardia.  No clearly defined SVT at this time.    1. Palpitations        2. Tachycardia, unspecified  Ambulatory referral/consult to Cardiac Electrophysiology    IN OFFICE EKG 12-LEAD (to Muse)      3. SVT (supraventricular tachycardia)  Ambulatory referral/consult to Cardiac Electrophysiology    IN OFFICE EKG 12-LEAD (to Muse)        PLAN FOR TREATMENT OF ABOVE DIAGNOSES:     Hold Inderal at this time given no improvement in symptoms.  Discussed taking p.r.n..  Also discussed resumption of Inderal if she feels worse after discontinuation.  The patient to purchase [employer]  We discussed the importance of aerobic exercise to assist with heart rate variations      F/u in 6 months    Austin Martinez MD  Cardiac Electrophysiology    This note was partially generated using voice recognition and generative artificial intelligence software. While every effort has been made to ensure its accuracy, transcription errors may exist. Please reach out to me with any questions or if clarification is needed.

## 2025-03-25 ENCOUNTER — TELEPHONE (OUTPATIENT)
Dept: HEMATOLOGY/ONCOLOGY | Facility: CLINIC | Age: 50
End: 2025-03-25
Payer: COMMERCIAL

## 2025-03-25 DIAGNOSIS — Z80.3 FAMILY HISTORY OF BREAST CANCER: Primary | ICD-10-CM

## 2025-03-25 NOTE — TELEPHONE ENCOUNTER
Impression    Loren Lemus's panel genetic testing was negative for actionable mutations in 39 genes associated with hereditary breast, gastrointestinal, gynecologic, pancreatic, prostate and skin cancers. Results were disclosed over the phone on 3/26/2025.    Discussion    Genetic Test Results    Loren Lemus had a sample submitted on 3/11/2025 to [facility] for xG with RNA panel testing. This panel includes sequencing and/or deletion/duplication analysis of the following 39 genes:    APC, EJRALD, AXIN2, BAP1, BARD1, BMPR1A, BRCA1, BRCA2, BRIP1, CDH1, CDK4, CDKN2A, CHEK2, EPCAM, FH, FLCN, GREM1, HOXB13, MBD4, MET, MLH1, MSH2, MSH3, MSH6, MUTYH, NF1, NTHL1, PALB2, PMS2, POLD1, POLE, PTEN, RAD51C, RAD51D, SMAD4, STK11, TP53, TSC1, TSC2, VHL     The results were negative for actionable mutations in any of these genes. This result reduces the chance that you could have a hereditary predisposition to cancer due to any of the tested genes. However, without a known hereditary predisposition in the family, interpretation of this negative result is limited as you do not have a personal history of cancer. Possible explanations include:   The cancer in the family may not be due to a hereditary cancer predisposition. Most cancer is not related to a hereditary cancer predisposition. However, having a family history of cancer may still impact the risk of cancer because of other shared factors like environment or lifestyle. You and your relatives should discuss the family history and any personal risk factors with a healthcare provider to determine an appropriate plan for cancer screening and risk reduction.   There is a pathogenic variant contributing to the family history of cancer, but you did not inherit it. Your relatives should speak with their healthcare providers to determine if they may benefit from genetic testing.   You and/or your relatives could have a pathogenic variant in a gene that has not been associated with  "cancer or that was not tested.  You and/or your relatives could have a pathogenic variant that the current technology was not able to detect.    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 A VUS is a change in a gene that may or may not be related to cancer risk. However, there is not enough information available to determine if it is disease-causing/pathogenic ("positive") or benign ("negative").     The VUS was identified in the JERALD c.7375C>G (p.[id number])  gene, pathogenic mutations in which cause an increased risk for breast and pancreatic cancer. However, it is unknown if the variant found in Loren, labeled c.7375C>G (p.[id number]), is related to cancer risk. It has been classified as VUS by multiple other laboratories. Therefore, it is not recommended to alter management for Loren or her family at this time, based on this variant.    When this variant is reclassified, the laboratory will send our office an updated report.     Cancer Risks    Breast Cancer Risk Stratification   Current, Estimated Breast Cancer Risk Model Used Patient's Score Patient's Risk Category   5-year Hina Model 2.12%  [] N/A given age <35   [] Average risk (<1.7%)   [x] Increased risk (>=1.7%)   10-year Tyrer-Cuzick v8.0b 4.4%  [x] <5%   [] >=5%    Lifetime (to age 85) Tyrer-Cuzick v8.0b 15.39%  [] Average risk (<15%)   [x] Intermediate risk (>=15% - <20%)   [] Increased risk (>=20%)      There are differences between these models and how her personal and family history affects these risks. These risk numbers are subject to change overtime due to changes in a person's personal and family health histories.     [1.7% Hina] National Comprehensive Cancer Network Guidelines states that risk-reducing agents should be considered for and discussed with individuals with an estimated or a 5-year breast cancer risk of 1.7-3%.     I informed Loren that her lifetime risk is still intermediate, but her 5-year " risk is increased. I gave her the option of meeting with a breast specialist to discuss any risk reducing options/additional recommendations. Loren accepted this referral.       Loren Lemus received comprehensive genetic counseling regarding her negative genetic test results. Benefits and limitations were discussed, and she was provided with an electronic copy of her results report. She is encouraged to contact cancer genetics if there are any updates to her personal or family history, or if she has any questions or concerns.

## 2025-03-26 ENCOUNTER — CLINICAL SUPPORT (OUTPATIENT)
Dept: OTHER | Facility: CLINIC | Age: 50
End: 2025-03-26

## 2025-03-26 DIAGNOSIS — Z00.8 ENCOUNTER FOR OTHER GENERAL EXAMINATION: ICD-10-CM

## 2025-03-27 VITALS
SYSTOLIC BLOOD PRESSURE: 119 MMHG | WEIGHT: 191 LBS | DIASTOLIC BLOOD PRESSURE: 78 MMHG | HEIGHT: 68 IN | BODY MASS INDEX: 28.95 KG/M2

## 2025-03-27 LAB
HDLC SERPL-MCNC: 60 MG/DL
POC CHOLESTEROL, LDL (DOCK): 138 MG/DL
POC CHOLESTEROL, TOTAL: 220 MG/DL
POC GLUCOSE, FASTING: 78 MG/DL (ref 60–110)
TRIGL SERPL-MCNC: 122 MG/DL

## 2025-03-28 ENCOUNTER — TELEPHONE (OUTPATIENT)
Dept: OBSTETRICS AND GYNECOLOGY | Facility: CLINIC | Age: 50
End: 2025-03-28
Payer: COMMERCIAL

## 2025-04-17 ENCOUNTER — PATIENT MESSAGE (OUTPATIENT)
Dept: OBSTETRICS AND GYNECOLOGY | Facility: CLINIC | Age: 50
End: 2025-04-17
Payer: COMMERCIAL

## 2025-04-30 ENCOUNTER — TELEPHONE (OUTPATIENT)
Dept: OBSTETRICS AND GYNECOLOGY | Facility: CLINIC | Age: 50
End: 2025-04-30
Payer: COMMERCIAL

## 2025-04-30 DIAGNOSIS — R10.2 PELVIC PAIN IN FEMALE: Primary | ICD-10-CM

## 2025-05-02 ENCOUNTER — PATIENT MESSAGE (OUTPATIENT)
Dept: INTERNAL MEDICINE | Facility: CLINIC | Age: 50
End: 2025-05-02
Payer: COMMERCIAL

## 2025-05-02 RX ORDER — TIRZEPATIDE 7.5 MG/.5ML
7.5 INJECTION, SOLUTION SUBCUTANEOUS
Qty: 2 ML | Refills: 0 | Status: ACTIVE | OUTPATIENT
Start: 2025-05-02

## 2025-05-02 NOTE — TELEPHONE ENCOUNTER
Pt confirmed general tolerability with Zepbound 5 mg. To avoid unnecessary delays in titration, sent order to [facility] for Zepbound 7.5 mg. Will continue with scheduled follow-up.